# Patient Record
Sex: MALE | Race: WHITE | NOT HISPANIC OR LATINO | Employment: FULL TIME | URBAN - METROPOLITAN AREA
[De-identification: names, ages, dates, MRNs, and addresses within clinical notes are randomized per-mention and may not be internally consistent; named-entity substitution may affect disease eponyms.]

---

## 2023-12-08 ENCOUNTER — ANESTHESIA (OUTPATIENT)
Dept: SURGERY | Facility: MEDICAL CENTER | Age: 48
DRG: 329 | End: 2023-12-08
Payer: COMMERCIAL

## 2023-12-08 ENCOUNTER — APPOINTMENT (OUTPATIENT)
Dept: RADIOLOGY | Facility: MEDICAL CENTER | Age: 48
DRG: 329 | End: 2023-12-08
Attending: EMERGENCY MEDICINE
Payer: COMMERCIAL

## 2023-12-08 ENCOUNTER — HOSPITAL ENCOUNTER (INPATIENT)
Facility: MEDICAL CENTER | Age: 48
LOS: 7 days | DRG: 329 | End: 2023-12-15
Attending: EMERGENCY MEDICINE | Admitting: SURGERY
Payer: COMMERCIAL

## 2023-12-08 ENCOUNTER — ANESTHESIA EVENT (OUTPATIENT)
Dept: SURGERY | Facility: MEDICAL CENTER | Age: 48
DRG: 329 | End: 2023-12-08
Payer: COMMERCIAL

## 2023-12-08 DIAGNOSIS — K63.1 PERFORATION OF SIGMOID COLON (HCC): ICD-10-CM

## 2023-12-08 DIAGNOSIS — K63.1 PERFORATION BOWEL (HCC): ICD-10-CM

## 2023-12-08 DIAGNOSIS — Z71.89 OSTOMY NURSE CONSULTATION: ICD-10-CM

## 2023-12-08 PROBLEM — R19.8 PERFORATED VISCUS: Status: ACTIVE | Noted: 2023-12-08

## 2023-12-08 PROBLEM — E03.9 HYPOTHYROID: Status: ACTIVE | Noted: 2023-12-08

## 2023-12-08 PROBLEM — K66.8 PNEUMOPERITONEUM: Status: RESOLVED | Noted: 2023-12-08 | Resolved: 2023-12-08

## 2023-12-08 PROBLEM — K66.8 PNEUMOPERITONEUM: Status: ACTIVE | Noted: 2023-12-08

## 2023-12-08 LAB
ALBUMIN SERPL BCP-MCNC: 4.5 G/DL (ref 3.2–4.9)
ALBUMIN/GLOB SERPL: 1.5 G/DL
ALP SERPL-CCNC: 107 U/L (ref 30–99)
ALT SERPL-CCNC: 46 U/L (ref 2–50)
ANION GAP SERPL CALC-SCNC: 16 MMOL/L (ref 7–16)
APPEARANCE UR: CLEAR
AST SERPL-CCNC: 48 U/L (ref 12–45)
BACTERIA #/AREA URNS HPF: NEGATIVE /HPF
BASOPHILS # BLD AUTO: 0.2 % (ref 0–1.8)
BASOPHILS # BLD: 0.02 K/UL (ref 0–0.12)
BILIRUB SERPL-MCNC: 1.1 MG/DL (ref 0.1–1.5)
BILIRUB UR QL STRIP.AUTO: ABNORMAL
BUN SERPL-MCNC: 20 MG/DL (ref 8–22)
CALCIUM ALBUM COR SERPL-MCNC: 9.2 MG/DL (ref 8.5–10.5)
CALCIUM SERPL-MCNC: 9.6 MG/DL (ref 8.5–10.5)
CHLORIDE SERPL-SCNC: 101 MMOL/L (ref 96–112)
CO2 SERPL-SCNC: 25 MMOL/L (ref 20–33)
COLOR UR: ABNORMAL
CREAT SERPL-MCNC: 1.36 MG/DL (ref 0.5–1.4)
EOSINOPHIL # BLD AUTO: 0 K/UL (ref 0–0.51)
EOSINOPHIL NFR BLD: 0 % (ref 0–6.9)
EPI CELLS #/AREA URNS HPF: NEGATIVE /HPF
ERYTHROCYTE [DISTWIDTH] IN BLOOD BY AUTOMATED COUNT: 40.4 FL (ref 35.9–50)
GFR SERPLBLD CREATININE-BSD FMLA CKD-EPI: 65 ML/MIN/1.73 M 2
GLOBULIN SER CALC-MCNC: 3 G/DL (ref 1.9–3.5)
GLUCOSE SERPL-MCNC: 143 MG/DL (ref 65–99)
GLUCOSE UR STRIP.AUTO-MCNC: NEGATIVE MG/DL
HCT VFR BLD AUTO: 49.8 % (ref 42–52)
HGB BLD-MCNC: 17.6 G/DL (ref 14–18)
HYALINE CASTS #/AREA URNS LPF: ABNORMAL /LPF
IMM GRANULOCYTES # BLD AUTO: 0.02 K/UL (ref 0–0.11)
IMM GRANULOCYTES NFR BLD AUTO: 0.2 % (ref 0–0.9)
KETONES UR STRIP.AUTO-MCNC: 15 MG/DL
LEUKOCYTE ESTERASE UR QL STRIP.AUTO: ABNORMAL
LIPASE SERPL-CCNC: 19 U/L (ref 11–82)
LYMPHOCYTES # BLD AUTO: 0.49 K/UL (ref 1–4.8)
LYMPHOCYTES NFR BLD: 4 % (ref 22–41)
MCH RBC QN AUTO: 30.3 PG (ref 27–33)
MCHC RBC AUTO-ENTMCNC: 35.3 G/DL (ref 32.3–36.5)
MCV RBC AUTO: 85.9 FL (ref 81.4–97.8)
MICRO URNS: ABNORMAL
MONOCYTES # BLD AUTO: 0.68 K/UL (ref 0–0.85)
MONOCYTES NFR BLD AUTO: 5.5 % (ref 0–13.4)
NEUTROPHILS # BLD AUTO: 11.18 K/UL (ref 1.82–7.42)
NEUTROPHILS NFR BLD: 90.1 % (ref 44–72)
NITRITE UR QL STRIP.AUTO: NEGATIVE
NRBC # BLD AUTO: 0 K/UL
NRBC BLD-RTO: 0 /100 WBC (ref 0–0.2)
PH UR STRIP.AUTO: 5 [PH] (ref 5–8)
PLATELET # BLD AUTO: 317 K/UL (ref 164–446)
PMV BLD AUTO: 9.9 FL (ref 9–12.9)
POTASSIUM SERPL-SCNC: 3.8 MMOL/L (ref 3.6–5.5)
PROT SERPL-MCNC: 7.5 G/DL (ref 6–8.2)
PROT UR QL STRIP: NEGATIVE MG/DL
RBC # BLD AUTO: 5.8 M/UL (ref 4.7–6.1)
RBC # URNS HPF: ABNORMAL /HPF
RBC UR QL AUTO: NEGATIVE
SODIUM SERPL-SCNC: 142 MMOL/L (ref 135–145)
SP GR UR STRIP.AUTO: 1.03
UROBILINOGEN UR STRIP.AUTO-MCNC: 1 MG/DL
WBC # BLD AUTO: 12.4 K/UL (ref 4.8–10.8)
WBC #/AREA URNS HPF: ABNORMAL /HPF

## 2023-12-08 PROCEDURE — 110371 HCHG SHELL REV 272: Performed by: SURGERY

## 2023-12-08 PROCEDURE — 0DJD4ZZ INSPECTION OF LOWER INTESTINAL TRACT, PERCUTANEOUS ENDOSCOPIC APPROACH: ICD-10-PCS | Performed by: SURGERY

## 2023-12-08 PROCEDURE — 87077 CULTURE AEROBIC IDENTIFY: CPT

## 2023-12-08 PROCEDURE — 700111 HCHG RX REV CODE 636 W/ 250 OVERRIDE (IP): Performed by: ANESTHESIOLOGY

## 2023-12-08 PROCEDURE — 87070 CULTURE OTHR SPECIMN AEROBIC: CPT

## 2023-12-08 PROCEDURE — 99291 CRITICAL CARE FIRST HOUR: CPT

## 2023-12-08 PROCEDURE — 160029 HCHG SURGERY MINUTES - 1ST 30 MINS LEVEL 4: Performed by: SURGERY

## 2023-12-08 PROCEDURE — 700117 HCHG RX CONTRAST REV CODE 255: Performed by: EMERGENCY MEDICINE

## 2023-12-08 PROCEDURE — 44141 PARTIAL REMOVAL OF COLON: CPT | Performed by: SURGERY

## 2023-12-08 PROCEDURE — 0D1M0Z4 BYPASS DESCENDING COLON TO CUTANEOUS, OPEN APPROACH: ICD-10-PCS | Performed by: SURGERY

## 2023-12-08 PROCEDURE — 87205 SMEAR GRAM STAIN: CPT

## 2023-12-08 PROCEDURE — 88307 TISSUE EXAM BY PATHOLOGIST: CPT

## 2023-12-08 PROCEDURE — 700111 HCHG RX REV CODE 636 W/ 250 OVERRIDE (IP): Mod: JZ | Performed by: EMERGENCY MEDICINE

## 2023-12-08 PROCEDURE — 80053 COMPREHEN METABOLIC PANEL: CPT

## 2023-12-08 PROCEDURE — 36415 COLL VENOUS BLD VENIPUNCTURE: CPT

## 2023-12-08 PROCEDURE — 0DTN0ZZ RESECTION OF SIGMOID COLON, OPEN APPROACH: ICD-10-PCS | Performed by: SURGERY

## 2023-12-08 PROCEDURE — 85025 COMPLETE CBC W/AUTO DIFF WBC: CPT

## 2023-12-08 PROCEDURE — C1765 ADHESION BARRIER: HCPCS | Performed by: SURGERY

## 2023-12-08 PROCEDURE — 81001 URINALYSIS AUTO W/SCOPE: CPT

## 2023-12-08 PROCEDURE — 160035 HCHG PACU - 1ST 60 MINS PHASE I: Performed by: SURGERY

## 2023-12-08 PROCEDURE — 97606 NEG PRS WND THER DME>50 SQCM: CPT | Performed by: SURGERY

## 2023-12-08 PROCEDURE — 94760 N-INVAS EAR/PLS OXIMETRY 1: CPT

## 2023-12-08 PROCEDURE — 160002 HCHG RECOVERY MINUTES (STAT): Performed by: SURGERY

## 2023-12-08 PROCEDURE — 160009 HCHG ANES TIME/MIN: Performed by: SURGERY

## 2023-12-08 PROCEDURE — 700101 HCHG RX REV CODE 250: Performed by: ANESTHESIOLOGY

## 2023-12-08 PROCEDURE — 99223 1ST HOSP IP/OBS HIGH 75: CPT | Mod: AI,57 | Performed by: SURGERY

## 2023-12-08 PROCEDURE — 74177 CT ABD & PELVIS W/CONTRAST: CPT

## 2023-12-08 PROCEDURE — 700101 HCHG RX REV CODE 250: Performed by: SURGERY

## 2023-12-08 PROCEDURE — 160036 HCHG PACU - EA ADDL 30 MINS PHASE I: Performed by: SURGERY

## 2023-12-08 PROCEDURE — 96375 TX/PRO/DX INJ NEW DRUG ADDON: CPT

## 2023-12-08 PROCEDURE — 87076 CULTURE ANAEROBE IDENT EACH: CPT

## 2023-12-08 PROCEDURE — 87075 CULTR BACTERIA EXCEPT BLOOD: CPT

## 2023-12-08 PROCEDURE — 87186 SC STD MICRODIL/AGAR DIL: CPT

## 2023-12-08 PROCEDURE — 770001 HCHG ROOM/CARE - MED/SURG/GYN PRIV*

## 2023-12-08 PROCEDURE — 96374 THER/PROPH/DIAG INJ IV PUSH: CPT

## 2023-12-08 PROCEDURE — 700105 HCHG RX REV CODE 258: Performed by: ANESTHESIOLOGY

## 2023-12-08 PROCEDURE — 160041 HCHG SURGERY MINUTES - EA ADDL 1 MIN LEVEL 4: Performed by: SURGERY

## 2023-12-08 PROCEDURE — 160048 HCHG OR STATISTICAL LEVEL 1-5: Performed by: SURGERY

## 2023-12-08 PROCEDURE — 83690 ASSAY OF LIPASE: CPT

## 2023-12-08 RX ORDER — ONDANSETRON 4 MG/1
4 TABLET, ORALLY DISINTEGRATING ORAL EVERY 4 HOURS PRN
Status: DISCONTINUED | OUTPATIENT
Start: 2023-12-08 | End: 2023-12-15 | Stop reason: HOSPADM

## 2023-12-08 RX ORDER — HYDROMORPHONE HYDROCHLORIDE 1 MG/ML
0.5 INJECTION, SOLUTION INTRAMUSCULAR; INTRAVENOUS; SUBCUTANEOUS
Status: DISCONTINUED | OUTPATIENT
Start: 2023-12-08 | End: 2023-12-08

## 2023-12-08 RX ORDER — ONDANSETRON 2 MG/ML
4 INJECTION INTRAMUSCULAR; INTRAVENOUS EVERY 4 HOURS PRN
Status: DISCONTINUED | OUTPATIENT
Start: 2023-12-08 | End: 2023-12-15 | Stop reason: HOSPADM

## 2023-12-08 RX ORDER — METRONIDAZOLE 500 MG/100ML
500 INJECTION, SOLUTION INTRAVENOUS ONCE
Status: COMPLETED | OUTPATIENT
Start: 2023-12-08 | End: 2023-12-08

## 2023-12-08 RX ORDER — HYDRALAZINE HYDROCHLORIDE 20 MG/ML
5 INJECTION INTRAMUSCULAR; INTRAVENOUS
Status: DISCONTINUED | OUTPATIENT
Start: 2023-12-08 | End: 2023-12-08 | Stop reason: HOSPADM

## 2023-12-08 RX ORDER — CEFTRIAXONE 2 G/1
2000 INJECTION, POWDER, FOR SOLUTION INTRAMUSCULAR; INTRAVENOUS ONCE
Status: COMPLETED | OUTPATIENT
Start: 2023-12-08 | End: 2023-12-08

## 2023-12-08 RX ORDER — HALOPERIDOL 5 MG/ML
1 INJECTION INTRAMUSCULAR
Status: DISCONTINUED | OUTPATIENT
Start: 2023-12-08 | End: 2023-12-08 | Stop reason: HOSPADM

## 2023-12-08 RX ORDER — LEVOTHYROXINE SODIUM 0.15 MG/1
150 TABLET ORAL
COMMUNITY

## 2023-12-08 RX ORDER — BUPIVACAINE HYDROCHLORIDE AND EPINEPHRINE 5; 5 MG/ML; UG/ML
INJECTION, SOLUTION EPIDURAL; INTRACAUDAL; PERINEURAL
Status: DISCONTINUED | OUTPATIENT
Start: 2023-12-08 | End: 2023-12-08 | Stop reason: HOSPADM

## 2023-12-08 RX ORDER — ACETAMINOPHEN 500 MG
1000 TABLET ORAL EVERY 6 HOURS PRN
Status: DISCONTINUED | OUTPATIENT
Start: 2023-12-14 | End: 2023-12-15 | Stop reason: HOSPADM

## 2023-12-08 RX ORDER — OXYCODONE HCL 5 MG/5 ML
5 SOLUTION, ORAL ORAL
Status: DISCONTINUED | OUTPATIENT
Start: 2023-12-08 | End: 2023-12-08 | Stop reason: HOSPADM

## 2023-12-08 RX ORDER — ONDANSETRON 2 MG/ML
INJECTION INTRAMUSCULAR; INTRAVENOUS PRN
Status: DISCONTINUED | OUTPATIENT
Start: 2023-12-08 | End: 2023-12-08 | Stop reason: SURG

## 2023-12-08 RX ORDER — HYDRALAZINE HYDROCHLORIDE 20 MG/ML
10 INJECTION INTRAMUSCULAR; INTRAVENOUS EVERY 4 HOURS PRN
Status: DISCONTINUED | OUTPATIENT
Start: 2023-12-08 | End: 2023-12-15 | Stop reason: HOSPADM

## 2023-12-08 RX ORDER — OXYCODONE HYDROCHLORIDE 10 MG/1
10 TABLET ORAL
Status: DISCONTINUED | OUTPATIENT
Start: 2023-12-08 | End: 2023-12-15 | Stop reason: HOSPADM

## 2023-12-08 RX ORDER — OXYCODONE HCL 5 MG/5 ML
10 SOLUTION, ORAL ORAL
Status: DISCONTINUED | OUTPATIENT
Start: 2023-12-08 | End: 2023-12-08 | Stop reason: HOSPADM

## 2023-12-08 RX ORDER — HYDROMORPHONE HYDROCHLORIDE 1 MG/ML
0.4 INJECTION, SOLUTION INTRAMUSCULAR; INTRAVENOUS; SUBCUTANEOUS
Status: DISCONTINUED | OUTPATIENT
Start: 2023-12-08 | End: 2023-12-08 | Stop reason: HOSPADM

## 2023-12-08 RX ORDER — ENOXAPARIN SODIUM 100 MG/ML
40 INJECTION SUBCUTANEOUS DAILY
Status: DISCONTINUED | OUTPATIENT
Start: 2023-12-09 | End: 2023-12-15 | Stop reason: HOSPADM

## 2023-12-08 RX ORDER — FAMOTIDINE 20 MG/1
20 TABLET, FILM COATED ORAL 2 TIMES DAILY
Status: DISCONTINUED | OUTPATIENT
Start: 2023-12-08 | End: 2023-12-12

## 2023-12-08 RX ORDER — EPHEDRINE SULFATE 50 MG/ML
5 INJECTION, SOLUTION INTRAVENOUS
Status: DISCONTINUED | OUTPATIENT
Start: 2023-12-08 | End: 2023-12-08 | Stop reason: HOSPADM

## 2023-12-08 RX ORDER — HYDROMORPHONE HYDROCHLORIDE 1 MG/ML
0.2 INJECTION, SOLUTION INTRAMUSCULAR; INTRAVENOUS; SUBCUTANEOUS
Status: DISCONTINUED | OUTPATIENT
Start: 2023-12-08 | End: 2023-12-08 | Stop reason: HOSPADM

## 2023-12-08 RX ORDER — HYDROMORPHONE HYDROCHLORIDE 1 MG/ML
0.1 INJECTION, SOLUTION INTRAMUSCULAR; INTRAVENOUS; SUBCUTANEOUS
Status: DISCONTINUED | OUTPATIENT
Start: 2023-12-08 | End: 2023-12-08 | Stop reason: HOSPADM

## 2023-12-08 RX ORDER — SODIUM CHLORIDE, SODIUM LACTATE, POTASSIUM CHLORIDE, CALCIUM CHLORIDE 600; 310; 30; 20 MG/100ML; MG/100ML; MG/100ML; MG/100ML
INJECTION, SOLUTION INTRAVENOUS CONTINUOUS
Status: DISCONTINUED | OUTPATIENT
Start: 2023-12-08 | End: 2023-12-11

## 2023-12-08 RX ORDER — ACETAMINOPHEN 500 MG
1000 TABLET ORAL EVERY 6 HOURS
Status: DISPENSED | OUTPATIENT
Start: 2023-12-09 | End: 2023-12-13

## 2023-12-08 RX ORDER — SODIUM CHLORIDE, SODIUM LACTATE, POTASSIUM CHLORIDE, CALCIUM CHLORIDE 600; 310; 30; 20 MG/100ML; MG/100ML; MG/100ML; MG/100ML
INJECTION, SOLUTION INTRAVENOUS CONTINUOUS
Status: DISCONTINUED | OUTPATIENT
Start: 2023-12-08 | End: 2023-12-08 | Stop reason: HOSPADM

## 2023-12-08 RX ORDER — HYDROMORPHONE HYDROCHLORIDE 1 MG/ML
0.5 INJECTION, SOLUTION INTRAMUSCULAR; INTRAVENOUS; SUBCUTANEOUS
Status: DISCONTINUED | OUTPATIENT
Start: 2023-12-08 | End: 2023-12-15 | Stop reason: HOSPADM

## 2023-12-08 RX ORDER — DEXAMETHASONE SODIUM PHOSPHATE 4 MG/ML
INJECTION, SOLUTION INTRA-ARTICULAR; INTRALESIONAL; INTRAMUSCULAR; INTRAVENOUS; SOFT TISSUE PRN
Status: DISCONTINUED | OUTPATIENT
Start: 2023-12-08 | End: 2023-12-08 | Stop reason: SURG

## 2023-12-08 RX ORDER — SODIUM CHLORIDE, SODIUM LACTATE, POTASSIUM CHLORIDE, CALCIUM CHLORIDE 600; 310; 30; 20 MG/100ML; MG/100ML; MG/100ML; MG/100ML
INJECTION, SOLUTION INTRAVENOUS
Status: DISCONTINUED | OUTPATIENT
Start: 2023-12-08 | End: 2023-12-08 | Stop reason: SURG

## 2023-12-08 RX ORDER — LEVOTHYROXINE SODIUM 0.15 MG/1
150 TABLET ORAL
Status: DISCONTINUED | OUTPATIENT
Start: 2023-12-08 | End: 2023-12-15 | Stop reason: HOSPADM

## 2023-12-08 RX ORDER — ONDANSETRON 2 MG/ML
4 INJECTION INTRAMUSCULAR; INTRAVENOUS ONCE
Status: COMPLETED | OUTPATIENT
Start: 2023-12-08 | End: 2023-12-08

## 2023-12-08 RX ORDER — DOCUSATE SODIUM 100 MG/1
100 CAPSULE, LIQUID FILLED ORAL 2 TIMES DAILY
Status: DISCONTINUED | OUTPATIENT
Start: 2023-12-08 | End: 2023-12-15 | Stop reason: HOSPADM

## 2023-12-08 RX ORDER — CELECOXIB 200 MG/1
200 CAPSULE ORAL 2 TIMES DAILY
Status: COMPLETED | OUTPATIENT
Start: 2023-12-09 | End: 2023-12-13

## 2023-12-08 RX ORDER — CELECOXIB 200 MG/1
200 CAPSULE ORAL 2 TIMES DAILY PRN
Status: DISCONTINUED | OUTPATIENT
Start: 2023-12-14 | End: 2023-12-15 | Stop reason: HOSPADM

## 2023-12-08 RX ORDER — DIPHENHYDRAMINE HYDROCHLORIDE 50 MG/ML
12.5 INJECTION INTRAMUSCULAR; INTRAVENOUS
Status: DISCONTINUED | OUTPATIENT
Start: 2023-12-08 | End: 2023-12-08 | Stop reason: HOSPADM

## 2023-12-08 RX ORDER — MIDAZOLAM HYDROCHLORIDE 1 MG/ML
INJECTION INTRAMUSCULAR; INTRAVENOUS PRN
Status: DISCONTINUED | OUTPATIENT
Start: 2023-12-08 | End: 2023-12-08 | Stop reason: SURG

## 2023-12-08 RX ORDER — OXYCODONE HYDROCHLORIDE 5 MG/1
5 TABLET ORAL
Status: DISCONTINUED | OUTPATIENT
Start: 2023-12-08 | End: 2023-12-15 | Stop reason: HOSPADM

## 2023-12-08 RX ORDER — METOPROLOL TARTRATE 1 MG/ML
1 INJECTION, SOLUTION INTRAVENOUS
Status: DISCONTINUED | OUTPATIENT
Start: 2023-12-08 | End: 2023-12-08 | Stop reason: HOSPADM

## 2023-12-08 RX ORDER — ONDANSETRON 2 MG/ML
4 INJECTION INTRAMUSCULAR; INTRAVENOUS
Status: DISCONTINUED | OUTPATIENT
Start: 2023-12-08 | End: 2023-12-08 | Stop reason: HOSPADM

## 2023-12-08 RX ORDER — MEPERIDINE HYDROCHLORIDE 25 MG/ML
6.25 INJECTION INTRAMUSCULAR; INTRAVENOUS; SUBCUTANEOUS
Status: DISCONTINUED | OUTPATIENT
Start: 2023-12-08 | End: 2023-12-08 | Stop reason: HOSPADM

## 2023-12-08 RX ADMIN — FENTANYL CITRATE 50 MCG: 50 INJECTION, SOLUTION INTRAMUSCULAR; INTRAVENOUS at 19:14

## 2023-12-08 RX ADMIN — Medication 25 MG: at 20:01

## 2023-12-08 RX ADMIN — FENTANYL CITRATE 50 MCG: 50 INJECTION, SOLUTION INTRAMUSCULAR; INTRAVENOUS at 19:38

## 2023-12-08 RX ADMIN — HYDROMORPHONE HYDROCHLORIDE 0.4 MG: 1 INJECTION, SOLUTION INTRAMUSCULAR; INTRAVENOUS; SUBCUTANEOUS at 21:59

## 2023-12-08 RX ADMIN — PROPOFOL 150 MG: 10 INJECTION, EMULSION INTRAVENOUS at 18:38

## 2023-12-08 RX ADMIN — Medication 25 MG: at 20:27

## 2023-12-08 RX ADMIN — MIDAZOLAM HYDROCHLORIDE 2 MG: 1 INJECTION, SOLUTION INTRAMUSCULAR; INTRAVENOUS at 18:36

## 2023-12-08 RX ADMIN — FENTANYL CITRATE 100 MCG: 50 INJECTION, SOLUTION INTRAMUSCULAR; INTRAVENOUS at 18:38

## 2023-12-08 RX ADMIN — IOHEXOL 100 ML: 350 INJECTION, SOLUTION INTRAVENOUS at 17:00

## 2023-12-08 RX ADMIN — DEXAMETHASONE SODIUM PHOSPHATE 8 MG: 4 INJECTION INTRA-ARTICULAR; INTRALESIONAL; INTRAMUSCULAR; INTRAVENOUS; SOFT TISSUE at 18:39

## 2023-12-08 RX ADMIN — SODIUM CHLORIDE, POTASSIUM CHLORIDE, SODIUM LACTATE AND CALCIUM CHLORIDE: 600; 310; 30; 20 INJECTION, SOLUTION INTRAVENOUS at 18:31

## 2023-12-08 RX ADMIN — SODIUM CHLORIDE, POTASSIUM CHLORIDE, SODIUM LACTATE AND CALCIUM CHLORIDE: 600; 310; 30; 20 INJECTION, SOLUTION INTRAVENOUS at 20:59

## 2023-12-08 RX ADMIN — ROCURONIUM BROMIDE 10 MG: 10 INJECTION, SOLUTION INTRAVENOUS at 19:16

## 2023-12-08 RX ADMIN — SUGAMMADEX 180 MG: 100 INJECTION, SOLUTION INTRAVENOUS at 21:04

## 2023-12-08 RX ADMIN — PROPOFOL 10 MG: 10 INJECTION, EMULSION INTRAVENOUS at 20:46

## 2023-12-08 RX ADMIN — METRONIDAZOLE 500 MG: 500 INJECTION, SOLUTION INTRAVENOUS at 17:53

## 2023-12-08 RX ADMIN — FENTANYL CITRATE 25 MCG: 50 INJECTION, SOLUTION INTRAMUSCULAR; INTRAVENOUS at 21:40

## 2023-12-08 RX ADMIN — FENTANYL CITRATE 50 MCG: 50 INJECTION, SOLUTION INTRAMUSCULAR; INTRAVENOUS at 20:27

## 2023-12-08 RX ADMIN — CEFTRIAXONE SODIUM 2000 MG: 2 INJECTION, POWDER, FOR SOLUTION INTRAMUSCULAR; INTRAVENOUS at 17:47

## 2023-12-08 RX ADMIN — FENTANYL CITRATE 50 MCG: 50 INJECTION, SOLUTION INTRAMUSCULAR; INTRAVENOUS at 19:23

## 2023-12-08 RX ADMIN — ONDANSETRON 4 MG: 2 INJECTION INTRAMUSCULAR; INTRAVENOUS at 15:30

## 2023-12-08 RX ADMIN — SODIUM CHLORIDE, POTASSIUM CHLORIDE, SODIUM LACTATE AND CALCIUM CHLORIDE: 600; 310; 30; 20 INJECTION, SOLUTION INTRAVENOUS at 19:33

## 2023-12-08 RX ADMIN — FENTANYL CITRATE 50 MCG: 50 INJECTION, SOLUTION INTRAMUSCULAR; INTRAVENOUS at 20:52

## 2023-12-08 RX ADMIN — HYDROMORPHONE HYDROCHLORIDE 0.5 MG: 1 INJECTION, SOLUTION INTRAMUSCULAR; INTRAVENOUS; SUBCUTANEOUS at 15:31

## 2023-12-08 RX ADMIN — FENTANYL CITRATE 25 MCG: 50 INJECTION, SOLUTION INTRAMUSCULAR; INTRAVENOUS at 21:46

## 2023-12-08 RX ADMIN — FENTANYL CITRATE 50 MCG: 50 INJECTION, SOLUTION INTRAMUSCULAR; INTRAVENOUS at 21:28

## 2023-12-08 RX ADMIN — ROCURONIUM BROMIDE 80 MG: 10 INJECTION, SOLUTION INTRAVENOUS at 18:38

## 2023-12-08 RX ADMIN — ROCURONIUM BROMIDE 10 MG: 10 INJECTION, SOLUTION INTRAVENOUS at 19:47

## 2023-12-08 RX ADMIN — ONDANSETRON 4 MG: 2 INJECTION INTRAMUSCULAR; INTRAVENOUS at 18:39

## 2023-12-08 ASSESSMENT — PAIN DESCRIPTION - PAIN TYPE
TYPE: ACUTE PAIN

## 2023-12-08 NOTE — ED PROVIDER NOTES
"ER Provider Note    Scribed for Mario Galvez M.D. by Jah Hawthorne. 12/8/2023   3:04 PM    Primary Care Provider: Karan Blair D.O.    CHIEF COMPLAINT  Chief Complaint   Patient presents with    Abdominal Pain     9/10 generalized abdominal pain onset this am at home. No relief with 100 mcg Fentanyl with EMS PTA. Denies abdominal history. No BM x 1.5 days, urination decreased from normal amount     EXTERNAL RECORDS REVIEWED  Outpatient Notes History of CKD    HPI/ROS    LIMITATION TO HISTORY   Select: : None    Stuart Nagy is a 48 y.o. male who presents to the ED for evaluation of abdominal pain onset this morning. He states that an hour after he woke up he started to have abdominal pain, localized to the lower abdomen. He then had an episode of vomiting which increased his pain from a \"5 to a 10\". The pain is now generalized. He denies any fevers, dysuria or hematuria. He denies any changes in bowel movements, denies any blood or melena. He denies any history of similar symptoms. He denies any abdominal surgeries. He denies major alcohol use, or recurrent NSAID use.     PAST MEDICAL HISTORY  Past Medical History:   Diagnosis Date    Cancer (HCC)        SURGICAL HISTORY  Past Surgical History:   Procedure Laterality Date    TONSILLECTOMY  2/6/2015    Performed by Fernando Fair M.D. at SURGERY SAME DAY [facility]S    LARYNGOSCOPY  2/6/2015    Performed by Fernando Fair M.D. at SURGERY SAME DAY [facility]    LYMPH NODE EXCISION  1/30/2015    Performed by Fernando Fair M.D. at SURGERY SAME DAY [facility]       FAMILY HISTORY  History reviewed. No pertinent family history.    SOCIAL HISTORY   reports that he quit smoking about 15 years ago. His smoking use included cigarettes. He started smoking about 30 years ago. He has a 15.0 pack-year smoking history. He has never used smokeless tobacco. He reports that he does not drink alcohol and does not use drugs.    CURRENT MEDICATIONS  Current " Discharge Medication List        CONTINUE these medications which have NOT CHANGED    Details   levothyroxine (SYNTHROID) 150 MCG Tab Take 150 mcg by mouth at bedtime.             ALLERGIES  No Known Allergies     PHYSICAL EXAM  /89   Pulse 92   Temp 36.4 °C (97.6 °F) (Temporal)   Resp 18   Ht 1.829 m (6')   Wt 99.8 kg (220 lb)   SpO2 94%   BMI 29.84 kg/m²    Constitutional: Well developed, Well nourished, Moderate to severe distress,    HENT: Normocephalic, Atraumatic, dry mucous membranes  Eyes: PERRLA, EOMI, Conjunctiva normal, No discharge.   Neck: No tenderness, Supple, No stridor.   Cardiovascular: Normal heart rate, Normal rhythm.   Thorax & Lungs: Clear to auscultation bilaterally, No respiratory distress, No wheezing, No crackles.   Abdomen: Soft, Diffuse tenderness to palpation throughout, No masses, No pulsatile masses.   Skin: Warm, Dry, No erythema, No rash.    Musculoskeletal: No major deformities noted.  Intact distal pulses  Neurologic: Awake, alert. Moves all extremities spontaneously.  Psychiatric: Affect normal, Judgment normal, Mood normal.      DIAGNOSTIC STUDIES    Labs:   Results for orders placed or performed during the hospital encounter of 12/08/23   CBC WITH DIFFERENTIAL   Result Value Ref Range    WBC 12.4 (H) 4.8 - 10.8 K/uL    RBC 5.80 4.70 - 6.10 M/uL    Hemoglobin 17.6 14.0 - 18.0 g/dL    Hematocrit 49.8 42.0 - 52.0 %    MCV 85.9 81.4 - 97.8 fL    MCH 30.3 27.0 - 33.0 pg    MCHC 35.3 32.3 - 36.5 g/dL    RDW 40.4 35.9 - 50.0 fL    Platelet Count 317 164 - 446 K/uL    MPV 9.9 9.0 - 12.9 fL    Neutrophils-Polys 90.10 (H) 44.00 - 72.00 %    Lymphocytes 4.00 (L) 22.00 - 41.00 %    Monocytes 5.50 0.00 - 13.40 %    Eosinophils 0.00 0.00 - 6.90 %    Basophils 0.20 0.00 - 1.80 %    Immature Granulocytes 0.20 0.00 - 0.90 %    Nucleated RBC 0.00 0.00 - 0.20 /100 WBC    Neutrophils (Absolute) 11.18 (H) 1.82 - 7.42 K/uL    Lymphs (Absolute) 0.49 (L) 1.00 - 4.80 K/uL    Monos  (Absolute) 0.68 0.00 - 0.85 K/uL    Eos (Absolute) 0.00 0.00 - 0.51 K/uL    Baso (Absolute) 0.02 0.00 - 0.12 K/uL    Immature Granulocytes (abs) 0.02 0.00 - 0.11 K/uL    NRBC (Absolute) 0.00 K/uL   COMP METABOLIC PANEL   Result Value Ref Range    Sodium 142 135 - 145 mmol/L    Potassium 3.8 3.6 - 5.5 mmol/L    Chloride 101 96 - 112 mmol/L    Co2 25 20 - 33 mmol/L    Anion Gap 16.0 7.0 - 16.0    Glucose 143 (H) 65 - 99 mg/dL    Bun 20 8 - 22 mg/dL    Creatinine 1.36 0.50 - 1.40 mg/dL    Calcium 9.6 8.5 - 10.5 mg/dL    Correct Calcium 9.2 8.5 - 10.5 mg/dL    AST(SGOT) 48 (H) 12 - 45 U/L    ALT(SGPT) 46 2 - 50 U/L    Alkaline Phosphatase 107 (H) 30 - 99 U/L    Total Bilirubin 1.1 0.1 - 1.5 mg/dL    Albumin 4.5 3.2 - 4.9 g/dL    Total Protein 7.5 6.0 - 8.2 g/dL    Globulin 3.0 1.9 - 3.5 g/dL    A-G Ratio 1.5 g/dL   LIPASE   Result Value Ref Range    Lipase 19 11 - 82 U/L   URINALYSIS    Specimen: Urine   Result Value Ref Range    Color DK Yellow     Character Clear     Specific Gravity 1.029 <1.035    Ph 5.0 5.0 - 8.0    Glucose Negative Negative mg/dL    Ketones 15 (A) Negative mg/dL    Protein Negative Negative mg/dL    Bilirubin Small (A) Negative    Urobilinogen, Urine 1.0 Negative    Nitrite Negative Negative    Leukocyte Esterase Trace (A) Negative    Occult Blood Negative Negative    Micro Urine Req Microscopic    ESTIMATED GFR   Result Value Ref Range    GFR (CKD-EPI) 65 >60 mL/min/1.73 m 2   URINE MICROSCOPIC (W/UA)   Result Value Ref Range    WBC 0-2 (A) /hpf    RBC 0-2 (A) /hpf    Bacteria Negative None /hpf    Epithelial Cells Negative /hpf    Hyaline Cast 3-5 (A) /lpf     All labs reviewed by me.     Radiology:   This attending emergency physician has independently interpreted the diagnostic imaging associated with this visit and is awaiting the final reading from the radiologist.   Preliminary interpretation is a follows: Free intraperitoneal air  Radiologist interpretation:   CT-ABDOMEN-PELVIS WITH    Final Result         1. Moderate free intraperitoneal air, concerning for perforated viscus. There is free fluid surrounding the liver and in bilateral lower quadrant as well. The exact location of the perforation is unknown.      2. Significantly dilated rectum with large amount of stool and decompress sigmoid colon. This could relate to stercoral colitis and could be a potential area for perforation.      3. A 3.7 cm right adrenal nodule with peripheral calcified wall, nonspecific but could be a pseudocyst or chronic hematoma.         CRITICAL RESULT READ BACK: Preliminary findings discussed with and critical read back performed by Dr. CELINA PENG in the Emergency Department via telephone on 12/8/2023 5:12 PM         COURSE & MEDICAL DECISION MAKING     ED Observation Status?  No    INITIAL ASSESSMENT, COURSE AND PLAN  Differential diagnoses include but not limited to: perforation, pancreatitis, infection     Care Narrative: Patient with peritonitis, the patient is acutely tender, diffusely tender throughout.  Got a CT scan and laboratory test, the patient was given IV antibiotics, CT scan shows acute likely sigmoid perforation.  Discussed case with surgery who came and evaluate the patient will take the patient to the operating room.    CRITICAL CARE  The very real possibilty of a deterioration of this patient's condition required the highest level of my preparedness for sudden, emergent intervention.  I provided critical care services, which included medication orders, frequent reevaluations of the patient's condition and response to treatment, ordering and reviewing test results, and discussing the case with various consultants.  The critical care time associated with the care of the patient was 35 minutes. Review chart for interventions. This time is exclusive of any other billable procedures.       3:04 PM - Patient was evaluated at bedside. He presents for abdominal pain, was originally lower and  manageable, then had an episode of vomiting, pain is now uncontrollable. Exam shows tenderness throughout. Ordered for CT-abdomen/pelvis w/, CBC w/ diff, CMP, lipase, and UA to evaluate. The patient will be medicated with Dilaudid 0.5 mg for his symptoms. Patient verbalizes understanding and support with my plan of care.     HYDRATION: Based on the patient's presentation of Dehydration the patient was given IV fluids. IV Hydration was used because oral hydration was not adequate alone. Upon recheck following hydration, the patient was improved.    5:26 PM I discussed the patient's case and the above findings with Dr. Caal (General Surgery) who will hospitalize the patient for surgery later today. Updated the patient about these findings, he is amenable to plan for surgery.    DISPOSITION AND DISCUSSIONS    I have discussed management of the patient with the following physicians and KIMBER's:  Dr. Caal (General Surgery)    Discussion of management with other Q or appropriate source(s): Radiologist called to inform that the patient has free air noted on CT      DISPOSITION:  Patient will be hospitalized by Dr. Caal in guarded condition.    FINAL DIAGNOSIS  1. Perforation bowel (HCC)      Jah WONG (Scribkusum), am scribing for, and in the presence of, Mario Galvez M.D..    Electronically signed by: Jah Hawthorne (Mariann), 12/8/2023    Mario WONG M.D. personally performed the services described in this documentation, as scribed by Jah Hawthorne in my presence, and it is both accurate and complete.      The note accurately reflects work and decisions made by me.  Mario Galvez M.D.  12/8/2023  9:26 PM

## 2023-12-08 NOTE — ED TRIAGE NOTES
Stuart OLEG Yakov  48 y.o. male  Chief Complaint   Patient presents with    Abdominal Pain     9/10 generalized abdominal pain onset this am at home. No relief with 100 mcg Fentanyl with EMS PTA. Denies abdominal history. No BM x 1.5 days, urination decreased from normal amount     Pt BIB EMS for above complaint.    Pt is GCS 15, speaking in full sentences, follows commands and responds appropriately to questions. Resp are even and unlabored.     Abdominal pain protocol ordered.     Vitals:    12/08/23 1458   BP: 133/89   Pulse: 92   Resp: 18   Temp: 36.4 °C (97.6 °F)   SpO2: 94%

## 2023-12-09 PROBLEM — R79.89 ELEVATED SERUM CREATININE: Status: ACTIVE | Noted: 2023-12-09

## 2023-12-09 LAB
ANION GAP SERPL CALC-SCNC: 13 MMOL/L (ref 7–16)
ANISOCYTOSIS BLD QL SMEAR: ABNORMAL
BASOPHILS # BLD AUTO: 0 % (ref 0–1.8)
BASOPHILS # BLD: 0 K/UL (ref 0–0.12)
BUN SERPL-MCNC: 21 MG/DL (ref 8–22)
BURR CELLS BLD QL SMEAR: NORMAL
CALCIUM SERPL-MCNC: 8.4 MG/DL (ref 8.5–10.5)
CHLORIDE SERPL-SCNC: 103 MMOL/L (ref 96–112)
CO2 SERPL-SCNC: 25 MMOL/L (ref 20–33)
CREAT SERPL-MCNC: 1.42 MG/DL (ref 0.5–1.4)
EOSINOPHIL # BLD AUTO: 0 K/UL (ref 0–0.51)
EOSINOPHIL NFR BLD: 0 % (ref 0–6.9)
ERYTHROCYTE [DISTWIDTH] IN BLOOD BY AUTOMATED COUNT: 41.2 FL (ref 35.9–50)
GFR SERPLBLD CREATININE-BSD FMLA CKD-EPI: 61 ML/MIN/1.73 M 2
GLUCOSE BLD STRIP.AUTO-MCNC: 154 MG/DL (ref 65–99)
GLUCOSE SERPL-MCNC: 137 MG/DL (ref 65–99)
GRAM STN SPEC: NORMAL
HCT VFR BLD AUTO: 45.9 % (ref 42–52)
HGB BLD-MCNC: 16 G/DL (ref 14–18)
LYMPHOCYTES # BLD AUTO: 0.54 K/UL (ref 1–4.8)
LYMPHOCYTES NFR BLD: 4.2 % (ref 22–41)
MANUAL DIFF BLD: ABNORMAL
MCH RBC QN AUTO: 30.1 PG (ref 27–33)
MCHC RBC AUTO-ENTMCNC: 34.9 G/DL (ref 32.3–36.5)
MCV RBC AUTO: 86.4 FL (ref 81.4–97.8)
METAMYELOCYTES NFR BLD MANUAL: 4.2 %
MICROCYTES BLD QL SMEAR: ABNORMAL
MONOCYTES # BLD AUTO: 0.1 K/UL (ref 0–0.85)
MONOCYTES NFR BLD AUTO: 0.8 % (ref 0–13.4)
MORPHOLOGY BLD-IMP: NORMAL
NEUTROPHILS # BLD AUTO: 11.71 K/UL (ref 1.82–7.42)
NEUTROPHILS NFR BLD: 76.5 % (ref 44–72)
NEUTS BAND NFR BLD MANUAL: 14.3 % (ref 0–10)
NRBC # BLD AUTO: 0 K/UL
NRBC BLD-RTO: 0 /100 WBC (ref 0–0.2)
OVALOCYTES BLD QL SMEAR: NORMAL
PLATELET # BLD AUTO: 274 K/UL (ref 164–446)
PLATELET BLD QL SMEAR: NORMAL
PMV BLD AUTO: 10.1 FL (ref 9–12.9)
POIKILOCYTOSIS BLD QL SMEAR: NORMAL
POTASSIUM SERPL-SCNC: 4.9 MMOL/L (ref 3.6–5.5)
RBC # BLD AUTO: 5.31 M/UL (ref 4.7–6.1)
RBC BLD AUTO: PRESENT
SIGNIFICANT IND 70042: NORMAL
SITE SITE: NORMAL
SODIUM SERPL-SCNC: 141 MMOL/L (ref 135–145)
SOURCE SOURCE: NORMAL
WBC # BLD AUTO: 12.9 K/UL (ref 4.8–10.8)

## 2023-12-09 PROCEDURE — A9270 NON-COVERED ITEM OR SERVICE: HCPCS

## 2023-12-09 PROCEDURE — 700105 HCHG RX REV CODE 258: Performed by: SURGERY

## 2023-12-09 PROCEDURE — 36415 COLL VENOUS BLD VENIPUNCTURE: CPT

## 2023-12-09 PROCEDURE — 302111 WAFER OST 2.25IN N IMG RD 2 PC (BARRIER): Performed by: SURGERY

## 2023-12-09 PROCEDURE — 80048 BASIC METABOLIC PNL TOTAL CA: CPT

## 2023-12-09 PROCEDURE — 302102 BAG OST N IMG 2.25IN 2PC (FECAL): Performed by: SURGERY

## 2023-12-09 PROCEDURE — 85007 BL SMEAR W/DIFF WBC COUNT: CPT

## 2023-12-09 PROCEDURE — 700105 HCHG RX REV CODE 258: Performed by: REGISTERED NURSE

## 2023-12-09 PROCEDURE — 700111 HCHG RX REV CODE 636 W/ 250 OVERRIDE (IP): Mod: JZ | Performed by: SURGERY

## 2023-12-09 PROCEDURE — 99024 POSTOP FOLLOW-UP VISIT: CPT | Performed by: REGISTERED NURSE

## 2023-12-09 PROCEDURE — 700111 HCHG RX REV CODE 636 W/ 250 OVERRIDE (IP): Mod: JZ

## 2023-12-09 PROCEDURE — 85027 COMPLETE CBC AUTOMATED: CPT

## 2023-12-09 PROCEDURE — 770001 HCHG ROOM/CARE - MED/SURG/GYN PRIV*

## 2023-12-09 PROCEDURE — 94669 MECHANICAL CHEST WALL OSCILL: CPT

## 2023-12-09 PROCEDURE — 82962 GLUCOSE BLOOD TEST: CPT

## 2023-12-09 PROCEDURE — 700102 HCHG RX REV CODE 250 W/ 637 OVERRIDE(OP)

## 2023-12-09 PROCEDURE — 302098 PASTE RING (FLAT): Performed by: SURGERY

## 2023-12-09 PROCEDURE — 97602 WOUND(S) CARE NON-SELECTIVE: CPT

## 2023-12-09 PROCEDURE — 700105 HCHG RX REV CODE 258

## 2023-12-09 RX ADMIN — CELECOXIB 200 MG: 200 CAPSULE ORAL at 16:54

## 2023-12-09 RX ADMIN — PIPERACILLIN AND TAZOBACTAM 4.5 G: 4; .5 INJECTION, POWDER, FOR SOLUTION INTRAVENOUS at 09:32

## 2023-12-09 RX ADMIN — FAMOTIDINE 20 MG: 10 INJECTION, SOLUTION INTRAVENOUS at 06:02

## 2023-12-09 RX ADMIN — SODIUM CHLORIDE, POTASSIUM CHLORIDE, SODIUM LACTATE AND CALCIUM CHLORIDE: 600; 310; 30; 20 INJECTION, SOLUTION INTRAVENOUS at 00:14

## 2023-12-09 RX ADMIN — PIPERACILLIN AND TAZOBACTAM 4.5 G: 4; .5 INJECTION, POWDER, FOR SOLUTION INTRAVENOUS at 06:01

## 2023-12-09 RX ADMIN — FAMOTIDINE 20 MG: 10 INJECTION, SOLUTION INTRAVENOUS at 00:11

## 2023-12-09 RX ADMIN — OXYCODONE HYDROCHLORIDE 10 MG: 10 TABLET ORAL at 06:02

## 2023-12-09 RX ADMIN — LEVOTHYROXINE SODIUM 150 MCG: 0.15 TABLET ORAL at 19:55

## 2023-12-09 RX ADMIN — OXYCODONE HYDROCHLORIDE 10 MG: 10 TABLET ORAL at 14:27

## 2023-12-09 RX ADMIN — HYDROMORPHONE HYDROCHLORIDE 0.5 MG: 1 INJECTION, SOLUTION INTRAMUSCULAR; INTRAVENOUS; SUBCUTANEOUS at 10:49

## 2023-12-09 RX ADMIN — OXYCODONE HYDROCHLORIDE 10 MG: 10 TABLET ORAL at 19:55

## 2023-12-09 RX ADMIN — ENOXAPARIN SODIUM 40 MG: 100 INJECTION SUBCUTANEOUS at 17:06

## 2023-12-09 RX ADMIN — DOCUSATE SODIUM 100 MG: 100 CAPSULE, LIQUID FILLED ORAL at 06:02

## 2023-12-09 RX ADMIN — ACETAMINOPHEN 1000 MG: 500 TABLET, FILM COATED ORAL at 16:53

## 2023-12-09 RX ADMIN — ACETAMINOPHEN 1000 MG: 500 TABLET, FILM COATED ORAL at 06:06

## 2023-12-09 RX ADMIN — PIPERACILLIN AND TAZOBACTAM 4.5 G: 4; .5 INJECTION, POWDER, FOR SOLUTION INTRAVENOUS at 22:00

## 2023-12-09 RX ADMIN — CELECOXIB 200 MG: 200 CAPSULE ORAL at 06:05

## 2023-12-09 RX ADMIN — DOCUSATE SODIUM 100 MG: 100 CAPSULE, LIQUID FILLED ORAL at 16:54

## 2023-12-09 RX ADMIN — OXYCODONE HYDROCHLORIDE 10 MG: 10 TABLET ORAL at 09:35

## 2023-12-09 RX ADMIN — HYDROMORPHONE HYDROCHLORIDE 0.5 MG: 1 INJECTION, SOLUTION INTRAMUSCULAR; INTRAVENOUS; SUBCUTANEOUS at 00:11

## 2023-12-09 RX ADMIN — SODIUM CHLORIDE, POTASSIUM CHLORIDE, SODIUM LACTATE AND CALCIUM CHLORIDE: 600; 310; 30; 20 INJECTION, SOLUTION INTRAVENOUS at 22:30

## 2023-12-09 RX ADMIN — FAMOTIDINE 20 MG: 20 TABLET ORAL at 16:54

## 2023-12-09 ASSESSMENT — COGNITIVE AND FUNCTIONAL STATUS - GENERAL
TURNING FROM BACK TO SIDE WHILE IN FLAT BAD: A LITTLE
HELP NEEDED FOR BATHING: A LITTLE
SUGGESTED CMS G CODE MODIFIER MOBILITY: CK
MOVING FROM LYING ON BACK TO SITTING ON SIDE OF FLAT BED: A LITTLE
TOILETING: A LITTLE
WALKING IN HOSPITAL ROOM: A LOT
STANDING UP FROM CHAIR USING ARMS: A LOT
CLIMB 3 TO 5 STEPS WITH RAILING: A LOT
MOBILITY SCORE: 15
MOVING TO AND FROM BED TO CHAIR: A LITTLE
DRESSING REGULAR UPPER BODY CLOTHING: A LITTLE
SUGGESTED CMS G CODE MODIFIER DAILY ACTIVITY: CJ
DAILY ACTIVITIY SCORE: 20
DRESSING REGULAR LOWER BODY CLOTHING: A LITTLE

## 2023-12-09 ASSESSMENT — ENCOUNTER SYMPTOMS
NAUSEA: 0
VOMITING: 0
ABDOMINAL PAIN: 1

## 2023-12-09 ASSESSMENT — COPD QUESTIONNAIRES
HAVE YOU SMOKED AT LEAST 100 CIGARETTES IN YOUR ENTIRE LIFE: YES
DURING THE PAST 4 WEEKS HOW MUCH DID YOU FEEL SHORT OF BREATH: NONE/LITTLE OF THE TIME
DO YOU EVER COUGH UP ANY MUCUS OR PHLEGM?: NO/ONLY WITH OCCASIONAL COLDS OR INFECTIONS
COPD SCREENING SCORE: 2

## 2023-12-09 ASSESSMENT — PATIENT HEALTH QUESTIONNAIRE - PHQ9
2. FEELING DOWN, DEPRESSED, IRRITABLE, OR HOPELESS: NOT AT ALL
SUM OF ALL RESPONSES TO PHQ9 QUESTIONS 1 AND 2: 0
1. LITTLE INTEREST OR PLEASURE IN DOING THINGS: NOT AT ALL

## 2023-12-09 ASSESSMENT — PAIN DESCRIPTION - PAIN TYPE
TYPE: SURGICAL PAIN
TYPE: ACUTE PAIN;SURGICAL PAIN
TYPE: ACUTE PAIN
TYPE: SURGICAL PAIN
TYPE: ACUTE PAIN;SURGICAL PAIN

## 2023-12-09 NOTE — ANESTHESIA POSTPROCEDURE EVALUATION
Patient: Stuart Nagy    Procedure Summary       Date: 12/08/23 Room / Location: [place] [id number] / SURGERY [facility]    Anesthesia Start: 1831 Anesthesia Stop: 2113    Procedure: SIGMOID COLECTOMY WITH COLOSTOMY CREATION (Abdomen) Diagnosis: (Perforated Sigmoid Colon)    Surgeons: Ariel Caal M.D. Responsible Provider: Randy Olivares M.D.    Anesthesia Type: general ASA Status: 3            Final Anesthesia Type: general  Last vitals  BP   Blood Pressure: 111/73    Temp   36.6 °C (97.9 °F)    Pulse   92   Resp   17    SpO2   96 %      Anesthesia Post Evaluation    Patient location during evaluation: PACU  Patient participation: complete - patient participated  Level of consciousness: awake and alert    Airway patency: patent  Anesthetic complications: no  Cardiovascular status: hemodynamically stable  Respiratory status: acceptable  Hydration status: euvolemic    PONV: none          No notable events documented.     Nurse Pain Score: 8 (NPRS)

## 2023-12-09 NOTE — CONSULTS
DATE OF CONSULTATION:  12/8/2023     REFERRING PHYSICIAN:   Mario Galvez M.D.     CONSULTING PHYSICIAN:  Ariel Caal M.D.     REASON FOR CONSULTATION:  I have been asked by Dr. Galvez to see the patient in surgical consultation for evaluation of abdominal pain and free air on CT imaging.    HISTORY OF PRESENT ILLNESS: The patient is a 48 year-old man who presents to the Emergency Department with acute onset moderate to severe abdominal pain earlier this morning.  The pain was initially most pronounced in the lower abdomen but is now somewhat generalized.  He denies any previous or prodromal similar symptoms.  He has no prior history of abdominal surgery he denies any significant alcohol or NSAID use.  He quit smoking 15 years ago.    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    PAST SURGICAL HISTORY: Right modified neck dissection and tonsillectomy with direct laryngoscopy and base of tongue biopsy in 2015.    ALLERGIES: No Known Allergies    CURRENT MEDICATIONS:    Home Medications       Reviewed by Guicho Lewis R.N. (Registered Nurse) on 12/08/23 at 1500  Med List Status: Partial     Medication Last Dose Status   amoxicillin (AMOXIL) 250 MG/5ML SUSR  Active   hydrocodone-acetaminophen (NORCO) 5-325 MG TABS per tablet  Active   hydrocodone-acetaminophen 2.5-108 mg/5mL (HYCET) 7.5-325 MG/15ML solution  Active   Multiple Vitamin (ONE-A-DAY MENS PO)  Active   ondansetron (ZOFRAN ODT) 8 MG TBDP  Active                FAMILY HISTORY: family history is not on file.    SOCIAL HISTORY:  reports that he quit smoking about 15 years ago. His smoking use included cigarettes. He started smoking about 30 years ago. He has a 15.0 pack-year smoking history. He has never used smokeless tobacco. He reports that he does not drink alcohol and does not use drugs.    REVIEW OF SYSTEMS: Comprehensive review of systems is negative with the exception of the aforementioned HPI, PMH, and PSH  bullets in accordance with CMS guidelines.    PHYSICAL EXAMINATION:    Physical Exam  Vitals and nursing note reviewed.   Constitutional:       Appearance: Normal appearance.   HENT:      Head: Normocephalic.   Eyes:      Extraocular Movements: Extraocular movements intact.      Conjunctiva/sclera: Conjunctivae normal.      Pupils: Pupils are equal, round, and reactive to light.   Neck:      Comments: Right MRND scar  Cardiovascular:      Rate and Rhythm: Regular rhythm. Tachycardia present.      Pulses: Normal pulses.   Pulmonary:      Effort: Pulmonary effort is normal. No respiratory distress.      Breath sounds: Normal breath sounds.   Abdominal:      Tenderness: There is guarding.   Musculoskeletal:         General: No swelling, deformity or signs of injury. Normal range of motion.      Cervical back: Normal range of motion. No tenderness.   Skin:     General: Skin is warm and dry.   Neurological:      General: No focal deficit present.      Mental Status: He is alert and oriented to person, place, and time.   Psychiatric:         Mood and Affect: Mood normal.         Behavior: Behavior normal.     LABORATORY VALUES:   Recent Labs     12/08/23  1500   WBC 12.4*   RBC 5.80   HEMOGLOBIN 17.6   HEMATOCRIT 49.8   MCV 85.9   MCH 30.3   MCHC 35.3   RDW 40.4   PLATELETCT 317   MPV 9.9     Recent Labs     12/08/23  1500   SODIUM 142   POTASSIUM 3.8   CHLORIDE 101   CO2 25   GLUCOSE 143*   BUN 20   CREATININE 1.36   CALCIUM 9.6     Recent Labs     12/08/23  1500   ASTSGOT 48*   ALTSGPT 46   TBILIRUBIN 1.1   ALKPHOSPHAT 107*   GLOBULIN 3.0     IMAGING:   CT-ABDOMEN-PELVIS WITH   Final Result         1. Moderate free intraperitoneal air, concerning for perforated viscus. There is free fluid surrounding the liver and in bilateral lower quadrant as well. The exact location of the perforation is unknown.      2. Significantly dilated rectum with large amount of stool and decompress sigmoid colon. This could relate to  stercoral colitis and could be a potential area for perforation.      3. A 3.7 cm right adrenal nodule with peripheral calcified wall, nonspecific but could be a pseudocyst or chronic hematoma.         CRITICAL RESULT READ BACK: Preliminary findings discussed with and critical read back performed by Dr. CELINA PENG in the Emergency Department via telephone on 12/8/2023 5:12 PM          ASSESSMENT AND PLAN:   Perforated viscus.  Suspect foregut source, however sigmoid colonic pathology a possibility.    PREOPERATIVE NOTE: I have seen and examined the patient today.  Critical physical examination findings, laboratory indices, and radiographic studies reviewed.  I recommend diagnostic laparoscopy as the initial surgical discriminating procedure.  Plan laparoscopic repair of perforated ulcer if present.  If a sigmoid colon perforation is present, this will likely require an open laparotomy and the potential for a temporary diverting colostomy exists.    The operative strategy was explained and reviewed. Alternatives discussed. Potential complications including, but not limited to, infection, bleeding, damage to adjacent structures, and anesthetic complications were discussed. Questions were elicited and answered to the patient's satisfaction.  Operative consent signed.       DISPOSITION: Admit [facility].     ____________________________________     Ariel Caal M.D.    DD: 12/8/2023  5:25 PM

## 2023-12-09 NOTE — OP REPORT
DATE OF OPERATION:   12/8/2023    PREOPERATIVE DIAGNOSIS:   Peritonitis. Free Air on CT imaging.     POSTOPERATIVE DIAGNOSIS:   Perforated sigmoid colon. Feculent peritonitis.     PROCEDURE PERFORMED:  Sigmoid colon resection with end colostomy. Negative pressure dressing application (greater than 50 cm²).    SURGEON:     Ariel Caal M.D.    ASSISTANT:     Linsey M. Wegener, APRN.     ANESTHESIOLOGIST:   Demarcus Puckett M.D. and Randy Olivares M.D.    ANESTHESIA:    General endotracheal anesthesia.    ASA CLASSIFICATION:   III.    INDICATIONS: The patient is a 48 year-old man with  acute peritonitis and free air on preoperative CT imaging.  He is taken to the operating room for diagnostic laparoscopy and any indicated subsequent procedures.    The nature of the surgical procedure warranted additional skilled operative assistance from an Advanced Registered Nurse Practitioner (ARNP). The assistant was present during the entire operation. The surgical assistant performed the following: provided assistance with optimal surgical exposure of the operative field, provided high complexity, subspecialty decision making input, and assisted with the surgical resection.    FINDINGS: Perforated sigmoid colon.  Massively dilated rectum.  No evidence of distal anorectal mass on digital rectal examination.  Feculent peritonitis involving all four quadrants of the abdomen.    WOUND CLASSIFICATION:  Class IV, Dirty or Infected. Infection present at the time of surgery (PATOS).    SPECIMEN:      Peritoneal fluid for Gram stain culture and sensitivity. Sigmoid colon.    ESTIMATED BLOOD LOSS:   150 mL.    DESCRIPTION OF PROCEDURE:  Following informed consent, the patient was properly identified, taken to the operating room and placed in supine position where general endotracheal anesthesia was administered. Intravenous antibiotics were administered in the Emergency Department within the correct time interval. The patient  voided prior to surgery. A urinary catheter was not placed. Sequential compression devices were applied.  Active warming measures were employed.  The operative field was widely prepped and draped into a sterile field.    Marcaine 0.5% was used to infiltrate the port sites. A 5 mm infraumbilical midline incision was made and the subcutaneous tissues spread bluntly. The fascia was elevated with a hook and a Veress needle was atraumatically inserted. Carbon dioxide pneumoperitoneum was instilled. A 5 mm separator port was passed. A 5 mm 30 degree lens and camera was passed into the peritoneal cavity. An 11 mm port was placed in the left subcostal region under direct vision. A 5 mm right subcostal port was placed under direct vision.     The upper abdomen was inspected with findings detailed above.  Inflammatory omental adhesions to the gallbladder were bluntly taken down to delineate the entire anterior surface of the stomach and duodenum.  No evidence of perforation was identified.  Further inspection in the pelvis demonstrated the most abundant contamination in the left lower quadrant.  At this point further laparoscopic conduct was aborted and the case was converted to an open laparotomy.    A full midline incision was made.  The musculofascial layers were divided with electrocautery. The peritoneum was entered sharply and opened to the full extent of the incision. A Bookwalter Retractor System was employed to facilitate optimal exposure. Adhesiolysis was completed. A complete abdominal survey was conducted with the findings as detailed above.      A sigmoid colon resection was carried out. The sigmoid colon was resected proximal and distal to the site of the sigmoid colon segment perforation with multiple firings of a PROXIMATE 60 mm Reloadable Linear Stapler (TX) with Blue Regular staple loads.  The distal staple line of the massively enlarged sigmoid colon was reinforced with interrupted imbricating 3-0 VICRYL®  Plus Antibacterial sutures. An end colostomy was fashioned. A site was chosen in the left lower quadrant for the ostomy site. The skin was grasped with Kocher clamp. An oval incision was made using the scalpel. The anterior rectus sheath was exposed and incised in a cruciate fashion. The rectus muscle was  in the direction of its fibers. The colon was carefully delivered 2 cm above the abdominal wall ensuring no tension or twisting.     A 19 Taiwanese round channel drain was placed into the dependent pelvis and secured to the skin at the right lower quadrant stab wound site with a 3-0 PERMAHAND® Silk suture. The abdominal contents were returned to the normal anatomic position with interposition of Seprafilm. The fascia was approximated with a pair of running #1 VICRYL® Plus Antibacterial sutures. A VAC dressing was trimmed to the optimal dimensions and placed within the subcutaneous tissue. A V.A.C.® dressing was secured to the edges of the wound with interrupted staples. The self-adhesive drape was applied and connected to closed suction drainage.     Following definitive closure of the abdomen, the staple line of the end colostomy was excised and the colostomy matured to the skin with interrupted 3-0 VICRYL® Plus Antibacterial sutures.     The patient tolerated the procedure well, and there were no apparent complications. All sponge, needle, and instrument counts were correct on 2 separate occasions. The patient was awakened, extubated, and transferred to  to the post anesthesia care unit (PACU) in satisfactory condition.       ____________________________________     Ariel Caal M.D.    DD: 12/8/2023  9:13 PM

## 2023-12-09 NOTE — CARE PLAN
Problem: Pain - Standard  Goal: Alleviation of pain or a reduction in pain to the patient’s comfort goal  Outcome: Progressing     Problem: Fall Risk  Goal: Patient will remain free from falls  Outcome: Progressing     Problem: Skin Care - Ostomy  Goal: Skin remains free from irritation  Outcome: Progressing     Problem: Skin Integrity  Goal: Skin integrity is maintained or improved  Outcome: Progressing   The patient is Stable - Low risk of patient condition declining or worsening    Shift Goals  Clinical Goals: pain mgmt' safety; wound mgmt  Patient Goals: pain control; rest    Progress made toward(s) clinical / shift goals:  yes

## 2023-12-09 NOTE — ED NOTES
Assist RN medicated patient per MAR. Patient denies further needs. Call light within reach. Patient aware of need for urine sample. Urinal at bedside.

## 2023-12-09 NOTE — PROGRESS NOTES
A&O x 4. On 2L of oxygen.  Patient verbalizes 8/10 pain. Pain managed with prescribed and PRN medications.  Denies N&V. Tolerating NPO diet.  Skin per flow sheets. Lap sites and MLI with wound vac. RLQ RAIMUNDO and LLQ ostomy.  NG tube to R nare to LIS. Patent and to suction.  + void (into bhandari catheter), + BM PTA.  Pt can ambulate with x1 assist using a FWW.  SCDs on to BLE; VTE prophylaxis contraindicated.

## 2023-12-09 NOTE — WOUND TEAM
Contacted [facility] team regarding Vac changes.  Okay to begin Vac changes per protocol.    Wound team will begin Vac changes either Sun or Mon.

## 2023-12-09 NOTE — ANESTHESIA PROCEDURE NOTES
Airway    Date/Time: 12/8/2023 6:38 PM    Performed by: Demarcus Puckett M.D.  Authorized by: Demarcus Puckett M.D.    Location:  OR  Urgency:  Elective  Indications for Airway Management:  Anesthesia      Spontaneous Ventilation: absent    Sedation Level:  Deep  Preoxygenated: Yes    Patient Position:  Sniffing  Final Airway Type:  Endotracheal airway  Final Endotracheal Airway:  ETT  Cuffed: Yes    Technique Used for Successful ETT Placement:  Direct laryngoscopy    Insertion Site:  Oral  Blade Type:  Ed  Laryngoscope Blade/Videolaryngoscope Blade Size:  3  ETT Size (mm):  7.0  Measured from:  Teeth  ETT to Teeth (cm):  21  Placement Verified by: auscultation and capnometry    Cormack-Lehane Classification:  Grade III - view of epiglottis only  Number of Attempts at Approach:  1

## 2023-12-09 NOTE — OR NURSING
Awake, Alert. NPO except for sips and meds. NG hooked up to low intermittent suction, small amount of dark sanguionous output. Dressings clean, dry and intact.  Vitals stable.  On monitors with alarms audible.      Called Matteo (brother of pt) with update.

## 2023-12-09 NOTE — OR NURSING
EVERARDO Hale aware pt is going up to [facility] now with transport. On 2 L nc, tolerating at 98% prior to leaving. Pain tolerable, no nausea. Adhikari drained.

## 2023-12-09 NOTE — ASSESSMENT & PLAN NOTE
Acute perforated sigmoid colon with feculent peritonitis.  12/8 Exploratory laparotomy, sigmoid colon resection and end colostomy creation.    12/11 DC NG tube, advance to full liquid.   12/12 Zosyn day 4 of 4 following definitive source control.  Intraoperative peritoneal cultures + E.coli, pan sensitive.   -Pathology discussed with patient  -Tolerating fulls, advance to GI soft.   12/13 Emesis overnight. Xray with air filled distended loops of bowel.   - Full liquid diet.  12/14 No emesis reported. Tolerating diet. Advanced to GI soft.  ACS Blue.

## 2023-12-09 NOTE — PROGRESS NOTES
Report received from RN, assumed care at 0715  Pt is A0X4, and responds appropriately   Pt declines any SOB, chest pain, new onset of numbness/ tingling  Pt rates pain at 7/10, on a scale of 1-10, pt medicated per MAR  Pt is voiding adequatly and without hesitancy  NG tube to R nare to LS  + void via bhandari  Pt ambulates with a x 1 assist with FWW per report  Pt is NPO with sips, pt denies any nausea/vomiting  Plan of care discussed, all questions answered. Explained importance of calling before getting OOB and pt verbalizes understanding. Explained importance of oral care. Call light is within reach, treaded slipper socks on, bed in lowest/ locked position, hourly rounding in place, all needs met at this time

## 2023-12-09 NOTE — OR NURSING
Pre-op assessment complete, vital signs stable, and pt updated on the plan of care. Call light within reach. No further needs at this time.

## 2023-12-09 NOTE — PROGRESS NOTES
4 Eyes Skin Assessment Completed by EVERARDO Hale and EVERARDO Evans.    Head WDL  Ears WDL  Nose R nare NG with tape  Mouth WDL  Neck WDL  Breast/Chest WDL  Shoulder Blades WDL  Spine WDL  (R) Arm/Elbow/Hand WDL  (L) Arm/Elbow/Hand WDL  Abdomen Incision; lap sites with MLI and wound vac. Ostomy to LQ and RAIMUNDO to RLQ - all with transparent drg.  Groin WDL; bhandari  Scrotum/Coccyx/Buttocks WDL  (R) Leg WDL  (L) Leg WDL  (R) Heel/Foot/Toe WDL  (L) Heel/Foot/Toe WDL      Devices In Places Pulse Ox, Bhandari, SCD's, OG/NG, and Nasal Cannula      Interventions In Place NC W/Ear Foams, TAP System, Pillows, Low Air Loss Mattress, Barrier Cream, and Dri-Chi Pads    Possible Skin Injury No    Pictures Uploaded Into Epic N/A  Wound Consult Placed yes  RN Wound Prevention Protocol Ordered yes

## 2023-12-09 NOTE — WOUND TEAM
" [facility]  Inpatient Services  New Ostomy Initial Evaluation    HPI:  Reviewed  PMH: Reviewed   SH: Reviewed         Past Surgical History:   Procedure Laterality Date    TONSILLECTOMY  2/6/2015    Performed by Fernando Fair M.D. at SURGERY SAME DAY [facility]    LARYNGOSCOPY  2/6/2015    Performed by Fernando Fair M.D. at SURGERY SAME DAY [facility]    LYMPH NODE EXCISION  1/30/2015    Performed by Fernando Fair M.D. at SURGERY SAME DAY [facility]       Surgery Date: 12/8/23    Surgeon(s):  Ariel Caal M.D.    Procedure(s):  LAPAROSCOPY     Permanence: Unknown    Pertinent History:  48 y.o. male admitted for perforated sigmoid colon and feculent peritonitis resulting in end colostomy creation by Dr. Caal on 12/8.  PMH metastatic squamous cell carcinoma of R neck.                       Colostomy 12/08/23 Sigmoid LLQ (Active)   Wound Image   12/09/23 1000   Stomal Appliance Assessment Changed 12/09/23 1000   Stoma Assessment Beefy red;Edema 12/09/23 1000   Stoma Shape Budded Greater Than One Inch;Round 12/09/23 1000   Peristomal Assessment Clean;Dry;Intact 12/09/23 1000   Mucocutaneous Junction Intact 12/09/23 1000   Treatment Appliance Changed 12/09/23 1000   Peristomal Protectant Paste Ring 12/09/23 1000   Output (mL) 5 mL 12/09/23 1000   WOUND RN ONLY - Stomal Appliance  2 Piece;Paste Ring, 2\";2 3/4\" (70mm) CTF 12/09/23 1000   Appliance (Pouch) # [id number] 12/09/23 1000   Appliance Brand Mode 12/09/23 1000   Secure Start completed Yes 12/09/23 1000   WOUND NURSE ONLY - Time Spent with Patient (mins) 45 12/09/23 1000              Colostomy Interventions:  Removed appliance (using push pull method) - By Ostomy RN  Cleansed dewey-stomal skin with moist warm washcloth - By Ostomy RN  Created/Checked template fit - By Ostomy RN  Traced Shape to back of barrier - By Ostomy RN  Cut barrier to stoma size - By Ostomy RN  Confirmed fit - By Ostomy RN  Removed plastic backing and " "\"Dog Eared\" paper edges - By Ostomy RN  Stretched paste ring to fit barrier opening - By Ostomy RN  Applied paste ring to back of barrier - By Ostomy RN  Applied barrier to skin and adhered with friction - By Ostomy RN  Attached pouch - By Ostomy RN  Closed Pouch end - By Ostomy RN    Ostomy Nurse Plan of Care - Frequency of Follow-up:   Ostomy nurses to continue to follow for ostomy needs and teaching until patient independent with care or discharge.  Ostomy Nurse follow-up frequency:  With wound vac changes    Patient Education:   All questions answered, procedure explained, and education provided.       Date:  12/09/23  Folder/paperwork delivered  Educated regarding the following things: stoma size/shape. Stoma will likely change sizes in the first 4 to 6 weeks. Currently using the most basic supplies while in the hospital, there are many brands and options in regards to supplies.  Educated on when to empty and how to empty, burping appliance, Wear time, Diet (chewing food well) and hydration, Medications, activity including showering and swimming. Pt aware that they should keep an extra set of appliances with them at all times (do not leave in warm cars).  Booklet inside of folder went over, but not other pamphlets in folder      Needs for next visit: Go over pamphlets in folder (excluding booklet).    Evaluation:      Date:  12/09/23    Patient observing appliance change. Briefer education session provided today as patient is just recently out of surgery.  Additional 2 3/4\" two pieces appliances ordered for next change.        Flatus: Not Present  Stool Output: serosanguinous in pouch and minimum  Urine Output: NA, Fecal Ostomy  Mobility:  Unknown     DIET ORDERS (From admission to next 24h)       Start     Ordered    12/08/23 2104  Diet NPO Restrict to: Sips with Medications  ALL MEALS        Question:  Diet NPO Restrict to:  Answer:  Sips with Medications    12/08/23 2107                     Secure Start " Signed:  Completed by Wound team Tech  Outpatient Referral Placed:  Yes     5 Sets of appliances in Ostomy bag for discharge:  Ordered    INSURANCE OPTIONS:   If patient has [facility] plus patient will need an [employer] book. If patientt has Medicaid be sure patient has care chest paperwork.    Currently Active Insurance       Payor Plan    [facility]            Anticipated Discharge Plans:  TBD    Ostomy Supplies for DC:  To be determined in 4 to 6 weeks once stomal edema has fully resolved

## 2023-12-09 NOTE — ED NOTES
Medicated patient per MAR. Phone report given to Preop RN. Patient transport at bedside to take patient to preop with chart and all belongings.

## 2023-12-09 NOTE — ANESTHESIA PREPROCEDURE EVALUATION
Case: [id number] Date/Time: 12/08/23 1820    Procedure: LAPAROSCOPY (Abdomen)    Anesthesia type: General    Location: [place] [address] / SURGERY [facility]    Surgeons: Ariel Caal M.D.            Relevant Problems   ENDO   (positive) Hypothyroid       Physical Exam    Airway   Mallampati: II  TM distance: >3 FB  Neck ROM: full       Cardiovascular - normal exam  Rhythm: regular  Rate: normal  (-) murmur     Dental - normal exam           Pulmonary - normal exam  Breath sounds clear to auscultation     Abdominal    Neurological - normal exam                   Anesthesia Plan    ASA 3       Plan - general       Airway plan will be ETT          Induction: intravenous    Postoperative Plan: Postoperative administration of opioids is intended.    Pertinent diagnostic labs and testing reviewed    Informed Consent:    Anesthetic plan and risks discussed with patient.    Use of blood products discussed with: patient whom consented to blood products.

## 2023-12-09 NOTE — OR NURSING
Report given to EVERARDO Hale on [facility]. Nurse updated that brother Matteo wanting an update from the surgeon tomorrow and that he would like a call if any emergent changes in the night. Number provided to EVERARDO Hale

## 2023-12-09 NOTE — PROGRESS NOTES
"    DATE: 12/9/2023    Post Operative Day  1 exploratory laparotomy and bowel resection.    INTERVAL EVENTS:  Doing well.  Pain as expected.  No flatus or stool noted.   Surgical drain with s/s output.  Denies nausea or vomiting.     REVIEW OF SYSTEMS:  Review of Systems   Constitutional:  Positive for malaise/fatigue.   Gastrointestinal:  Positive for abdominal pain. Negative for nausea and vomiting.       PHYSICAL EXAMINATION:  Vital Signs: /72   Pulse 94   Temp 36.6 °C (97.9 °F) (Temporal)   Resp 16   Ht 1.829 m (6' 0.01\")   Wt 99.8 kg (220 lb)   SpO2 94%   Physical Exam  Vitals and nursing note reviewed.   Constitutional:       General: He is not in acute distress.     Appearance: Normal appearance.   Cardiovascular:      Rate and Rhythm: Normal rate and regular rhythm.   Pulmonary:      Effort: Pulmonary effort is normal. No respiratory distress.   Abdominal:      General: Abdomen is flat. Bowel sounds are normal.      Palpations: Abdomen is soft.      Comments: Abdomen soft, flat, tender. ML wound vac functioning. Ostomy is viable, no flatus or stool noted. S/s drainage in ostomy pouch.    Musculoskeletal:         General: Normal range of motion.   Skin:     General: Skin is warm and dry.      Capillary Refill: Capillary refill takes less than 2 seconds.   Neurological:      General: No focal deficit present.      Mental Status: He is alert and oriented to person, place, and time. Mental status is at baseline.   Psychiatric:         Mood and Affect: Mood normal.         LABORATORY VALUES:   Recent Labs     12/08/23  1500 12/09/23  0348   WBC 12.4* 12.9*   RBC 5.80 5.31   HEMOGLOBIN 17.6 16.0   HEMATOCRIT 49.8 45.9   MCV 85.9 86.4   MCH 30.3 30.1   MCHC 35.3 34.9   RDW 40.4 41.2   PLATELETCT 317 274   MPV 9.9 10.1     Recent Labs     12/08/23  1500 12/09/23  0348   SODIUM 142 141   POTASSIUM 3.8 4.9   CHLORIDE 101 103   CO2 25 25   GLUCOSE 143* 137*   BUN 20 21   CREATININE 1.36 1.42*   CALCIUM 9.6 " 8.4*     Recent Labs     12/08/23  1500   ASTSGOT 48*   ALTSGPT 46   TBILIRUBIN 1.1   ALKPHOSPHAT 107*   GLOBULIN 3.0            IMAGING:   CT-ABDOMEN-PELVIS WITH   Final Result         1. Moderate free intraperitoneal air, concerning for perforated viscus. There is free fluid surrounding the liver and in bilateral lower quadrant as well. The exact location of the perforation is unknown.      2. Significantly dilated rectum with large amount of stool and decompress sigmoid colon. This could relate to stercoral colitis and could be a potential area for perforation.      3. A 3.7 cm right adrenal nodule with peripheral calcified wall, nonspecific but could be a pseudocyst or chronic hematoma.         CRITICAL RESULT READ BACK: Preliminary findings discussed with and critical read back performed by Dr. CELINA PENG in the Emergency Department via telephone on 12/8/2023 5:12 PM          Medications reviewed, Radiology images reviewed and Labs reviewed  Adhikari catheter: No Adhikari      DVT Prophylaxis: Enoxaparin (Lovenox)    Ulcer prophylaxis: Yes  Antibiotics: Treating active infection/contamination beyond 24 hours perioperative coverage  Assessed for rehab: Patient returned to prior level of function, rehabilitation not indicated at this time      ASSESSMENT AND PLAN:   Perforation of sigmoid colon (HCC)- (present on admission)  Assessment & Plan  Acute perforated sigmoid colon with feculent peritonitis.  12/8 Exploratory laparotomy, sigmoid colon resection and end colostomy creation.    12/9 Zosyn day 1 of 4 following definitive source control.  Intraoperative peritoneal cultures pending.  Ariel Caal MD. General Surgery. ACS Blue.    Elevated serum creatinine  Assessment & Plan  Gentle fluid rehydration.  Avoid nephrotoxins.  Trend.    Hypothyroid- (present on admission)  Assessment & Plan  Chronic condition treated with levothyroxine.  Resumed maintenance medication on admission.        Discussed patient  condition with RN, Patient, and general surgery .

## 2023-12-10 LAB
ANION GAP SERPL CALC-SCNC: 8 MMOL/L (ref 7–16)
ANISOCYTOSIS BLD QL SMEAR: ABNORMAL
BACTERIA WND AEROBE CULT: ABNORMAL
BACTERIA WND AEROBE CULT: ABNORMAL
BASOPHILS # BLD AUTO: 0 % (ref 0–1.8)
BASOPHILS # BLD: 0 K/UL (ref 0–0.12)
BUN SERPL-MCNC: 22 MG/DL (ref 8–22)
BURR CELLS BLD QL SMEAR: NORMAL
CALCIUM SERPL-MCNC: 8.7 MG/DL (ref 8.5–10.5)
CHLORIDE SERPL-SCNC: 104 MMOL/L (ref 96–112)
CO2 SERPL-SCNC: 27 MMOL/L (ref 20–33)
CREAT SERPL-MCNC: 1.34 MG/DL (ref 0.5–1.4)
EOSINOPHIL # BLD AUTO: 0 K/UL (ref 0–0.51)
EOSINOPHIL NFR BLD: 0 % (ref 0–6.9)
ERYTHROCYTE [DISTWIDTH] IN BLOOD BY AUTOMATED COUNT: 41.7 FL (ref 35.9–50)
GFR SERPLBLD CREATININE-BSD FMLA CKD-EPI: 66 ML/MIN/1.73 M 2
GLUCOSE SERPL-MCNC: 102 MG/DL (ref 65–99)
GRAM STN SPEC: ABNORMAL
HCT VFR BLD AUTO: 38.3 % (ref 42–52)
HGB BLD-MCNC: 13.4 G/DL (ref 14–18)
LYMPHOCYTES # BLD AUTO: 1.73 K/UL (ref 1–4.8)
LYMPHOCYTES NFR BLD: 12.2 % (ref 22–41)
MANUAL DIFF BLD: NORMAL
MCH RBC QN AUTO: 30.3 PG (ref 27–33)
MCHC RBC AUTO-ENTMCNC: 35 G/DL (ref 32.3–36.5)
MCV RBC AUTO: 86.7 FL (ref 81.4–97.8)
METAMYELOCYTES NFR BLD MANUAL: 0.9 %
MICROCYTES BLD QL SMEAR: ABNORMAL
MONOCYTES # BLD AUTO: 0.61 K/UL (ref 0–0.85)
MONOCYTES NFR BLD AUTO: 4.3 % (ref 0–13.4)
MORPHOLOGY BLD-IMP: NORMAL
NEUTROPHILS # BLD AUTO: 11.73 K/UL (ref 1.82–7.42)
NEUTROPHILS NFR BLD: 78.3 % (ref 44–72)
NEUTS BAND NFR BLD MANUAL: 4.3 % (ref 0–10)
NRBC # BLD AUTO: 0 K/UL
NRBC BLD-RTO: 0 /100 WBC (ref 0–0.2)
OVALOCYTES BLD QL SMEAR: NORMAL
PLATELET # BLD AUTO: 209 K/UL (ref 164–446)
PLATELET BLD QL SMEAR: NORMAL
PMV BLD AUTO: 10.2 FL (ref 9–12.9)
POIKILOCYTOSIS BLD QL SMEAR: NORMAL
POTASSIUM SERPL-SCNC: 4 MMOL/L (ref 3.6–5.5)
RBC # BLD AUTO: 4.42 M/UL (ref 4.7–6.1)
RBC BLD AUTO: PRESENT
SIGNIFICANT IND 70042: ABNORMAL
SITE SITE: ABNORMAL
SODIUM SERPL-SCNC: 139 MMOL/L (ref 135–145)
SOURCE SOURCE: ABNORMAL
WBC # BLD AUTO: 14.2 K/UL (ref 4.8–10.8)

## 2023-12-10 PROCEDURE — 94669 MECHANICAL CHEST WALL OSCILL: CPT

## 2023-12-10 PROCEDURE — 700111 HCHG RX REV CODE 636 W/ 250 OVERRIDE (IP): Mod: JZ | Performed by: SURGERY

## 2023-12-10 PROCEDURE — 85027 COMPLETE CBC AUTOMATED: CPT

## 2023-12-10 PROCEDURE — 80048 BASIC METABOLIC PNL TOTAL CA: CPT

## 2023-12-10 PROCEDURE — A9270 NON-COVERED ITEM OR SERVICE: HCPCS | Performed by: REGISTERED NURSE

## 2023-12-10 PROCEDURE — A9270 NON-COVERED ITEM OR SERVICE: HCPCS

## 2023-12-10 PROCEDURE — 700102 HCHG RX REV CODE 250 W/ 637 OVERRIDE(OP)

## 2023-12-10 PROCEDURE — 700105 HCHG RX REV CODE 258: Performed by: REGISTERED NURSE

## 2023-12-10 PROCEDURE — 85007 BL SMEAR W/DIFF WBC COUNT: CPT

## 2023-12-10 PROCEDURE — 700111 HCHG RX REV CODE 636 W/ 250 OVERRIDE (IP)

## 2023-12-10 PROCEDURE — 700105 HCHG RX REV CODE 258: Performed by: SURGERY

## 2023-12-10 PROCEDURE — 99024 POSTOP FOLLOW-UP VISIT: CPT | Performed by: REGISTERED NURSE

## 2023-12-10 PROCEDURE — 770001 HCHG ROOM/CARE - MED/SURG/GYN PRIV*

## 2023-12-10 PROCEDURE — 36415 COLL VENOUS BLD VENIPUNCTURE: CPT

## 2023-12-10 PROCEDURE — 700102 HCHG RX REV CODE 250 W/ 637 OVERRIDE(OP): Performed by: REGISTERED NURSE

## 2023-12-10 RX ADMIN — CELECOXIB 200 MG: 200 CAPSULE ORAL at 05:47

## 2023-12-10 RX ADMIN — CELECOXIB 200 MG: 200 CAPSULE ORAL at 17:52

## 2023-12-10 RX ADMIN — SODIUM CHLORIDE, POTASSIUM CHLORIDE, SODIUM LACTATE AND CALCIUM CHLORIDE: 600; 310; 30; 20 INJECTION, SOLUTION INTRAVENOUS at 13:46

## 2023-12-10 RX ADMIN — GLYCERIN 2.3 ML: 2.8 LIQUID RECTAL at 21:48

## 2023-12-10 RX ADMIN — FAMOTIDINE 20 MG: 20 TABLET ORAL at 17:52

## 2023-12-10 RX ADMIN — ACETAMINOPHEN 1000 MG: 500 TABLET, FILM COATED ORAL at 00:03

## 2023-12-10 RX ADMIN — PIPERACILLIN AND TAZOBACTAM 4.5 G: 4; .5 INJECTION, POWDER, FOR SOLUTION INTRAVENOUS at 05:54

## 2023-12-10 RX ADMIN — PIPERACILLIN AND TAZOBACTAM 4.5 G: 4; .5 INJECTION, POWDER, FOR SOLUTION INTRAVENOUS at 13:47

## 2023-12-10 RX ADMIN — DOCUSATE SODIUM 100 MG: 100 CAPSULE, LIQUID FILLED ORAL at 05:47

## 2023-12-10 RX ADMIN — ACETAMINOPHEN 1000 MG: 500 TABLET, FILM COATED ORAL at 11:28

## 2023-12-10 RX ADMIN — DOCUSATE SODIUM 100 MG: 100 CAPSULE, LIQUID FILLED ORAL at 17:52

## 2023-12-10 RX ADMIN — OXYCODONE 5 MG: 5 TABLET ORAL at 22:07

## 2023-12-10 RX ADMIN — LEVOTHYROXINE SODIUM 150 MCG: 0.15 TABLET ORAL at 21:48

## 2023-12-10 RX ADMIN — ACETAMINOPHEN 1000 MG: 500 TABLET, FILM COATED ORAL at 17:52

## 2023-12-10 RX ADMIN — ENOXAPARIN SODIUM 40 MG: 100 INJECTION SUBCUTANEOUS at 17:52

## 2023-12-10 RX ADMIN — ACETAMINOPHEN 1000 MG: 500 TABLET, FILM COATED ORAL at 05:47

## 2023-12-10 RX ADMIN — PIPERACILLIN AND TAZOBACTAM 4.5 G: 4; .5 INJECTION, POWDER, FOR SOLUTION INTRAVENOUS at 21:48

## 2023-12-10 RX ADMIN — FAMOTIDINE 20 MG: 10 INJECTION, SOLUTION INTRAVENOUS at 05:51

## 2023-12-10 RX ADMIN — SODIUM CHLORIDE, POTASSIUM CHLORIDE, SODIUM LACTATE AND CALCIUM CHLORIDE: 600; 310; 30; 20 INJECTION, SOLUTION INTRAVENOUS at 21:24

## 2023-12-10 RX ADMIN — ONDANSETRON 4 MG: 4 TABLET, ORALLY DISINTEGRATING ORAL at 13:45

## 2023-12-10 ASSESSMENT — LIFESTYLE VARIABLES
TOTAL SCORE: 0
HAVE PEOPLE ANNOYED YOU BY CRITICIZING YOUR DRINKING: NO
ON A TYPICAL DAY WHEN YOU DRINK ALCOHOL HOW MANY DRINKS DO YOU HAVE: 0
CONSUMPTION TOTAL: NEGATIVE
TOTAL SCORE: 0
DOES PATIENT WANT TO STOP DRINKING: NO
AVERAGE NUMBER OF DAYS PER WEEK YOU HAVE A DRINK CONTAINING ALCOHOL: 0
TOTAL SCORE: 0
EVER HAD A DRINK FIRST THING IN THE MORNING TO STEADY YOUR NERVES TO GET RID OF A HANGOVER: NO
HOW MANY TIMES IN THE PAST YEAR HAVE YOU HAD 5 OR MORE DRINKS IN A DAY: 0
ALCOHOL_USE: NO
HAVE YOU EVER FELT YOU SHOULD CUT DOWN ON YOUR DRINKING: NO
EVER FELT BAD OR GUILTY ABOUT YOUR DRINKING: NO

## 2023-12-10 ASSESSMENT — ENCOUNTER SYMPTOMS
VOMITING: 0
ABDOMINAL PAIN: 1
NAUSEA: 0

## 2023-12-10 ASSESSMENT — PAIN DESCRIPTION - PAIN TYPE
TYPE: ACUTE PAIN

## 2023-12-10 NOTE — CARE PLAN
The patient is Stable - Low risk of patient condition declining or worsening    Shift Goals  Clinical Goals: Pain control, monitor drains, and IS  Patient Goals: Pain control and rest    Progress made toward(s) clinical / shift goals:  Pain controlled per MAR. Intermittently monitoring NG, RAIMUNDO, [place], and ostomy output. Encouraging pt to use IS and demonstrated to RN. Pt slept intermittently throughout shift.     Problem: Pain - Standard  Goal: Alleviation of pain or a reduction in pain to the patient’s comfort goal  Outcome: Progressing     Problem: Knowledge Deficit - Ostomy  Goal: Patient will demonstrate ability to manage and maintain ostomy  Outcome: Progressing

## 2023-12-10 NOTE — CARE PLAN
The patient is Stable - Low risk of patient condition declining or worsening    Shift Goals  Clinical Goals: monitor pain, RAIMUNDO/wound vac output, clamped NG per MD, monitor UO/removed bhandari 0900  Patient Goals: pain control  Family Goals: GILLIAN    Progress made toward(s) clinical / shift goals:     Problem: Skin Care - Ostomy  Goal: Skin remains free from irritation  Outcome: Progressing  Note: Cont'd monitoring for output, scant serosanguineous drainage noted.     Problem: Knowledge Deficit - Standard  Goal: Patient and family/care givers will demonstrate understanding of plan of care, disease process/condition, diagnostic tests and medications  Outcome: Progressing  Note: Removed bhandari 0900, pt voiding spontaneously with no issues. NG clamped per MD order, minimal nausea noted relieved with sublingual zofran.

## 2023-12-10 NOTE — PROGRESS NOTES
"    DATE: 12/10/2023    Post Operative Day  2 exploratory laparotomy and bowel resection.    INTERVAL EVENTS:  Doing well.  Pain well managed.   Ambulating slowly.   No flatus or stool noted.   Surgical drain with s/s output.  Denies nausea or vomiting.     REVIEW OF SYSTEMS:  Review of Systems   Constitutional:  Positive for malaise/fatigue.   Gastrointestinal:  Positive for abdominal pain. Negative for nausea and vomiting.       PHYSICAL EXAMINATION:  Vital Signs: /66   Pulse 92   Temp 37.4 °C (99.3 °F) (Temporal)   Resp 17   Ht 1.829 m (6' 0.01\")   Wt 99.8 kg (220 lb)   SpO2 94%   Physical Exam  Vitals and nursing note reviewed.   Constitutional:       General: He is not in acute distress.     Appearance: Normal appearance.   Cardiovascular:      Rate and Rhythm: Normal rate and regular rhythm.   Pulmonary:      Effort: Pulmonary effort is normal. No respiratory distress.   Abdominal:      General: Abdomen is flat. Bowel sounds are normal.      Palpations: Abdomen is soft.      Comments: Abdomen soft, flat, tender. ML wound vac functioning. Ostomy is viable, no flatus or stool noted. S/s drainage in ostomy pouch.    Musculoskeletal:         General: Normal range of motion.   Skin:     General: Skin is warm and dry.      Capillary Refill: Capillary refill takes less than 2 seconds.   Neurological:      General: No focal deficit present.      Mental Status: He is alert and oriented to person, place, and time. Mental status is at baseline.   Psychiatric:         Mood and Affect: Mood normal.         LABORATORY VALUES:   Recent Labs     12/08/23  1500 12/09/23  0348 12/10/23  0418   WBC 12.4* 12.9* 14.2*   RBC 5.80 5.31 4.42*   HEMOGLOBIN 17.6 16.0 13.4*   HEMATOCRIT 49.8 45.9 38.3*   MCV 85.9 86.4 86.7   MCH 30.3 30.1 30.3   MCHC 35.3 34.9 35.0   RDW 40.4 41.2 41.7   PLATELETCT 317 274 209   MPV 9.9 10.1 10.2     Recent Labs     12/08/23  1500 12/09/23  0348 12/10/23  0418   SODIUM 142 141 139 "   POTASSIUM 3.8 4.9 4.0   CHLORIDE 101 103 104   CO2 25 25 27   GLUCOSE 143* 137* 102*   BUN 20 21 22   CREATININE 1.36 1.42* 1.34   CALCIUM 9.6 8.4* 8.7     Recent Labs     12/08/23  1500   ASTSGOT 48*   ALTSGPT 46   TBILIRUBIN 1.1   ALKPHOSPHAT 107*   GLOBULIN 3.0            IMAGING:   CT-ABDOMEN-PELVIS WITH   Final Result         1. Moderate free intraperitoneal air, concerning for perforated viscus. There is free fluid surrounding the liver and in bilateral lower quadrant as well. The exact location of the perforation is unknown.      2. Significantly dilated rectum with large amount of stool and decompress sigmoid colon. This could relate to stercoral colitis and could be a potential area for perforation.      3. A 3.7 cm right adrenal nodule with peripheral calcified wall, nonspecific but could be a pseudocyst or chronic hematoma.         CRITICAL RESULT READ BACK: Preliminary findings discussed with and critical read back performed by Dr. CELINA PENG in the Emergency Department via telephone on 12/8/2023 5:12 PM          Medications reviewed, Radiology images reviewed and Labs reviewed  Adhikari catheter: No Adhikari      DVT Prophylaxis: Enoxaparin (Lovenox)    Ulcer prophylaxis: Yes  Antibiotics: Treating active infection/contamination beyond 24 hours perioperative coverage  Assessed for rehab: Patient returned to prior level of function, rehabilitation not indicated at this time      ASSESSMENT AND PLAN:   Perforation of sigmoid colon (HCC)- (present on admission)  Assessment & Plan  Acute perforated sigmoid colon with feculent peritonitis.  12/8 Exploratory laparotomy, sigmoid colon resection and end colostomy creation.    12/10 Zosyn day 2 of 4 following definitive source control.  Intraoperative peritoneal cultures + E.coli.  -Pathology pending.  -Clamp NG, Ok for sips of clears/ice chips.  Ariel Caal MD. General Surgery. ACS Blue.    Elevated serum creatinine  Assessment & Plan  12/10  Improving  Gentle fluid rehydration.  Avoid nephrotoxins.  Trend.    Hypothyroid- (present on admission)  Assessment & Plan  Chronic condition treated with levothyroxine.  Resumed maintenance medication on admission.        Discussed patient condition with RN, Patient, and general surgery .

## 2023-12-10 NOTE — PROGRESS NOTES
Bedside report received, assessment completed    A&O x  4, pt calls appropriately  Mobility: Up with x2 assist, and FWW  Fall Risk Assessment: Moderate, bed alarm on, door notifications in use  Pain Assessment / Reassessment completed, medication provided per MAR  Diet: NPO, sips with meds  LDA:   IV Access: 20 R FA, CDI/ flushed/ Infusing LR at 125 mL/hr  RAIMUNDO: RLQ  Ostomy: LLQ  WV: MLI  Adhikari: CDI    GI/: + void, - flatus, -ostomy output  DVT Prophylaxis: Lovenox, SCD on    Reviewed plan of care with patient, bed in lowest position and locked, pt resting comfortably now, call light within reach, all needs met at this time. Interventions will be executed per plan of care

## 2023-12-10 NOTE — CARE PLAN
The patient is Stable - Low risk of patient condition declining or worsening    Shift Goals  Clinical Goals: pain control, safety, wound management  Patient Goals: pain control, rest    Progress made toward(s) clinical / shift goals:    Problem: Pain - Standard  Goal: Alleviation of pain or a reduction in pain to the patient’s comfort goal  Outcome: Progressing     Problem: Fall Risk  Goal: Patient will remain free from falls  Outcome: Progressing

## 2023-12-10 NOTE — DISCHARGE PLANNING
Case Management Discharge Planning    Admission Date: 12/8/2023  GMLOS: 9.8  ALOS: 2    6-Clicks ADL Score: 20  6-Clicks Mobility Score: 15  PT and/or OT Eval ordered: No  Post-acute Referrals Ordered: No  Post-acute Choice Obtained: No  Has referral(s) been sent to post-acute provider:  NA      Anticipated Discharge Dispo: Discharge Disposition: Discharged to home/self care (01)    Action(s) Taken: DC Assessment Complete (See below);     Escalations Completed: None    Medically Clear: No    Next Steps: CM to follow up with IDT regarding discharge barriers/needs, patient may need wound vac and outpatient wound clinic appointment upon discharge for new ostomy creation    Barriers to Discharge: Medical clearance    Is the patient up for discharge tomorrow: No      Care Transition Team Assessment    Case management role discussed with patient; patient verbalized understanding of case management role  Demographics verified  Patient is a 48 year old male with a PMH of 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) admitted to [facility] for c/o severe abdominal pain  Patient states he lives alone in an apartment on the first floor  Patient's preferred pharmacy is the [facility] on [facility]  Patient's reported monthly income is $5000  Prior to admission, patient was independent with IADLS/ADLS; patient denies baseline DME use; patient denies home O2 use  Patient may need wound vac/outpatient wound clinic appointment prior to discharge  Discharge planning discussed with patient; patient verbalized understanding that discharge plans are determined through patient's hospital course and through recommendations from IDT    Information Source  Orientation Level: Oriented X4  Information Given By: Patient  Informant's Name: Stuart  Who is responsible for making decisions for patient? : Patient    Readmission Evaluation  Is this a readmission?: No    Elopement Risk  Legal Hold: No  Ambulatory or Self Mobile in  Wheelchair: Yes  Disoriented: No  Psychiatric Symptoms: None  History of Wandering: No  Elopement this Admit: No  Vocalizing Wanting to Leave: No  Displays Behaviors, Body Language Wanting to Leave: No-Not at Risk for Elopement  Elopement Risk: Not at Risk for Elopement    Interdisciplinary Discharge Planning  Does Admitting Nurse Feel This Could be a Complex Discharge?: No  Primary Care Physician: Karan Sullivan-DO  Lives with - Patient's Self Care Capacity: Alone and Able to Care For Self  Patient or legal guardian wants to designate a caregiver: No  Support Systems: Friends / Neighbors  Housing / Facility: 1 Story Apartment / Condo  Do You Take your Prescribed Medications Regularly: Yes  Able to Return to Previous ADL's: Yes  Mobility Issues: No  Prior Services: None  Patient Prefers to be Discharged to:: Home  Assistance Needed: Unknown at this Time  Durable Medical Equipment: Unknown    Discharge Preparedness  What is your plan after discharge?: Home with help  What are your discharge supports?: Other (comment) (Friends/neighbors/coworkers)  Prior Functional Level: Ambulatory, Drives Self, Independent with Activities of Daily Living, Independent with Medication Management  Difficulity with ADLs: None  Difficulity with IADLs: None    Functional Assesment  Prior Functional Level: Ambulatory, Drives Self, Independent with Activities of Daily Living, Independent with Medication Management    Finances  Financial Barriers to Discharge: No  Prescription Coverage: Yes    Vision / Hearing Impairment  Vision Impairment : Yes  Right Eye Vision: Impaired, Wears Glasses  Left Eye Vision: Impaired, Wears Glasses  Hearing Impairment : No    Values / Beliefs / Concerns  Values / Beliefs Concerns : No    Advance Directive  Advance Directive?: None  Advance Directive offered?: AD Booklet refused    Domestic Abuse  Have you ever been the victim of abuse or violence?: No  Physical Abuse or Sexual Abuse: No  Verbal Abuse or  Emotional Abuse: No  Possible Abuse/Neglect Reported to:: Not Applicable    Psychological Assessment  History of Substance Abuse: None  History of Psychiatric Problems: No  Non-compliant with Treatment: No  Newly Diagnosed Illness: Yes    Discharge Risks or Barriers  Discharge risks or barriers?: Complex medical needs  Patient risk factors: Cognitive / sensory / physical deficit, Complex medical needs    Anticipated Discharge Information  Discharge Disposition: Discharged to home/self care (01)

## 2023-12-11 LAB
ANION GAP SERPL CALC-SCNC: 6 MMOL/L (ref 7–16)
BASOPHILS # BLD AUTO: 0.2 % (ref 0–1.8)
BASOPHILS # BLD: 0.02 K/UL (ref 0–0.12)
BUN SERPL-MCNC: 18 MG/DL (ref 8–22)
CALCIUM SERPL-MCNC: 8.3 MG/DL (ref 8.5–10.5)
CHLORIDE SERPL-SCNC: 106 MMOL/L (ref 96–112)
CO2 SERPL-SCNC: 28 MMOL/L (ref 20–33)
CREAT SERPL-MCNC: 1.36 MG/DL (ref 0.5–1.4)
EOSINOPHIL # BLD AUTO: 0.09 K/UL (ref 0–0.51)
EOSINOPHIL NFR BLD: 0.8 % (ref 0–6.9)
ERYTHROCYTE [DISTWIDTH] IN BLOOD BY AUTOMATED COUNT: 42.7 FL (ref 35.9–50)
GFR SERPLBLD CREATININE-BSD FMLA CKD-EPI: 65 ML/MIN/1.73 M 2
GLUCOSE SERPL-MCNC: 90 MG/DL (ref 65–99)
HCT VFR BLD AUTO: 32.3 % (ref 42–52)
HGB BLD-MCNC: 11.4 G/DL (ref 14–18)
IMM GRANULOCYTES # BLD AUTO: 0.06 K/UL (ref 0–0.11)
IMM GRANULOCYTES NFR BLD AUTO: 0.5 % (ref 0–0.9)
LYMPHOCYTES # BLD AUTO: 0.76 K/UL (ref 1–4.8)
LYMPHOCYTES NFR BLD: 6.4 % (ref 22–41)
MAGNESIUM SERPL-MCNC: 2 MG/DL (ref 1.5–2.5)
MCH RBC QN AUTO: 31 PG (ref 27–33)
MCHC RBC AUTO-ENTMCNC: 35.3 G/DL (ref 32.3–36.5)
MCV RBC AUTO: 87.8 FL (ref 81.4–97.8)
MONOCYTES # BLD AUTO: 0.38 K/UL (ref 0–0.85)
MONOCYTES NFR BLD AUTO: 3.2 % (ref 0–13.4)
NEUTROPHILS # BLD AUTO: 10.55 K/UL (ref 1.82–7.42)
NEUTROPHILS NFR BLD: 88.9 % (ref 44–72)
NRBC # BLD AUTO: 0 K/UL
NRBC BLD-RTO: 0 /100 WBC (ref 0–0.2)
PATHOLOGY CONSULT NOTE: NORMAL
PHOSPHATE SERPL-MCNC: 1.8 MG/DL (ref 2.5–4.5)
PLATELET # BLD AUTO: 177 K/UL (ref 164–446)
PMV BLD AUTO: 10.4 FL (ref 9–12.9)
POTASSIUM SERPL-SCNC: 4.2 MMOL/L (ref 3.6–5.5)
RBC # BLD AUTO: 3.68 M/UL (ref 4.7–6.1)
SODIUM SERPL-SCNC: 140 MMOL/L (ref 135–145)
WBC # BLD AUTO: 11.9 K/UL (ref 4.8–10.8)

## 2023-12-11 PROCEDURE — 99024 POSTOP FOLLOW-UP VISIT: CPT | Performed by: REGISTERED NURSE

## 2023-12-11 PROCEDURE — 700102 HCHG RX REV CODE 250 W/ 637 OVERRIDE(OP): Performed by: REGISTERED NURSE

## 2023-12-11 PROCEDURE — 302098 PASTE RING (FLAT): Performed by: SURGERY

## 2023-12-11 PROCEDURE — 94669 MECHANICAL CHEST WALL OSCILL: CPT

## 2023-12-11 PROCEDURE — 84100 ASSAY OF PHOSPHORUS: CPT

## 2023-12-11 PROCEDURE — 700105 HCHG RX REV CODE 258: Performed by: REGISTERED NURSE

## 2023-12-11 PROCEDURE — 700111 HCHG RX REV CODE 636 W/ 250 OVERRIDE (IP): Mod: JZ | Performed by: REGISTERED NURSE

## 2023-12-11 PROCEDURE — 700105 HCHG RX REV CODE 258: Performed by: SURGERY

## 2023-12-11 PROCEDURE — 80048 BASIC METABOLIC PNL TOTAL CA: CPT

## 2023-12-11 PROCEDURE — 36415 COLL VENOUS BLD VENIPUNCTURE: CPT

## 2023-12-11 PROCEDURE — 700101 HCHG RX REV CODE 250: Performed by: REGISTERED NURSE

## 2023-12-11 PROCEDURE — 700111 HCHG RX REV CODE 636 W/ 250 OVERRIDE (IP): Mod: JZ | Performed by: SURGERY

## 2023-12-11 PROCEDURE — A9270 NON-COVERED ITEM OR SERVICE: HCPCS | Performed by: REGISTERED NURSE

## 2023-12-11 PROCEDURE — 770001 HCHG ROOM/CARE - MED/SURG/GYN PRIV*

## 2023-12-11 PROCEDURE — 83735 ASSAY OF MAGNESIUM: CPT

## 2023-12-11 PROCEDURE — 85025 COMPLETE CBC W/AUTO DIFF WBC: CPT

## 2023-12-11 PROCEDURE — 97606 NEG PRS WND THER DME>50 SQCM: CPT

## 2023-12-11 PROCEDURE — 700102 HCHG RX REV CODE 250 W/ 637 OVERRIDE(OP)

## 2023-12-11 PROCEDURE — A9270 NON-COVERED ITEM OR SERVICE: HCPCS

## 2023-12-11 PROCEDURE — 700111 HCHG RX REV CODE 636 W/ 250 OVERRIDE (IP): Mod: JZ

## 2023-12-11 PROCEDURE — 700101 HCHG RX REV CODE 250: Performed by: SURGERY

## 2023-12-11 PROCEDURE — 97602 WOUND(S) CARE NON-SELECTIVE: CPT

## 2023-12-11 RX ORDER — LIDOCAINE HYDROCHLORIDE 40 MG/ML
SOLUTION TOPICAL
Status: DISCONTINUED | OUTPATIENT
Start: 2023-12-11 | End: 2023-12-15 | Stop reason: HOSPADM

## 2023-12-11 RX ORDER — LIDOCAINE HYDROCHLORIDE 20 MG/ML
1 JELLY TOPICAL
Status: DISCONTINUED | OUTPATIENT
Start: 2023-12-11 | End: 2023-12-15 | Stop reason: HOSPADM

## 2023-12-11 RX ORDER — LIDOCAINE HYDROCHLORIDE 20 MG/ML
20 INJECTION, SOLUTION INFILTRATION; PERINEURAL
Status: DISCONTINUED | OUTPATIENT
Start: 2023-12-11 | End: 2023-12-15 | Stop reason: HOSPADM

## 2023-12-11 RX ADMIN — ONDANSETRON 4 MG: 2 INJECTION INTRAMUSCULAR; INTRAVENOUS at 11:27

## 2023-12-11 RX ADMIN — ACETAMINOPHEN 1000 MG: 500 TABLET, FILM COATED ORAL at 17:46

## 2023-12-11 RX ADMIN — CELECOXIB 200 MG: 200 CAPSULE ORAL at 17:46

## 2023-12-11 RX ADMIN — ACETAMINOPHEN 1000 MG: 500 TABLET, FILM COATED ORAL at 11:27

## 2023-12-11 RX ADMIN — OXYCODONE HYDROCHLORIDE 10 MG: 10 TABLET ORAL at 10:03

## 2023-12-11 RX ADMIN — FAMOTIDINE 20 MG: 20 TABLET ORAL at 17:46

## 2023-12-11 RX ADMIN — PIPERACILLIN AND TAZOBACTAM 4.5 G: 4; .5 INJECTION, POWDER, FOR SOLUTION INTRAVENOUS at 05:35

## 2023-12-11 RX ADMIN — LIDOCAINE HYDROCHLORIDE 1 APPLICATION: 40 SOLUTION ORAL at 09:45

## 2023-12-11 RX ADMIN — SODIUM CHLORIDE, POTASSIUM CHLORIDE, SODIUM LACTATE AND CALCIUM CHLORIDE: 600; 310; 30; 20 INJECTION, SOLUTION INTRAVENOUS at 05:32

## 2023-12-11 RX ADMIN — GLYCERIN 2.3 ML: 2.8 LIQUID RECTAL at 05:36

## 2023-12-11 RX ADMIN — PIPERACILLIN AND TAZOBACTAM 3.38 G: 3; .375 INJECTION, POWDER, FOR SOLUTION INTRAVENOUS at 14:54

## 2023-12-11 RX ADMIN — PIPERACILLIN AND TAZOBACTAM 3.38 G: 3; .375 INJECTION, POWDER, FOR SOLUTION INTRAVENOUS at 21:46

## 2023-12-11 RX ADMIN — ACETAMINOPHEN 1000 MG: 500 TABLET, FILM COATED ORAL at 00:08

## 2023-12-11 RX ADMIN — CELECOXIB 200 MG: 200 CAPSULE ORAL at 05:36

## 2023-12-11 RX ADMIN — LEVOTHYROXINE SODIUM 150 MCG: 0.15 TABLET ORAL at 19:31

## 2023-12-11 RX ADMIN — ACETAMINOPHEN 1000 MG: 500 TABLET, FILM COATED ORAL at 05:35

## 2023-12-11 RX ADMIN — FAMOTIDINE 20 MG: 20 TABLET ORAL at 05:36

## 2023-12-11 RX ADMIN — DOCUSATE SODIUM 100 MG: 100 CAPSULE, LIQUID FILLED ORAL at 17:46

## 2023-12-11 RX ADMIN — SODIUM PHOSPHATE, MONOBASIC, MONOHYDRATE AND SODIUM PHOSPHATE, DIBASIC, ANHYDROUS 30 MMOL: 276; 142 INJECTION, SOLUTION INTRAVENOUS at 08:34

## 2023-12-11 RX ADMIN — DOCUSATE SODIUM 100 MG: 100 CAPSULE, LIQUID FILLED ORAL at 05:36

## 2023-12-11 RX ADMIN — ENOXAPARIN SODIUM 40 MG: 100 INJECTION SUBCUTANEOUS at 17:46

## 2023-12-11 ASSESSMENT — PAIN DESCRIPTION - PAIN TYPE
TYPE: ACUTE PAIN

## 2023-12-11 ASSESSMENT — ENCOUNTER SYMPTOMS
ABDOMINAL PAIN: 1
VOMITING: 0
NAUSEA: 0

## 2023-12-11 NOTE — PROGRESS NOTES
"    DATE: 12/11/2023    Post Operative Day  3 exploratory laparotomy and bowel resection.    INTERVAL EVENTS:  Doing well, pain well managed.  Scant stool noted in ostomy pouch.  Had BM per rectum yesterday.   Tolerating NG clamped.  DC NG, Clear liquid diet.     REVIEW OF SYSTEMS:  Review of Systems   Gastrointestinal:  Positive for abdominal pain. Negative for nausea and vomiting.       PHYSICAL EXAMINATION:  Vital Signs: /68   Pulse 82   Temp 37.2 °C (99 °F) (Temporal)   Resp 18   Ht 1.829 m (6' 0.01\")   Wt 99.8 kg (220 lb)   SpO2 94%   Physical Exam  Vitals and nursing note reviewed.   Constitutional:       General: He is not in acute distress.     Appearance: Normal appearance.   Cardiovascular:      Rate and Rhythm: Normal rate and regular rhythm.   Pulmonary:      Effort: Pulmonary effort is normal. No respiratory distress.   Abdominal:      General: Abdomen is flat. Bowel sounds are normal.      Palpations: Abdomen is soft.      Comments: Abdomen soft, flat, tender. ML wound vac functioning. Ostomy is viable, no flatus or stool noted. S/s drainage in ostomy pouch with scant stool.    Musculoskeletal:         General: Normal range of motion.   Skin:     General: Skin is warm and dry.      Capillary Refill: Capillary refill takes less than 2 seconds.   Neurological:      General: No focal deficit present.      Mental Status: He is alert and oriented to person, place, and time. Mental status is at baseline.   Psychiatric:         Mood and Affect: Mood normal.         LABORATORY VALUES:   Recent Labs     12/09/23  0348 12/10/23  0418 12/11/23  0317   WBC 12.9* 14.2* 11.9*   RBC 5.31 4.42* 3.68*   HEMOGLOBIN 16.0 13.4* 11.4*   HEMATOCRIT 45.9 38.3* 32.3*   MCV 86.4 86.7 87.8   MCH 30.1 30.3 31.0   MCHC 34.9 35.0 35.3   RDW 41.2 41.7 42.7   PLATELETCT 274 209 177   MPV 10.1 10.2 10.4     Recent Labs     12/09/23 0348 12/10/23  0418 12/11/23 0317   SODIUM 141 139 140   POTASSIUM 4.9 4.0 4.2   CHLORIDE " 103 104 106   CO2 25 27 28   GLUCOSE 137* 102* 90   BUN 21 22 18   CREATININE 1.42* 1.34 1.36   CALCIUM 8.4* 8.7 8.3*     Recent Labs     12/08/23  1500   ASTSGOT 48*   ALTSGPT 46   TBILIRUBIN 1.1   ALKPHOSPHAT 107*   GLOBULIN 3.0            IMAGING:   CT-ABDOMEN-PELVIS WITH   Final Result         1. Moderate free intraperitoneal air, concerning for perforated viscus. There is free fluid surrounding the liver and in bilateral lower quadrant as well. The exact location of the perforation is unknown.      2. Significantly dilated rectum with large amount of stool and decompress sigmoid colon. This could relate to stercoral colitis and could be a potential area for perforation.      3. A 3.7 cm right adrenal nodule with peripheral calcified wall, nonspecific but could be a pseudocyst or chronic hematoma.         CRITICAL RESULT READ BACK: Preliminary findings discussed with and critical read back performed by Dr. CELINA PENG in the Emergency Department via telephone on 12/8/2023 5:12 PM          Medications reviewed, Radiology images reviewed and Labs reviewed  Adhikari catheter: No Adhikari      DVT Prophylaxis: Enoxaparin (Lovenox)    Ulcer prophylaxis: Yes  Antibiotics: Treating active infection/contamination beyond 24 hours perioperative coverage  Assessed for rehab: Patient returned to prior level of function, rehabilitation not indicated at this time      ASSESSMENT AND PLAN:   Perforation of sigmoid colon (HCC)- (present on admission)  Assessment & Plan  Acute perforated sigmoid colon with feculent peritonitis.  12/8 Exploratory laparotomy, sigmoid colon resection and end colostomy creation.    12/10 Zosyn day 2 of 4 following definitive source control.  Intraoperative peritoneal cultures + E.coli.  -Pathology pending.  12/11 Tolerating NG clamped with clear liquids.   -DC NG tube, advance to full liquid. Scant stool noted in ostomy pouch.   ACS Blue.    Elevated serum creatinine  Assessment & Plan  12/10  Improving  Gentle fluid rehydration.  12/11 Resolved.     Hypothyroid- (present on admission)  Assessment & Plan  Chronic condition treated with levothyroxine.  Resumed maintenance medication on admission.        Discussed patient condition with RN, Patient, and general surgery .

## 2023-12-11 NOTE — WOUND TEAM
[facility]  Inpatient Services  Initial Wound and Skin Care Evaluation    Admission Date: 12/8/2023     Last order of IP CONSULT TO WOUND CARE was found on 12/9/2023 from Hospital Encounter on 12/8/2023     HPI, PMH, SH: Reviewed    Past Surgical History:   Procedure Laterality Date    DE LAP,DIAGNOSTIC ABDOMEN  12/8/2023    Procedure: SIGMOID COLECTOMY WITH COLOSTOMY CREATION;  Surgeon: Ariel Caal M.D.;  Location: SURGERY [facility];  Service: General    TONSILLECTOMY  2/6/2015    Performed by Fernando Fair M.D. at SURGERY SAME DAY [facility] ORS    LARYNGOSCOPY  2/6/2015    Performed by Fernando Fair M.D. at SURGERY SAME DAY [facility] ORS    LYMPH NODE EXCISION  1/30/2015    [facility]     Social History     Tobacco Use    Smoking status: Former     Current packs/day: 0.00     Average packs/day: 1 pack/day for 15.0 years (15.0 ttl pk-yrs)     Types: Cigarettes     Start date: 1/1/1993     Quit date: 1/1/2008     Years since quitting: 15.9    Smokeless tobacco: Never   Substance Use Topics    Alcohol use: No     Chief Complaint   Patient presents with    Abdominal Pain     9/10 generalized abdominal pain onset this am at home. No relief with 100 mcg Fentanyl with EMS PTA. Denies abdominal history. No BM x 1.5 days, urination decreased from normal amount     Diagnosis: Perforated viscus [R19.8]    Unit where seen by Wound Team: T434/02     WOUND CONSULT RELATED TO:  Mid Abdomen    WOUND TEAM PLAN OF CARE - Frequency of Follow-up:   Nursing to follow dressing orders written for wound care. Contact wound team if area fails to progress, deteriorates or with any questions/concerns if something comes up before next scheduled follow up (See below as to whether wound is following and frequency of wound follow up)   NPWT change 3 times weekly - Mid Abdomen    WOUND HISTORY:   Pt is a 48yr old male admitted from ER with acute abdominal  pain primarily in the lower abdomen. Pt has history of metastatic squamous cell carcinoma of the right neck. CT scan of abdomen revealed peritonitis and free air. Pt was taken to OR by Dr. Caal for sigmoid colon resection with end colostomy and application of NPWT to the midline abdomen. Wound team was subsequently consulted to manage NPWT.       WOUND ASSESSMENT/LDA  Wound 12/08/23 Full Thickness Wound Open Incision Abdomen (Active)   Date First Assessed/Time First Assessed: 12/08/23 2019   Primary Wound Type: Full Thickness Wound  Surgical Wound Type: Open Incision  Location: Abdomen      Assessments 12/11/2023 12:00 PM   Wound Image      Site Assessment Red;Early/partial granulation   Periwound Assessment Clean;Dry;Intact   Margins Attached edges;Defined edges   Closure Secondary intention   Drainage Amount Small   Drainage Description Serosanguineous   Treatments Cleansed;Nonselective debridement;Site care;Topical Lidocaine   Wound Cleansing Approved Wound Cleanser   Periwound Protectant No-sting Skin Prep;Drape   Dressing Status Clean;Dry;Intact   Dressing Changed Changed   Dressing Cleansing/Solutions Not Applicable   Dressing Options Wound Vac   Dressing Change/Treatment Frequency Monday, Wednesday, Friday, and As Needed   NEXT Dressing Change/Treatment Date 12/13/23   NEXT Weekly Photo (Inpatient Only) 12/18/23   Wound Team Following 3x Weekly   Non-staged Wound Description Full thickness   Wound Length (cm) 23.5 cm   Wound Width (cm) 3.4 cm   Wound Depth (cm) 3.5 cm   Wound Surface Area (cm^2) 79.9 cm^2   Wound Volume (cm^3) 279.65 cm^3   Shape Linear   Wound Odor None   WOUND NURSE ONLY - Time Spent with Patient (mins) 60       Negative Pressure Wound Therapy 12/08/23 Abdomen (Active)   Placement Date/Time: 12/08/23 1041   Location: Abdomen      Assessments 12/11/2023 12:00 PM   Wound Image     Vacuum Serial Number [id number]   NPWT Pump Mode / Pressure Setting Ulta;Continuous;125 mmHg   Dressing Type  Medium;Black Foam (Regular)   Number of Foam Pieces Used 4   Canister Changed No   NEXT Dressing Change/Treatment Date 12/13/23        Vascular:    DONG:   No results found.    Lab Values:    Lab Results   Component Value Date/Time    WBC 11.9 (H) 12/11/2023 03:17 AM    RBC 3.68 (L) 12/11/2023 03:17 AM    HEMOGLOBIN 11.4 (L) 12/11/2023 03:17 AM    HEMATOCRIT 32.3 (L) 12/11/2023 03:17 AM         Culture Results show:  Recent Results (from the past 720 hour(s))   CULTURE WOUND W/ GRAM STAIN    Collection Time: 12/08/23  7:19 PM    Specimen: Wound   Result Value Ref Range    Significant Indicator POS (POS)     Source WND     Site Peritoneal Fluid     Culture Result - (A)     Gram Stain Result       Many WBCs.  Few Gram negative rods.  Few Gram positive rods.      Culture Result Escherichia coli  Rare growth   (A)        Susceptibility    Escherichia coli - PETEY     Ampicillin >16 Resistant mcg/mL     Amoxicillin/CA <=8/4 Sensitive mcg/mL     Ceftriaxone <=1 Sensitive mcg/mL     Cefazolin <=2 Sensitive mcg/mL     Ciprofloxacin 0.5 Intermediate mcg/mL     Cefepime <=2 Sensitive mcg/mL     Cefuroxime <=4 Sensitive mcg/mL     Ampicillin/sulbactam 16/8 Intermediate mcg/mL     Ertapenem <=0.5 Sensitive mcg/mL     Tobramycin <=2 Sensitive mcg/mL     Gentamicin <=2 Sensitive mcg/mL     Levofloxacin <=0.5 Sensitive mcg/mL     Minocycline <=4 Sensitive mcg/mL     Moxifloxacin <=2 Sensitive mcg/mL     Pip/Tazobactam <=8 Sensitive mcg/mL     Trimeth/Sulfa <=0.5/9.5 Sensitive mcg/mL     Tigecycline <=2 Sensitive mcg/mL       Pain Level/Medicated:  4% Lidocaine allowed to dwell on wound bed 2hrs prior and PO pain medications administered by bedside RN 90min prior       INTERVENTIONS BY WOUND TEAM:  Chart and images reviewed. Discussed with bedside RN. All areas of concern (based on picture review, LDA review and discussion with bedside RN) have been thoroughly assessed. Documentation of areas based on significant findings. This RN in  to assess patient. Performed standard wound care which includes appropriate positioning, dressing removal and non-selective debridement. Pictures and measurements obtained weekly if/when required.    Wound:  Mid Abdomen  Preparation for Dressing removal: Dressing soaked with Lidocaine  Cleansed/Non-selectively Debrided with:  Wound cleanser and Gauze  Danielle wound: Cleansed with Wound cleanser and Gauze, Prepped with No Sting and Drape  Primary Dressing:  One piece of spiraled full thickness black regular foam was applied into the wound 2 more spiraled full thickness black foam pieces were applied to fill wound depth. Foam was secured with drape.  Secondary (Outer) Dressing: a hole was cut in drape and a 4th and final piece of half thickness circular black foam was applied as a button for trac pad. Trac pad applied and suction resumed. No leaks noted.     Advanced Wound Care Discharge Planning  Number of Clinicians necessary to complete wound care: 1  Is patient requiring IV pain medications for dressing changes:  No   Length of time for dressing change 30 min. (This does not include chart review, pre-medication time, set up, clean up or time spent charting.)    Interdisciplinary consultation: Patient, Bedside RN (Terell), Cristina MEJIA (Wound RN).  Pressure injury and staging reviewed with N/A.    EVALUATION / RATIONALE FOR TREATMENT:     Date:  12/11/23  Wound Status:  Initial evaluation    Wound is clean and will do well with continued NPWT.         Goals: Steady decrease in wound area and depth weekly.    NURSING PLAN OF CARE ORDERS:  No new orders this visit    NUTRITION RECOMMENDATIONS   Wound Team Recommendations:  N/A    DIET ORDERS (From admission to next 24h)       Start     Ordered    12/11/23 1050  Diet Order Diet: Clear Liquid  ALL MEALS        Question:  Diet:  Answer:  Clear Liquid    12/11/23 1050                    PREVENTATIVE INTERVENTIONS:    Q shift Joaquin - performed per nursing policy  Q shift  pressure point assessments - performed per nursing policy    Surface/Positioning  Standard/trauma mattress - Currently in Place    Offloading/Redistribution  Sacral offloading dressing (Silicone dressing) - Ordered  Heel offloading dressing (Silicone dressing) - Ordered      Containment/Moisture Prevention    Fecal ostomy - Currently in Place    Mobilization      Up to chair     Anticipated discharge plans:  Home Health Care and Outpatient Wound Center        Vac Discharge Needs:  Vac Discharge plan is purely a recommendation from wound team and not a requirement for discharge unless otherwise stated by physician.  Regular Vac in use and continued at discharge

## 2023-12-11 NOTE — CARE PLAN
The patient is Stable - Low risk of patient condition declining or worsening    Shift Goals  Clinical Goals: pain control, NG clamped  Patient Goals: rest  Family Goals: n/a    Progress made toward(s) clinical / shift goals:  Pt medicated per MAR, resting. NG tube clamped. Suppository resulted in bowel movement via rectum. ABX in place. Updated pt on POC.     Patient is not progressing towards the following goals:

## 2023-12-11 NOTE — PROGRESS NOTES
Assumed care of pt at 0755. Report received and bedside rounding completed with night RN. Pt is calm no SOB, or in any acute distress noted.     Fall precautions in place,  bed alarm. - Treaded non slip socks. Bed locked. Communication board updated with POC. Call light and pt belongings within reach - hourly rounding in place. Surgery-Trauma team rounding.     1100: D/C the NG tube and the LR fluids per order. Patient tolerated the ng tube d/c well. Continuing to run the Sodium Phosphate 30 mmol in D5 500 ml IVPB. Patient pain is controlled.

## 2023-12-11 NOTE — PROGRESS NOTES
Bedside report received from day shift nurse. Assumed care at 1845.   Pt A&Ox4  Tolerating Npo sips diet, denies n/v. + bowel sounds, - flatus, only serosanguinous output through ostomy.  Saturating >90% on RA. Denies SOB. R nare NGT, clamped  Pt ambulates x1 assist. SCDs on.  Ostomy to Left, MLI with wound vac, R RAIMUNDO drain. X2 small incisions with staples.    Pain is controlled through medication orders. Updated on plan of care. Safety education provided. Bed locked in low. Call light within reach. Rounding in place.

## 2023-12-11 NOTE — WOUND TEAM
" [facility]  Inpatient Services  New Ostomy Follow-up Management & Teaching      Ostomy Nurse Plan of Care - Frequency of Follow-up:   Ostomy nurses to continue to follow for ostomy needs and teaching until patient independent with care or discharge.  Ostomy Nurse follow-up frequency:  With wound vac changes       Past Surgical History:   Procedure Laterality Date    TONSILLECTOMY  2015    Performed by Fernando Fair M.D. at SURGERY SAME DAY [facility] ORS    LARYNGOSCOPY  2015    Performed by Fernando Fair M.D. at SURGERY SAME DAY [facility] ORS    LYMPH NODE EXCISION  2015    Performed by Fernando Fair M.D. at SURGERY SAME DAY [facility]       Surgery Date: 23    Surgeon(s):  Ariel Caal M.D.    Procedure(s):  LAPAROSCOPY     Permanence: temporary     Pertinent History:  48 y.o. male admitted for perforated sigmoid colon and feculent peritonitis resulting in end colostomy creation by Dr. Caal on .  [facility] metastatic squamous cell carcinoma of R neck.                       Colostomy 23 Sigmoid LLQ (Active)   Wound Image    23 1200   Stomal Appliance Assessment Clean;Dry;Intact    Stoma Assessment Beefy red    Stoma Shape Budded Less Than One Inch;Round    Stoma Size (in) 1.5    Peristomal Assessment Clean;Dry;Intact    Mucocutaneous Junction Intact    Treatment Appliance Changed;Cleansed with water/washcloth;Site care    Peristomal Protectant Paste Ring    Stomal Appliance Paste Ring, 2\";2 3/4\" (70mm) CTF    Output (mL) 0 mL    Output Color Bloody    WOUND RN ONLY - Stomal Appliance  2 Piece;2 3/4\" (70mm) CTF;Paste Ring, 2\"    Appliance (Pouch) # Pouch: [id number], Barrier: [id number], Paste Rin    Appliance Brand Mode    Secure Start completed Yes    WOUND NURSE ONLY - Time Spent with Patient (mins) 45      Colostomy Interventions:  Removed appliance (using push pull method) - By Ostomy RN  Cleansed dewey-stomal skin with moist warm washcloth - By " "Ostomy RN  Created/Checked template fit - By Ostomy RN  Traced Shape to back of barrier - By Ostomy RN  Cut barrier to stoma size - By Ostomy RN  Confirmed fit - By Ostomy RN  Removed plastic backing and \"Dog Eared\" paper edges - By Ostomy RN  Stretched paste ring to fit barrier opening - By Ostomy RN  Applied paste ring to back of barrier - By Ostomy RN  Applied barrier to skin and adhered with friction - By Ostomy RN  Attached pouch - By Ostomy RN  Closed Pouch end - By Ostomy RN    Patient Education:   All questions answered, procedure explained, previous education reinforced    Ostomy RN to follow-up daily for education     Date:  12/11/23    Reinforced education provided during last visit  Educated regarding the following things: stoma size/shape. Stoma will likely change sizes in the first 4 to 6 weeks. Currently using the most basic supplies while in the hospital, there are many brands and options in regards to supplies.  Patient practiced emptying pouch with bedside RN/CNA  Date:  12/09/23  Folder/paperwork delivered  Educated regarding the following things: stoma size/shape. Stoma will likely change sizes in the first 4 to 6 weeks. Currently using the most basic supplies while in the hospital, there are many brands and options in regards to supplies.  Educated on when to empty and how to empty, burping appliance, Wear time, Diet (chewing food well) and hydration, Medications, activity including showering and swimming. Pt aware that they should keep an extra set of appliances with them at all times (do not leave in warm cars).  Booklet inside of folder went over, but not other pamphlets in folder      Needs for next visit: HANDS ON, pt has 8 sets of 2 3/4\" in room    Evaluation:      Date:  12/11/23    Patient observed all steps and verbalized understanding.  Patient started having output today and agreed to start practicing emptying pouch with RN/CNA.    Date:  12/09/23    Patient observing appliance " "change. Briefer education session provided today as patient is just recently out of surgery.  Additional 2 3/4\" two pieces appliances ordered for next change.        Flatus: Present  Stool Output: small, brown, and liquid  Urine Output: NA, Fecal Ostomy  Mobility: Ambulating at Baseline    DIET ORDERS (From admission to next 24h)       Start     Ordered    12/11/23 1050  Diet Order Diet: Clear Liquid  ALL MEALS        Question:  Diet:  Answer:  Clear Liquid    12/11/23 1050                     Secure Start Signed:  Completed by Wound team Tech  Outpatient Referral Placed:  Yes       5 Sets of appliances in Ostomy bag for discharge:  Yes    INSURANCE OPTIONS:  If patient has [facility]Senior care plus patient will need an [employer] book. If patientt has Medicaid be sure patient has care chest paperwork.    Currently Active Insurance       Payor Plan    [employer] [facility]            Anticipated Discharge Plans:  Self/Family Care    Ostomy Supplies for DC:  To be determined in 4 to 6 weeks once stomal edema has fully resolved  "

## 2023-12-11 NOTE — CARE PLAN
The patient is Stable - Low risk of patient condition declining or worsening    Shift Goals  Clinical Goals: pain control, NG clamped  Patient Goals: rest  Family Goals: n/a    Progress made toward(s) clinical / shift goals:  Pain controlled, Surgery assessing ostomy site daily, Last bowel movement 12/11    Patient is not progressing towards the following goals:      Problem: Skin Care - Ostomy  Goal: Skin remains free from irritation  Outcome: Progressing  Note: Monitoring output from ostomy, surgery team following      Problem: Pain - Standard  Goal: Alleviation of pain or a reduction in pain to the patient’s comfort goal  Outcome: Progressing  Note: Educated about the pain scale and non pharmacological pain methods, check the MAR

## 2023-12-11 NOTE — CARE PLAN
Problem: Hyperinflation  Goal: Prevent or improve atelectasis  Description: Target End Date:  3 to 4 days    1. Instruct incentive spirometry usage  2.  Perform hyperinflation therapy as indicated  Outcome: Met   PEP QID 2000ml IS

## 2023-12-12 PROBLEM — R79.89 ELEVATED SERUM CREATININE: Status: RESOLVED | Noted: 2023-12-09 | Resolved: 2023-12-12

## 2023-12-12 LAB
ANION GAP SERPL CALC-SCNC: 11 MMOL/L (ref 7–16)
BACTERIA SPEC ANAEROBE CULT: ABNORMAL
BASOPHILS # BLD AUTO: 0.2 % (ref 0–1.8)
BASOPHILS # BLD: 0.02 K/UL (ref 0–0.12)
BUN SERPL-MCNC: 16 MG/DL (ref 8–22)
CALCIUM SERPL-MCNC: 8.1 MG/DL (ref 8.5–10.5)
CHLORIDE SERPL-SCNC: 107 MMOL/L (ref 96–112)
CO2 SERPL-SCNC: 26 MMOL/L (ref 20–33)
CREAT SERPL-MCNC: 1.22 MG/DL (ref 0.5–1.4)
EOSINOPHIL # BLD AUTO: 0.17 K/UL (ref 0–0.51)
EOSINOPHIL NFR BLD: 1.8 % (ref 0–6.9)
ERYTHROCYTE [DISTWIDTH] IN BLOOD BY AUTOMATED COUNT: 42.2 FL (ref 35.9–50)
GFR SERPLBLD CREATININE-BSD FMLA CKD-EPI: 73 ML/MIN/1.73 M 2
GLUCOSE SERPL-MCNC: 90 MG/DL (ref 65–99)
HCT VFR BLD AUTO: 31.9 % (ref 42–52)
HGB BLD-MCNC: 11 G/DL (ref 14–18)
IMM GRANULOCYTES # BLD AUTO: 0.03 K/UL (ref 0–0.11)
IMM GRANULOCYTES NFR BLD AUTO: 0.3 % (ref 0–0.9)
LYMPHOCYTES # BLD AUTO: 0.61 K/UL (ref 1–4.8)
LYMPHOCYTES NFR BLD: 6.4 % (ref 22–41)
MCH RBC QN AUTO: 30.2 PG (ref 27–33)
MCHC RBC AUTO-ENTMCNC: 34.5 G/DL (ref 32.3–36.5)
MCV RBC AUTO: 87.6 FL (ref 81.4–97.8)
MONOCYTES # BLD AUTO: 0.59 K/UL (ref 0–0.85)
MONOCYTES NFR BLD AUTO: 6.2 % (ref 0–13.4)
NEUTROPHILS # BLD AUTO: 8.15 K/UL (ref 1.82–7.42)
NEUTROPHILS NFR BLD: 85.1 % (ref 44–72)
NRBC # BLD AUTO: 0 K/UL
NRBC BLD-RTO: 0 /100 WBC (ref 0–0.2)
PLATELET # BLD AUTO: 181 K/UL (ref 164–446)
PMV BLD AUTO: 10.3 FL (ref 9–12.9)
POTASSIUM SERPL-SCNC: 3.9 MMOL/L (ref 3.6–5.5)
RBC # BLD AUTO: 3.64 M/UL (ref 4.7–6.1)
SIGNIFICANT IND 70042: ABNORMAL
SITE SITE: ABNORMAL
SODIUM SERPL-SCNC: 144 MMOL/L (ref 135–145)
SOURCE SOURCE: ABNORMAL
WBC # BLD AUTO: 9.6 K/UL (ref 4.8–10.8)

## 2023-12-12 PROCEDURE — 94669 MECHANICAL CHEST WALL OSCILL: CPT

## 2023-12-12 PROCEDURE — 700105 HCHG RX REV CODE 258: Performed by: REGISTERED NURSE

## 2023-12-12 PROCEDURE — 99024 POSTOP FOLLOW-UP VISIT: CPT | Performed by: REGISTERED NURSE

## 2023-12-12 PROCEDURE — 770001 HCHG ROOM/CARE - MED/SURG/GYN PRIV*

## 2023-12-12 PROCEDURE — 700102 HCHG RX REV CODE 250 W/ 637 OVERRIDE(OP)

## 2023-12-12 PROCEDURE — A9270 NON-COVERED ITEM OR SERVICE: HCPCS

## 2023-12-12 PROCEDURE — 36415 COLL VENOUS BLD VENIPUNCTURE: CPT

## 2023-12-12 PROCEDURE — 85025 COMPLETE CBC W/AUTO DIFF WBC: CPT

## 2023-12-12 PROCEDURE — 80048 BASIC METABOLIC PNL TOTAL CA: CPT

## 2023-12-12 PROCEDURE — 700111 HCHG RX REV CODE 636 W/ 250 OVERRIDE (IP)

## 2023-12-12 PROCEDURE — 700111 HCHG RX REV CODE 636 W/ 250 OVERRIDE (IP): Performed by: REGISTERED NURSE

## 2023-12-12 RX ORDER — METRONIDAZOLE 500 MG/100ML
500 INJECTION, SOLUTION INTRAVENOUS EVERY 12 HOURS
Status: COMPLETED | OUTPATIENT
Start: 2023-12-12 | End: 2023-12-13

## 2023-12-12 RX ADMIN — ENOXAPARIN SODIUM 40 MG: 100 INJECTION SUBCUTANEOUS at 17:11

## 2023-12-12 RX ADMIN — CELECOXIB 200 MG: 200 CAPSULE ORAL at 05:19

## 2023-12-12 RX ADMIN — CEFAZOLIN 2 G: 2 INJECTION, POWDER, FOR SOLUTION INTRAMUSCULAR; INTRAVENOUS at 20:00

## 2023-12-12 RX ADMIN — CEFAZOLIN 2 G: 2 INJECTION, POWDER, FOR SOLUTION INTRAMUSCULAR; INTRAVENOUS at 13:51

## 2023-12-12 RX ADMIN — DOCUSATE SODIUM 100 MG: 100 CAPSULE, LIQUID FILLED ORAL at 05:18

## 2023-12-12 RX ADMIN — FAMOTIDINE 20 MG: 20 TABLET ORAL at 05:19

## 2023-12-12 RX ADMIN — ACETAMINOPHEN 1000 MG: 500 TABLET, FILM COATED ORAL at 05:18

## 2023-12-12 RX ADMIN — ACETAMINOPHEN 1000 MG: 500 TABLET, FILM COATED ORAL at 00:16

## 2023-12-12 RX ADMIN — CELECOXIB 200 MG: 200 CAPSULE ORAL at 17:11

## 2023-12-12 RX ADMIN — ACETAMINOPHEN 1000 MG: 500 TABLET, FILM COATED ORAL at 17:11

## 2023-12-12 RX ADMIN — LEVOTHYROXINE SODIUM 150 MCG: 0.15 TABLET ORAL at 20:36

## 2023-12-12 RX ADMIN — PIPERACILLIN AND TAZOBACTAM 3.38 G: 3; .375 INJECTION, POWDER, FOR SOLUTION INTRAVENOUS at 05:18

## 2023-12-12 RX ADMIN — ONDANSETRON 4 MG: 4 TABLET, ORALLY DISINTEGRATING ORAL at 16:19

## 2023-12-12 RX ADMIN — METRONIDAZOLE 500 MG: 5 INJECTION, SOLUTION INTRAVENOUS at 11:17

## 2023-12-12 RX ADMIN — DOCUSATE SODIUM 100 MG: 100 CAPSULE, LIQUID FILLED ORAL at 17:11

## 2023-12-12 RX ADMIN — METRONIDAZOLE 500 MG: 5 INJECTION, SOLUTION INTRAVENOUS at 20:37

## 2023-12-12 RX ADMIN — ACETAMINOPHEN 1000 MG: 500 TABLET, FILM COATED ORAL at 11:15

## 2023-12-12 ASSESSMENT — PAIN DESCRIPTION - PAIN TYPE
TYPE: ACUTE PAIN

## 2023-12-12 ASSESSMENT — ENCOUNTER SYMPTOMS
VOMITING: 0
ABDOMINAL PAIN: 1
NAUSEA: 0

## 2023-12-12 NOTE — PROGRESS NOTES
Bedside report received.  Assessment complete.  A&O x 4. Patient calls appropriately.  Patient ambulates with one person assist.  Patient has 2/10 pain. Pain managed with prescribed medications.  Denies N&V. Tolerating diet.  Surgical ML wound vac is in place, CDI, no leaks noted at this time.  RAIMUNDO to RLQ, sutured in place and HARMONY.  + void, + flatus, + BM via Ostomy.  Patient denies SOB.  SCD's on.  Patient pleasant with staff and sitting up in bed.  Review plan with of care with patient. Call light and personal belongings within reach. Hourly rounding in place. All needs met at this time.

## 2023-12-12 NOTE — DISCHARGE PLANNING
Case Management Discharge Planning    Admission Date: 12/8/2023  GMLOS: 9.8  ALOS: 4    6-Clicks ADL Score: 20  6-Clicks Mobility Score: 15  PT and/or OT Eval ordered: No  Post-acute Referrals Ordered: NA  Post-acute Choice Obtained: NA  Has referral(s) been sent to post-acute provider:  LESLYE      Anticipated Discharge Dispo: Discharge Disposition: Discharged to home/self care (01)    DME Needed: Yes    DME Ordered: Yes; Wound vac order form faxed to Barbara at [facility] by [email or web address]    Action(s) Taken: Chart review completed. Patient discussed during IDT rounds.     0830: LINDA Vicente in to CM office to sign wound vac order form. RNCM faxed wound vac order form to Barbara at [facility].    0859: RNCM scheduled patient at [facility] for Monday, 12/18 at 1300; per [facility], this is the earliest the patient can be seen. LINDA Vicente notified.     Escalations Completed: None    Medically Clear: No    Next Steps: CM to continue to follow up with IDT regarding discharge planning needs/barriers. CM to follow up with wound vac delivery from [facility].     Barriers to Discharge: Medical clearance and DME    Is the patient up for discharge tomorrow: No

## 2023-12-12 NOTE — CARE PLAN
Problem: Hyperinflation  Goal: Prevent or improve atelectasis  Description: Target End Date:  3 to 4 days    1. Instruct incentive spirometry usage  2.  Perform hyperinflation therapy as indicated  Outcome: Progressing  Flowsheets (Taken 12/9/2023 0721 by Megan Josue, RRT)  Hyperinflation Protocol Goals/Outcome: Increase and/or stable inspiratory capacity for 24 hours  Hyperinflation Protocol Indications: Abdominal/Thoracic Surgeries (Open Heart)       Respiratory Update    Treatment modality: PEP  Frequency: BID  IS 2200    Pt tolerating current treatments well with no adverse reactions.

## 2023-12-12 NOTE — PROGRESS NOTES
"    DATE: 12/12/2023    Post Operative Day  4 exploratory laparotomy and bowel resection.    INTERVAL EVENTS:  Doing well, copious flatus and stool in ostomy pouch.  Pain well managed.  Pathology remains pending.     REVIEW OF SYSTEMS:  Review of Systems   Gastrointestinal:  Positive for abdominal pain. Negative for nausea and vomiting.       PHYSICAL EXAMINATION:  Vital Signs: /71   Pulse 74   Temp 37 °C (98.6 °F) (Temporal)   Resp 16   Ht 1.829 m (6' 0.01\")   Wt 99.8 kg (220 lb)   SpO2 93%   Physical Exam  Vitals and nursing note reviewed.   Constitutional:       General: He is not in acute distress.     Appearance: Normal appearance.   Cardiovascular:      Rate and Rhythm: Normal rate and regular rhythm.   Pulmonary:      Effort: Pulmonary effort is normal. No respiratory distress.   Abdominal:      General: Abdomen is flat. Bowel sounds are normal.      Palpations: Abdomen is soft.      Comments: Abdomen soft, flat, tender. ML wound vac functioning. Ostomy is viable, copious flatus and stool noted in bag.   Musculoskeletal:         General: Normal range of motion.   Skin:     General: Skin is warm and dry.      Capillary Refill: Capillary refill takes less than 2 seconds.   Neurological:      General: No focal deficit present.      Mental Status: He is alert and oriented to person, place, and time. Mental status is at baseline.   Psychiatric:         Mood and Affect: Mood normal.         LABORATORY VALUES:   Recent Labs     12/10/23  0418 12/11/23  0317 12/12/23  0339   WBC 14.2* 11.9* 9.6   RBC 4.42* 3.68* 3.64*   HEMOGLOBIN 13.4* 11.4* 11.0*   HEMATOCRIT 38.3* 32.3* 31.9*   MCV 86.7 87.8 87.6   MCH 30.3 31.0 30.2   MCHC 35.0 35.3 34.5   RDW 41.7 42.7 42.2   PLATELETCT 209 177 181   MPV 10.2 10.4 10.3     Recent Labs     12/10/23  0418 12/11/23  0317 12/12/23  0339   SODIUM 139 140 144   POTASSIUM 4.0 4.2 3.9   CHLORIDE 104 106 107   CO2 27 28 26   GLUCOSE 102* 90 90   BUN 22 18 16   CREATININE 1.34 " 1.36 1.22   CALCIUM 8.7 8.3* 8.1*                  IMAGING:   CT-ABDOMEN-PELVIS WITH   Final Result         1. Moderate free intraperitoneal air, concerning for perforated viscus. There is free fluid surrounding the liver and in bilateral lower quadrant as well. The exact location of the perforation is unknown.      2. Significantly dilated rectum with large amount of stool and decompress sigmoid colon. This could relate to stercoral colitis and could be a potential area for perforation.      3. A 3.7 cm right adrenal nodule with peripheral calcified wall, nonspecific but could be a pseudocyst or chronic hematoma.         CRITICAL RESULT READ BACK: Preliminary findings discussed with and critical read back performed by Dr. CELINA PENG in the Emergency Department via telephone on 12/8/2023 5:12 PM          Medications reviewed, Radiology images reviewed and Labs reviewed  Adhikari catheter: No Adhikari      DVT Prophylaxis: Enoxaparin (Lovenox)    Ulcer prophylaxis: Yes  Antibiotics: Treating active infection/contamination beyond 24 hours perioperative coverage  Assessed for rehab: Patient returned to prior level of function, rehabilitation not indicated at this time      ASSESSMENT AND PLAN:   Perforation of sigmoid colon (HCC)- (present on admission)  Assessment & Plan  Acute perforated sigmoid colon with feculent peritonitis.  12/8 Exploratory laparotomy, sigmoid colon resection and end colostomy creation.    12/11 DC NG tube, advance to full liquid.   12/12 Zosyn day 4 of 4 following definitive source control.  Intraoperative peritoneal cultures + E.coli, pan sensitive.   -Pathology pending.  -Tolerating fulls, advance to GI soft.   ACS Blue.    Hypothyroid- (present on admission)  Assessment & Plan  Chronic condition treated with levothyroxine.  Resumed maintenance medication on admission.        Discussed patient condition with RN, Patient, and general surgery .

## 2023-12-12 NOTE — DISCHARGE PLANNING
1430  Agency/Facility Name: [facility]  Spoke To: Barbara  Outcome: Per Barbara wound vac was approved should be delivered today.  LSW notified.

## 2023-12-12 NOTE — PROGRESS NOTES
Bedside report received from day shift nurse. Assumed care at 1845.   Pt A&Ox4  Tolerating clear diet, denies n/v. + bowel sounds, small output through ostomy.  Saturating >90% on RA. Denies SOB.  Pt ambulates x1 FWW. SCDs on.  MLI with wound vac, Right RAIMUNDO drain, Left ostomy. Staples to x2 incisions.    Pain is controlled through medication orders. Updated on plan of care. Safety education provided. Bed locked in low. Call light within reach. Rounding in place.

## 2023-12-12 NOTE — CARE PLAN
The patient is Stable - Low risk of patient condition declining or worsening    Shift Goals  Clinical Goals: ABX administration, ostomy care  Patient Goals: rest  Family Goals: n/a    Progress made toward(s) clinical / shift goals:  Pt medicated per MAR, resting. Ostomy with scant stool output. ABX in place. Updated pt on POC.     Patient is not progressing towards the following goals:

## 2023-12-13 ENCOUNTER — APPOINTMENT (OUTPATIENT)
Dept: RADIOLOGY | Facility: MEDICAL CENTER | Age: 48
DRG: 329 | End: 2023-12-13
Payer: COMMERCIAL

## 2023-12-13 LAB
ANION GAP SERPL CALC-SCNC: 9 MMOL/L (ref 7–16)
BASOPHILS # BLD AUTO: 0.3 % (ref 0–1.8)
BASOPHILS # BLD: 0.03 K/UL (ref 0–0.12)
BUN SERPL-MCNC: 18 MG/DL (ref 8–22)
CALCIUM SERPL-MCNC: 9 MG/DL (ref 8.5–10.5)
CHLORIDE SERPL-SCNC: 104 MMOL/L (ref 96–112)
CO2 SERPL-SCNC: 27 MMOL/L (ref 20–33)
CREAT SERPL-MCNC: 1.13 MG/DL (ref 0.5–1.4)
EOSINOPHIL # BLD AUTO: 0.21 K/UL (ref 0–0.51)
EOSINOPHIL NFR BLD: 2.3 % (ref 0–6.9)
ERYTHROCYTE [DISTWIDTH] IN BLOOD BY AUTOMATED COUNT: 41.8 FL (ref 35.9–50)
GFR SERPLBLD CREATININE-BSD FMLA CKD-EPI: 81 ML/MIN/1.73 M 2
GLUCOSE SERPL-MCNC: 110 MG/DL (ref 65–99)
HCT VFR BLD AUTO: 35.1 % (ref 42–52)
HGB BLD-MCNC: 12.8 G/DL (ref 14–18)
IMM GRANULOCYTES # BLD AUTO: 0.04 K/UL (ref 0–0.11)
IMM GRANULOCYTES NFR BLD AUTO: 0.4 % (ref 0–0.9)
LYMPHOCYTES # BLD AUTO: 0.75 K/UL (ref 1–4.8)
LYMPHOCYTES NFR BLD: 8.1 % (ref 22–41)
MCH RBC QN AUTO: 31.8 PG (ref 27–33)
MCHC RBC AUTO-ENTMCNC: 36.5 G/DL (ref 32.3–36.5)
MCV RBC AUTO: 87.1 FL (ref 81.4–97.8)
MONOCYTES # BLD AUTO: 0.76 K/UL (ref 0–0.85)
MONOCYTES NFR BLD AUTO: 8.2 % (ref 0–13.4)
NEUTROPHILS # BLD AUTO: 7.5 K/UL (ref 1.82–7.42)
NEUTROPHILS NFR BLD: 80.7 % (ref 44–72)
NRBC # BLD AUTO: 0 K/UL
NRBC BLD-RTO: 0 /100 WBC (ref 0–0.2)
PLATELET # BLD AUTO: 241 K/UL (ref 164–446)
PMV BLD AUTO: 10.2 FL (ref 9–12.9)
POTASSIUM SERPL-SCNC: 3.9 MMOL/L (ref 3.6–5.5)
RBC # BLD AUTO: 4.03 M/UL (ref 4.7–6.1)
SODIUM SERPL-SCNC: 140 MMOL/L (ref 135–145)
WBC # BLD AUTO: 9.3 K/UL (ref 4.8–10.8)

## 2023-12-13 PROCEDURE — 700102 HCHG RX REV CODE 250 W/ 637 OVERRIDE(OP)

## 2023-12-13 PROCEDURE — 770001 HCHG ROOM/CARE - MED/SURG/GYN PRIV*

## 2023-12-13 PROCEDURE — 74018 RADEX ABDOMEN 1 VIEW: CPT

## 2023-12-13 PROCEDURE — 85025 COMPLETE CBC W/AUTO DIFF WBC: CPT

## 2023-12-13 PROCEDURE — 99024 POSTOP FOLLOW-UP VISIT: CPT

## 2023-12-13 PROCEDURE — 700101 HCHG RX REV CODE 250: Performed by: SURGERY

## 2023-12-13 PROCEDURE — 97602 WOUND(S) CARE NON-SELECTIVE: CPT

## 2023-12-13 PROCEDURE — 80048 BASIC METABOLIC PNL TOTAL CA: CPT

## 2023-12-13 PROCEDURE — 700105 HCHG RX REV CODE 258: Performed by: REGISTERED NURSE

## 2023-12-13 PROCEDURE — A9270 NON-COVERED ITEM OR SERVICE: HCPCS

## 2023-12-13 PROCEDURE — 97606 NEG PRS WND THER DME>50 SQCM: CPT

## 2023-12-13 PROCEDURE — 700111 HCHG RX REV CODE 636 W/ 250 OVERRIDE (IP): Performed by: REGISTERED NURSE

## 2023-12-13 PROCEDURE — 700111 HCHG RX REV CODE 636 W/ 250 OVERRIDE (IP)

## 2023-12-13 PROCEDURE — 36415 COLL VENOUS BLD VENIPUNCTURE: CPT

## 2023-12-13 RX ORDER — SIMETHICONE 125 MG
125 TABLET,CHEWABLE ORAL 3 TIMES DAILY PRN
Status: DISCONTINUED | OUTPATIENT
Start: 2023-12-13 | End: 2023-12-15 | Stop reason: HOSPADM

## 2023-12-13 RX ADMIN — CEFAZOLIN 2 G: 2 INJECTION, POWDER, FOR SOLUTION INTRAMUSCULAR; INTRAVENOUS at 22:29

## 2023-12-13 RX ADMIN — CELECOXIB 200 MG: 200 CAPSULE ORAL at 04:18

## 2023-12-13 RX ADMIN — CEFAZOLIN 2 G: 2 INJECTION, POWDER, FOR SOLUTION INTRAMUSCULAR; INTRAVENOUS at 14:49

## 2023-12-13 RX ADMIN — ONDANSETRON 4 MG: 4 TABLET, ORALLY DISINTEGRATING ORAL at 17:48

## 2023-12-13 RX ADMIN — ONDANSETRON 4 MG: 4 TABLET, ORALLY DISINTEGRATING ORAL at 22:30

## 2023-12-13 RX ADMIN — ONDANSETRON 4 MG: 2 INJECTION INTRAMUSCULAR; INTRAVENOUS at 00:15

## 2023-12-13 RX ADMIN — CEFAZOLIN 2 G: 2 INJECTION, POWDER, FOR SOLUTION INTRAMUSCULAR; INTRAVENOUS at 04:19

## 2023-12-13 RX ADMIN — ACETAMINOPHEN 1000 MG: 500 TABLET, FILM COATED ORAL at 17:47

## 2023-12-13 RX ADMIN — CELECOXIB 200 MG: 200 CAPSULE ORAL at 17:48

## 2023-12-13 RX ADMIN — LIDOCAINE HYDROCHLORIDE 1 APPLICATION: 40 SOLUTION ORAL at 09:45

## 2023-12-13 RX ADMIN — ENOXAPARIN SODIUM 40 MG: 100 INJECTION SUBCUTANEOUS at 17:48

## 2023-12-13 RX ADMIN — ONDANSETRON 4 MG: 2 INJECTION INTRAMUSCULAR; INTRAVENOUS at 05:06

## 2023-12-13 RX ADMIN — METRONIDAZOLE 500 MG: 5 INJECTION, SOLUTION INTRAVENOUS at 10:29

## 2023-12-13 RX ADMIN — METRONIDAZOLE 500 MG: 5 INJECTION, SOLUTION INTRAVENOUS at 21:00

## 2023-12-13 RX ADMIN — DOCUSATE SODIUM 100 MG: 100 CAPSULE, LIQUID FILLED ORAL at 17:48

## 2023-12-13 RX ADMIN — ONDANSETRON 4 MG: 4 TABLET, ORALLY DISINTEGRATING ORAL at 11:27

## 2023-12-13 RX ADMIN — SIMETHICONE 125 MG: 125 TABLET, CHEWABLE ORAL at 01:37

## 2023-12-13 RX ADMIN — HYDROMORPHONE HYDROCHLORIDE 0.5 MG: 1 INJECTION, SOLUTION INTRAMUSCULAR; INTRAVENOUS; SUBCUTANEOUS at 05:06

## 2023-12-13 RX ADMIN — LEVOTHYROXINE SODIUM 150 MCG: 0.15 TABLET ORAL at 20:59

## 2023-12-13 ASSESSMENT — ENCOUNTER SYMPTOMS
ABDOMINAL PAIN: 1
VOMITING: 0
NAUSEA: 0

## 2023-12-13 ASSESSMENT — PAIN DESCRIPTION - PAIN TYPE
TYPE: ACUTE PAIN

## 2023-12-13 NOTE — PROGRESS NOTES
Bedside report received.  Assessment complete.  A&O x 4. Patient calls appropriately.  Patient ambulates with one person assist.  Patient has 2/10 pain. Pain managed with prescribed medications.  Currently denies N&V. Diet changed to Full liquids by night shift per order, currently tolerating.  Surgical ML wound vac is in place, CDI, no leaks noted at this time.  RAIMUNDO to RLQ, sutured in place and HARMONY.  + void, + flatus, + BM via Ostomy.  Patient denies SOB.  SCD's on.  Patient pleasant with staff and sitting up in bed.  Review plan with of care with patient. Call light and personal belongings within reach. Hourly rounding in place. All needs met at this time.

## 2023-12-13 NOTE — PROGRESS NOTES
"    DATE: 12/13/2023    Post Operative Day 5 exploratory laparotomy and bowel resection.     INTERVAL EVENTS:  Adequate pain control.  Vomited last night. Possible ileus per xray. Stool in ostomy pouch.  Discussed pathology results with patient.    - Encourage ambulation. Out of bed for meals.  - Plan to advance diet tomorrow if tolerating today.  - Home vac at bedside. Wound clinic unable to see patient until Monday 12/18    REVIEW OF SYSTEMS:  Review of Systems   Gastrointestinal:  Positive for abdominal pain. Negative for nausea and vomiting.   All other systems reviewed and are negative.      PHYSICAL EXAMINATION:  Vital Signs: /74   Pulse 66   Temp 37 °C (98.6 °F) (Temporal)   Resp 16   Ht 1.829 m (6' 0.01\")   Wt 99.8 kg (220 lb)   SpO2 95%     Awake and alert.  Abdomen soft, slight distention noted. Midline wound vac off for vac change today.  Ostomy pink, viable, stool and flatus present noted in bag.    LABORATORY VALUES:   Recent Labs     12/11/23 0317 12/12/23 0339 12/13/23 0037   WBC 11.9* 9.6 9.3   RBC 3.68* 3.64* 4.03*   HEMOGLOBIN 11.4* 11.0* 12.8*   HEMATOCRIT 32.3* 31.9* 35.1*   MCV 87.8 87.6 87.1   MCH 31.0 30.2 31.8   MCHC 35.3 34.5 36.5   RDW 42.7 42.2 41.8   PLATELETCT 177 181 241   MPV 10.4 10.3 10.2     Recent Labs     12/11/23 0317 12/12/23 0339 12/13/23 0037   SODIUM 140 144 140   POTASSIUM 4.2 3.9 3.9   CHLORIDE 106 107 104   CO2 28 26 27   GLUCOSE 90 90 110*   BUN 18 16 18   CREATININE 1.36 1.22 1.13   CALCIUM 8.3* 8.1* 9.0                   DVT Prophylaxis: Enoxaparin (Lovenox)              ASSESSMENT AND PLAN:   Perforation of sigmoid colon (HCC)- (present on admission)  Assessment & Plan  Acute perforated sigmoid colon with feculent peritonitis.  12/8 Exploratory laparotomy, sigmoid colon resection and end colostomy creation.    12/11 DC NG tube, advance to full liquid.   12/12 Zosyn day 4 of 4 following definitive source control.  Intraoperative peritoneal cultures + " E.coli, pan sensitive.   -Pathology pending.  -Tolerating fulls, advance to GI soft.   ACS Blue.    Hypothyroid- (present on admission)  Assessment & Plan  Chronic condition treated with levothyroxine.  Resumed maintenance medication on admission.          Discussed patient condition with RN, Patient, and general surgery, Dr. Choudhury.

## 2023-12-13 NOTE — PROGRESS NOTES
0010 Pt reports nausea. Medicated per MAR.    0055 Pt reports that the nausea hasn't subsided, and he now has gas pain. APRN Linsey Wegener contacted and new orders received.    Will continue to monitor for diet intolerance and abdominal distention.     0500 Pt reports further nausea, vomiting, and pain. Abdomen somewhat distended and tender. Anti nausea and pain medications given per MAR. APRN Linsey Wegener updated and new orders received.

## 2023-12-13 NOTE — CARE PLAN
The patient is Stable - Low risk of patient condition declining or worsening    Shift Goals  Clinical Goals: Antibiotics, Drain/Vac/Pain management  Patient Goals: Rest  Family Goals: n/a    Progress made toward(s) clinical / shift goals:  Medicated per MAR, Vac therapy in place

## 2023-12-13 NOTE — WOUND TEAM
" [facility]  Inpatient Services  New Ostomy Follow-up Management & Teaching      Ostomy Nurse Plan of Care - Frequency of Follow-up:   Ostomy nurses to continue to follow for ostomy needs and teaching until patient independent with care or discharge.  Ostomy Nurse follow-up frequency:  With wound vac changes       Past Surgical History:   Procedure Laterality Date    TONSILLECTOMY  2015    Performed by Fernando Fair M.D. at SURGERY SAME DAY [facility] ORS    LARYNGOSCOPY  2015    Performed by Fernando Fair M.D. at SURGERY SAME DAY [facility] ORS    LYMPH NODE EXCISION  2015    Performed by Fernando Fair M.D. at SURGERY SAME DAY [facility]     Surgery Date: 23    Surgeon(s):  Ariel Caal M.D.    Procedure(s):  LAPAROSCOPY     Permanence: temporary     Pertinent History:  48 y.o. male admitted for perforated sigmoid colon and feculent peritonitis resulting in end colostomy creation by Dr. Caal on .  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                     Colostomy 23 Sigmoid LLQ (Active)   Wound Image    23 1200   Stomal Appliance Assessment Clean;Dry;Intact    Stoma Assessment Beefy red    Stoma Shape Budded Less Than One Inch;Round    Stoma Size (in) 1.5    Peristomal Assessment Clean;Dry;Intact    Mucocutaneous Junction Intact    Treatment Appliance Checked    Peristomal Protectant Paste Ring    Stomal Appliance Paste Ring, 2\";2 3/4\" (70mm) CTF    Output (mL) 100 mL    Output Color Brown    WOUND RN ONLY - Stomal Appliance  2 Piece;2 3/4\" (70mm) CTF;Paste Ring, 2\"    Appliance (Pouch) # Pouch: [id number], Barrier: [id number], Paste Rin    Appliance Brand [place]    Secure Start completed Yes    WOUND NURSE ONLY - Time Spent with Patient (mins) 60      Colostomy Interventions:  Removed appliance (using push pull method) - By Ostomy RN with patient assistance    Cleansed dewey-stomal skin with moist warm washcloth - By Ostomy " "RN  Created/Checked template fit - By Ostomy RN with patient assistance    Traced Shape to back of barrier - By Ostomy RN with patient assistance    Cut barrier to stoma size - By patient   Confirmed fit - By Ostomy RN  Removed plastic backing and \"Dog Eared\" paper edges - By Ostomy RN with patient assistance    Stretched paste ring to fit barrier opening - By patient   Applied paste ring to back of barrier - By patient   Applied barrier to skin and adhered with friction - By Ostomy RN with patient assistance    Attached pouch - By patient   Closed Pouch end - By patient     Patient Education:   All questions answered, procedure explained, previous education reinforced    Ostomy RN to follow-up daily for education     Date:  12/13/23    Pt completed all paper education and just needs continued hands on  Date:  12/11/23    Reinforced education provided during last visit  Educated regarding the following things: stoma size/shape. Stoma will likely change sizes in the first 4 to 6 weeks. Currently using the most basic supplies while in the hospital, there are many brands and options in regards to supplies.  Patient practiced emptying pouch with bedside RN/CNA  Date:  12/09/23  Folder/paperwork delivered  Educated regarding the following things: stoma size/shape. Stoma will likely change sizes in the first 4 to 6 weeks. Currently using the most basic supplies while in the hospital, there are many brands and options in regards to supplies.  Educated on when to empty and how to empty, burping appliance, Wear time, Diet (chewing food well) and hydration, Medications, activity including showering and swimming. Pt aware that they should keep an extra set of appliances with them at all times (do not leave in warm cars).  Booklet inside of folder went over, but not other pamphlets in folder      Needs for next visit: None, has 7 sets of supplies    Evaluation:      Date:  12/13/23    Stoma pink and viable.  Patient doing well " "with hands on.  Possible DC tomorrow.  Having output but nauseous.    Date:  12/11/23    Patient observed all steps and verbalized understanding.  Patient started having output today and agreed to start practicing emptying pouch with RN/CNA.    Date:  12/09/23    Patient observing appliance change. Briefer education session provided today as patient is just recently out of surgery.  Additional 2 3/4\" two pieces appliances ordered for next change.        Flatus: Present  Stool Output: brown  Urine Output: NA, Fecal Ostomy  Mobility: Up to chair and Ambulate     DIET ORDERS (From admission to next 24h)       Start     Ordered    12/13/23 0515  Diet Order Diet: Full Liquid  ALL MEALS        Question:  Diet:  Answer:  Full Liquid    12/13/23 0514                     Secure Start Signed:  Completed by Wound team Tech  Outpatient Referral Placed:  Yes       5 Sets of appliances in Ostomy bag for discharge:  Yes    INSURANCE OPTIONS:  If patient has [facility] plus patient will need an [employer] book. If patientt has Medicaid be sure patient has care chest paperwork.    Currently Active Insurance       [facility]            Anticipated Discharge Plans:  Home Health Care and Outpatient Wound Center    Ostomy Supplies for DC:  To be determined in 4 to 6 weeks once stomal edema has fully resolved  "

## 2023-12-13 NOTE — PROGRESS NOTES
Report received from day shift RN, assumed Care.   Patient is AOx4, responds appropriately.      Pain controlled at this time.  Patient is tolerating clear liquid diet, denies nausea/vomiting. Ostomy in place to LLQ with output. MLI with wound vac in place, CDI. RLQ rk drain in place, HARMONY.   Up x1 with FWW with steady gait.    Plan of care discussed, all questions answered.    Educated on use of call light and importance of calling before getting out of bed. Pt verbalizes understanding.    Call light and belongings within reach, treaded slipper socks on, bed in lowest locked position.  All needs met at this time.

## 2023-12-13 NOTE — WOUND TEAM
[facility]  Inpatient Services  Wound and Skin Care Follow-up    Admission Date: 12/8/2023     Last order of IP CONSULT TO WOUND CARE was found on 12/9/2023 from Hospital Encounter on 12/8/2023     HPI, PMH, SH: Reviewed    Past Surgical History:   Procedure Laterality Date    AR LAP,DIAGNOSTIC ABDOMEN  12/8/2023    Procedure: SIGMOID COLECTOMY WITH COLOSTOMY CREATION;  Surgeon: Ariel Caal M.D.;  Location: SURGERY [facility];  Service: General    TONSILLECTOMY  2/6/2015    Performed by Fernando Fair M.D. at SURGERY [facility] DAY [facility]    LARYNGOSCOPY  2/6/2015    Performed by Fernando Fair M.D. at SURGERY [facility] DAY [facility]    LYMPH NODE EXCISION  1/30/2015    Performed by Fernando Fair M.D. at SURGERY [facility]     Social History     Tobacco Use    Smoking status: Former     Current packs/day: 0.00     Average packs/day: 1 pack/day for 15.0 years (15.0 ttl pk-yrs)     Types: Cigarettes     Start date: 1/1/1993     Quit date: 1/1/2008     Years since quitting: 15.9    Smokeless tobacco: Never   Substance Use Topics    Alcohol use: No     Chief Complaint   Patient presents with    Abdominal Pain     9/10 generalized abdominal pain onset this am at home. No relief with 100 mcg Fentanyl with EMS PTA. Denies abdominal history. No BM x 1.5 days, urination decreased from normal amount     Diagnosis: Perforated viscus [R19.8]    Unit where seen by Wound Team: T434/02     WOUND FOLLOW UP RELATED TO:  Mid Abdomen       WOUND TEAM PLAN OF CARE - Frequency of Follow-up:   Nursing to follow dressing orders written for wound care. Contact wound team if area fails to progress, deteriorates or with any questions/concerns if something comes up before next scheduled follow up (See below as to whether wound is following and frequency of wound follow up)  Dressing changes by wound team:                   NPWT change 3 times weekly - Mid Abdomen    WOUND HISTORY:       Past Surgical  History:   Procedure Laterality Date    TONSILLECTOMY  2/6/2015    Performed by Fernando Fair M.D. [facility]    LARYNGOSCOPY  2/6/2015    Performed by Fernando Fair M.D. at [facility]ERY [facility]    LYMPH NODE EXCISION  1/30/2015    Performed by Fernando Fair M.D. at [facility]ERY [facility]       Surgery Date: 12/8/23    Surgeon(s):  Ariel Caal M.D.    Procedure(s):  LAPAROSCOPY     Permanence: temporary     Pertinent History:  48 y.o. male admitted for perforated sigmoid colon and feculent peritonitis resulting in end colostomy creation by Dr. Caal on 12/8.  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        WOUND ASSESSMENT/LDA  Wound 12/08/23 Full Thickness Wound Open Incision Abdomen (Active)   Date First Assessed/Time First Assessed: 12/08/23 2019   Primary Wound Type: Full Thickness Wound  Surgical Wound Type: Open Incision  Location: Abdomen      Assessments 12/13/2023 11:00 AM   Site Assessment Red;Granulation tissue   Periwound Assessment Clean;Dry;Intact   Margins Attached edges;Defined edges   Closure Secondary intention   Drainage Amount Small   Drainage Description Serosanguineous   Treatments Cleansed;Nonselective debridement;Site care   Wound Cleansing Approved Wound Cleanser   Periwound Protectant No-sting Skin Prep;Drape   Dressing Status Clean;Dry;Intact   Dressing Changed Changed   Dressing Cleansing/Solutions Not Applicable   Dressing Options Wound Vac   Dressing Change/Treatment Frequency Monday, Wednesday, Friday, and As Needed   NEXT Dressing Change/Treatment Date 12/15/23   NEXT Weekly Photo (Inpatient Only) 12/18/23   Wound Team Following 3x Weekly   Non-staged Wound Description Full thickness   Shape Linear   Wound Odor None   WOUND NURSE ONLY - Time Spent with Patient (mins) 60       Negative Pressure Wound Therapy 12/08/23 Abdomen (Active)   Placement Date/Time: 12/08/23 1041   Location: Abdomen      Assessments 12/13/2023 11:00  AM   NPWT Pump Mode / Pressure Setting Ulta;Continuous;125 mmHg   Dressing Type Medium;Black Foam (Regular)   Number of Foam Pieces Used 2   Canister Changed No   NEXT Dressing Change/Treatment Date 12/15/23        Vascular:    DONG:   No results found.    Lab Values:    Lab Results   Component Value Date/Time    WBC 9.3 12/13/2023 12:37 AM    RBC 4.03 (L) 12/13/2023 12:37 AM    HEMOGLOBIN 12.8 (L) 12/13/2023 12:37 AM    HEMATOCRIT 35.1 (L) 12/13/2023 12:37 AM         Culture Results show:  Recent Results (from the past 720 hour(s))   CULTURE WOUND W/ GRAM STAIN    Collection Time: 12/08/23  7:19 PM    Specimen: Wound   Result Value Ref Range    Significant Indicator POS (POS)     Source WND     Site Peritoneal Fluid     Culture Result - (A)     Gram Stain Result       Many WBCs.  Few Gram negative rods.  Few Gram positive rods.      Culture Result Escherichia coli  Rare growth   (A)        Susceptibility    Escherichia coli - PETEY     Ampicillin >16 Resistant mcg/mL     Amoxicillin/CA <=8/4 Sensitive mcg/mL     Ceftriaxone <=1 Sensitive mcg/mL     Cefazolin <=2 Sensitive mcg/mL     Ciprofloxacin 0.5 Intermediate mcg/mL     Cefepime <=2 Sensitive mcg/mL     Cefuroxime <=4 Sensitive mcg/mL     Ampicillin/sulbactam 16/8 Intermediate mcg/mL     Ertapenem <=0.5 Sensitive mcg/mL     Tobramycin <=2 Sensitive mcg/mL     Gentamicin <=2 Sensitive mcg/mL     Levofloxacin <=0.5 Sensitive mcg/mL     Minocycline <=4 Sensitive mcg/mL     Moxifloxacin <=2 Sensitive mcg/mL     Pip/Tazobactam <=8 Sensitive mcg/mL     Trimeth/Sulfa <=0.5/9.5 Sensitive mcg/mL     Tigecycline <=2 Sensitive mcg/mL       Pain Level/Medicated:  4% Lidocaine allowed to dwell on wound bed 90min prior and PO pain medications administered by bedside RN 60min prior       INTERVENTIONS BY WOUND TEAM:  Chart and images reviewed. Discussed with bedside RN. All areas of concern (based on picture review, LDA review and discussion with bedside RN) have been thoroughly  assessed. Documentation of areas based on significant findings. This RN in to assess patient. Performed standard wound care which includes appropriate positioning, dressing removal and non-selective debridement. Pictures and measurements obtained weekly if/when required.    Wound:  Mid Abdomen  Preparation for Dressing removal: Dressing soaked with Lidocaine  Cleansed/Non-selectively Debrided with:  Wound cleanser and Gauze  Danielle wound: Cleansed with Wound cleanser and Gauze, Prepped with No Sting and Drape  Primary Dressing:  One piece of spiraled full thickness black regular foam applied into wound bed and secured with drape.  Secondary (Outer) Dressing: A hole was cut in drape and a 2nd piece of half thickness circular black foam was applied as a button for trac pad. Trac pad applied and suction resumed. No leaks noted.     Advanced Wound Care Discharge Planning  Number of Clinicians necessary to complete wound care: 1  Is patient requiring IV pain medications for dressing changes:  No   Length of time for dressing change 30 min. (This does not include chart review, pre-medication time, set up, clean up or time spent charting.)    Interdisciplinary consultation: Patient, Bedside RN (Misty), Cristina MEJIA (Wound RN).  Pressure injury and staging reviewed with N/A.    EVALUATION / RATIONALE FOR TREATMENT:     Date:  12/13/23  Wound Status:  Wound progressing as expected    Wound continues to progress with NPWT. Pt has home wound vac in room and will likely DC in next day or 2 with outpatient follow up.     Date:  12/11/23  Wound Status:  Initial evaluation    Wound is clean and will do well with continued NPWT.         Goals: Steady decrease in wound area and depth weekly.    NURSING PLAN OF CARE ORDERS:  No new orders this visit    NUTRITION RECOMMENDATIONS   Wound Team Recommendations:  N/A     DIET ORDERS (From admission to next 24h)       Start     Ordered    12/13/23 6868  Diet Order Diet: Full Liquid  ALL MEALS         Question:  Diet:  Answer:  Full Liquid    12/13/23 0514                    Anticipated discharge plans:  Home Health Care        Vac Discharge Needs:  Vac Discharge plan is purely a recommendation from wound team and not a requirement for discharge unless otherwise stated by physician.  Regular Vac in use and continued at discharge

## 2023-12-13 NOTE — CARE PLAN
The patient is Stable - Low risk of patient condition declining or worsening    Shift Goals  Clinical Goals: abx, pain mgmt  Patient Goals: rest  Family Goals: n/a    Progress made toward(s) clinical / shift goals:    Problem: Skin Care - Ostomy  Goal: Skin remains free from irritation  Outcome: Progressing   Perform frequent checks of ostomy and appliance. Empty pouch as needed, and educate patient on the importance of keeping it from overfilling.

## 2023-12-13 NOTE — CARE PLAN
The patient is Stable - Low risk of patient condition declining or worsening    Shift Goals  Clinical Goals: Antibiotics, pain/drain/vac managment, ambulation  Patient Goals: Pain management, Rest  Family Goals: n/a    Progress made toward(s) clinical / shift goals:  Medicated per MAR, Up with staff, Vac and drain per flow sheets

## 2023-12-14 LAB
ANION GAP SERPL CALC-SCNC: 8 MMOL/L (ref 7–16)
BASOPHILS # BLD AUTO: 0.4 % (ref 0–1.8)
BASOPHILS # BLD: 0.03 K/UL (ref 0–0.12)
BUN SERPL-MCNC: 16 MG/DL (ref 8–22)
CALCIUM SERPL-MCNC: 8.7 MG/DL (ref 8.5–10.5)
CHLORIDE SERPL-SCNC: 104 MMOL/L (ref 96–112)
CO2 SERPL-SCNC: 28 MMOL/L (ref 20–33)
CREAT SERPL-MCNC: 0.97 MG/DL (ref 0.5–1.4)
EOSINOPHIL # BLD AUTO: 0.18 K/UL (ref 0–0.51)
EOSINOPHIL NFR BLD: 2.6 % (ref 0–6.9)
ERYTHROCYTE [DISTWIDTH] IN BLOOD BY AUTOMATED COUNT: 41 FL (ref 35.9–50)
GFR SERPLBLD CREATININE-BSD FMLA CKD-EPI: 97 ML/MIN/1.73 M 2
GLUCOSE SERPL-MCNC: 101 MG/DL (ref 65–99)
HCT VFR BLD AUTO: 34.4 % (ref 42–52)
HGB BLD-MCNC: 11.9 G/DL (ref 14–18)
IMM GRANULOCYTES # BLD AUTO: 0.09 K/UL (ref 0–0.11)
IMM GRANULOCYTES NFR BLD AUTO: 1.3 % (ref 0–0.9)
LYMPHOCYTES # BLD AUTO: 0.79 K/UL (ref 1–4.8)
LYMPHOCYTES NFR BLD: 11.3 % (ref 22–41)
MAGNESIUM SERPL-MCNC: 1.9 MG/DL (ref 1.5–2.5)
MCH RBC QN AUTO: 30.1 PG (ref 27–33)
MCHC RBC AUTO-ENTMCNC: 34.6 G/DL (ref 32.3–36.5)
MCV RBC AUTO: 86.9 FL (ref 81.4–97.8)
MONOCYTES # BLD AUTO: 0.79 K/UL (ref 0–0.85)
MONOCYTES NFR BLD AUTO: 11.3 % (ref 0–13.4)
NEUTROPHILS # BLD AUTO: 5.12 K/UL (ref 1.82–7.42)
NEUTROPHILS NFR BLD: 73.1 % (ref 44–72)
NRBC # BLD AUTO: 0 K/UL
NRBC BLD-RTO: 0 /100 WBC (ref 0–0.2)
PHOSPHATE SERPL-MCNC: 3.2 MG/DL (ref 2.5–4.5)
PLATELET # BLD AUTO: 237 K/UL (ref 164–446)
PMV BLD AUTO: 10.1 FL (ref 9–12.9)
POTASSIUM SERPL-SCNC: 4 MMOL/L (ref 3.6–5.5)
RBC # BLD AUTO: 3.96 M/UL (ref 4.7–6.1)
SODIUM SERPL-SCNC: 140 MMOL/L (ref 135–145)
WBC # BLD AUTO: 7 K/UL (ref 4.8–10.8)

## 2023-12-14 PROCEDURE — A9270 NON-COVERED ITEM OR SERVICE: HCPCS

## 2023-12-14 PROCEDURE — 83735 ASSAY OF MAGNESIUM: CPT

## 2023-12-14 PROCEDURE — 84100 ASSAY OF PHOSPHORUS: CPT

## 2023-12-14 PROCEDURE — 80048 BASIC METABOLIC PNL TOTAL CA: CPT

## 2023-12-14 PROCEDURE — RXMED WILLOW AMBULATORY MEDICATION CHARGE

## 2023-12-14 PROCEDURE — 700102 HCHG RX REV CODE 250 W/ 637 OVERRIDE(OP)

## 2023-12-14 PROCEDURE — 99024 POSTOP FOLLOW-UP VISIT: CPT

## 2023-12-14 PROCEDURE — 700111 HCHG RX REV CODE 636 W/ 250 OVERRIDE (IP): Mod: JZ

## 2023-12-14 PROCEDURE — 770001 HCHG ROOM/CARE - MED/SURG/GYN PRIV*

## 2023-12-14 PROCEDURE — 85025 COMPLETE CBC W/AUTO DIFF WBC: CPT

## 2023-12-14 RX ORDER — OXYCODONE HYDROCHLORIDE 5 MG/1
5 TABLET ORAL EVERY 4 HOURS PRN
Qty: 18 TABLET | Refills: 0 | Status: SHIPPED | OUTPATIENT
Start: 2023-12-14 | End: 2023-12-18

## 2023-12-14 RX ORDER — ACETAMINOPHEN 500 MG
1000 TABLET ORAL EVERY 6 HOURS PRN
Qty: 30 TABLET | Refills: 0 | COMMUNITY
Start: 2023-12-14

## 2023-12-14 RX ADMIN — ONDANSETRON 4 MG: 4 TABLET, ORALLY DISINTEGRATING ORAL at 04:07

## 2023-12-14 RX ADMIN — ACETAMINOPHEN 1000 MG: 500 TABLET, FILM COATED ORAL at 21:07

## 2023-12-14 RX ADMIN — LEVOTHYROXINE SODIUM 150 MCG: 0.15 TABLET ORAL at 21:07

## 2023-12-14 RX ADMIN — DOCUSATE SODIUM 100 MG: 100 CAPSULE, LIQUID FILLED ORAL at 04:07

## 2023-12-14 RX ADMIN — ENOXAPARIN SODIUM 40 MG: 100 INJECTION SUBCUTANEOUS at 17:17

## 2023-12-14 RX ADMIN — DOCUSATE SODIUM 100 MG: 100 CAPSULE, LIQUID FILLED ORAL at 17:16

## 2023-12-14 ASSESSMENT — ENCOUNTER SYMPTOMS
NAUSEA: 0
ABDOMINAL PAIN: 1
VOMITING: 0

## 2023-12-14 ASSESSMENT — PAIN DESCRIPTION - PAIN TYPE
TYPE: ACUTE PAIN

## 2023-12-14 NOTE — DISCHARGE SUMMARY
DISCHARGE  SUMMARY    DATE OF ADMISSION: 12/8/2023    DATE OF DISCHARGE: 12/15/23    DISCHARGE DIAGNOSIS:  Principal Problem (Resolved):    Pneumoperitoneum  Active Problems:    Perforation of sigmoid colon (HCC)    Hypothyroid  Resolved Problems:    Elevated serum creatinine      CONSULTATIONS:  None    PROCEDURES:  Sigmoid colon resection with end colostomy, performed on 12/8/2023, by Dr. Ariel Caal.    BRIEF HPI and HOSPITAL COURSE:  48-year-old male presented to the emergency department with acute onset of moderate to severe abdominal pain.  He stated the pain initially was in the lower abdomen and upon arrival to the emergency department, was somewhat more generalized.  CT imaging demonstrated moderate free intraperitoneal air, concerning for perforated viscus.  Patient was admitted to the hospital, made n.p.o., initiated on antibiotics and maintenance IV fluids.  He was taken to the operating suite for the above procedure and Abthera vac placement. Intraoperative peritoneal cultures were positive for E. coli, pansensitive.  He completed a 4-day course of Zosyn.  No further antibiotics initiated.  Unfortunately, the patient experienced some emesis on hospital stay day four.  X-ray of the abdomen demonstrated air-filled distended loops of bowel.  Patient's diet was downgraded.  Following morning the patient felt better and there was stool in the patient's colostomy bag, for his diet was advanced to GI soft.  The patient will be discharged with a home wound VAC and a referral was placed for [facility] outpatient wound clinic.  First available appointment is on 12/19/2023 and this was relayed to the patient.  On the day of discharge, the patient is a Danii Coma Score 15.  He is afebrile and nontoxic in appearance.  His colostomy is functioning well and has received education on how to manage it.  The patient had a wound VAC change by the wound team.  He is tolerating a GI soft diet.  Pathology has resulted and  was discussed with the patient.  Prescriptions and follow-up appointments were discussed with the patient.  He understands that he will need to follow-up with colorectal for colostomy reversal and that information will be given to him in the [facility] clinic.  All questions were answered.    DISPOSITION:   Discharged home in stable condition on 12/15/23. The patient was counseled and questions were answered. Specifically, signs and symptoms of infection, respiratory decompensation and persistent or worsening pain were discussed and the patient agrees to seek medical attention if any of these develop.    DISCHARGE MEDICATIONS:  The patients controlled substance history was reviewed and a controlled substance use informed consent (if applicable) was provided by [facility] and the patient has been prescribed.     Medication List        START taking these medications        Instructions   acetaminophen 500 MG Tabs  Commonly known as: Tylenol   Take 2 Tablets by mouth every 6 hours as needed for Moderate Pain. Take as directed on the bottle.  Take as needed for pain  Dose: 1,000 mg     oxyCODONE immediate-release 5 MG Tabs  Commonly known as: Roxicodone   Take 1 Tablet by mouth every four hours as needed for Severe Pain for up to 3 days.  Dose: 5 mg            CONTINUE taking these medications        Instructions   levothyroxine 150 MCG Tabs  Commonly known as: Synthroid   Take 150 mcg by mouth at bedtime.  Dose: 150 mcg            You will be given a prescription for pain medication at discharge. Please take these as directed. It is important to remember not to take medications on an empty stomach as this may cause nausea.  You may also take over the counter acetaminophen and/or NSAIDS (ibuprofen, Aleve, Advil, Motrin) per the package instructions.  You may also use ice to the wound to decrease pain and swelling. You may alternate 20 minutes on and 20 minutes off with the ice for the first 24-48 hours.  Make sure you place a washcloth or towel between the ice pack and your skin.  Please note that narcotic pain medication cannot be refilled unless you are seen by a doctor. Make sure you call the office if you are running low on medication or if the dose you have been prescribed is not working well for you.    ACTIVITY:  After discharge from the hospital, you may resume full routine activities; however, there should be no heavy lifting (greater than 20 pounds or a bag of groceries) and no strenuous activities for at least 2 weeks. The duration may be longer, depending on your surgical procedure. Routine activities of daily living are acceptable. Activity level should be addressed at your post-op follow up appointment. You may drive whenever you are off pain medications and are able to perform the activities needed to drive, i.e., turning, bending, twisting, etc.      WOUND CARE:  You may shower, but do not submerge in a bath for at least two weeks. If you have wound dressings, they may come off after 48 hours. If you have skin glue to the wound, this will fall off on its own, do not pick at it. If you have steri strips to the wound, these will fall off on their own, do not pick at them, may trim the edges if needed.      DIET:  Upon discharge from the hospital, you may resume your normal preoperative diet, unless specifically directed otherwise. Depending on how you are feeling and whether you have nausea or not, you may wish to stay with a bland diet for the first few days. However, you can advance this as quickly as you feel ready.      FOLLOW UP:  [facility]  [address]  [place] [address]  [phone]    Follow up on 12/26/2023  Follow up in 1.5  for wound recheck and referral to Colorectal for colostomy reversal in 4-6 months  Call [phone] to schedule appt.    [facility]  [address]  [place] [address]  [phone]  Follow up on 12/18/2023  Go to your appt on  Monday.    Karan Blair D.O.  [address] [place] [address]  [phone]    Follow up  follow up in 1-2 weeks to inform them of your hospital admission.    Call the office if you have: (1) Fevers to more than 101F, (2) Unusual chest or leg pain, (3) Drainage or fluid from incision that may be foul smelling, increased tenderness or soreness at the wound or the wound edges are no longer together, redness or swelling at the incision site. Do not hesitate to call with any other questions.    TIME SPENT ON DISCHARGE: 35 minutes      ____________________________________________  Felecia Bryant D.N.P.    DD: 12/15/2023 6:00 PM       normal...

## 2023-12-14 NOTE — DISCHARGE INSTRUCTIONS
Post Operative Discharge Instructions:    1. DIET: Upon discharge from the hospital, you may resume your normal preoperative diet, unless specifically directed otherwise. Depending on how you are feeling and whether you have nausea or not, you may wish to stay with a bland diet for the first few days. However, you can advance this as quickly as you feel ready.    2. ACTIVITIES: After discharge from the hospital, you may resume full routine activities; however, there should be no heavy lifting (greater than 20 pounds or a bag of groceries) and no strenuous activities for at least 2 weeks. The duration may be longer, depending on your surgical procedure. Routine activities of daily living are acceptable. Activity level should be addressed at your post-op follow up appointment.    3. DRIVING: You may drive whenever you are off pain medications and are able to perform the activities needed to drive, i.e., turning, bending, twisting, etc.    4. BATHING: You may get the wound wet at any time after leaving the hospital. You may shower, but do not submerge in a bath for at least two weeks.  If you have wound dressings, they may come off after 48 hours.  If you have skin glue to the wound, this will fall off on its own, do not pick at it.  If you have Steri-Strips to the wound, these will fall off on their own, do not pick at them. You may trim the edges if needed.    5. BOWEL FUNCTION:   After surgery, it is not uncommon for patients to develop either frequent or loose stools after meals. This usually corrects itself after a few days, to a few weeks. If this occurs, do not worry; this will resolve on its own.  Constipation is much more common than loose stools. The cause is the combination of pain medication and decreased activity level and possibly the nature of the surgical procedure performed. If you feel this is occurring, you may use an over-the-counter treatment such as MiraLAX (or Milk of Magnesia, Ex-Lax,  Senokot, etc.) until the problem has resolved. Drink plenty of water and try to wean off narcotic pain medications as soon as is comfortable for you.    6. PAIN MEDICATION:   You will be given a prescription for pain medication at discharge. Please take these as directed. It is important to remember not to take medications on an empty stomach as this may cause nausea.  You may also take over the counter acetaminophen and/or NSAIDS (ibuprofen, Aleve, Advil, Motrin) per the package instructions.  You may also use ice to the wound to decrease pain and swelling. You may alternate 20 minutes on and 20 minutes off with the ice for the first 24-48 hours. Make sure you place a washcloth or towel between the ice pack and your skin.  Please note that narcotic pain medication cannot be refilled unless you are seen by a doctor. Make sure you call the office if you are running low on medication or if the dose you have been prescribed is not working well for you.    7.CALL THE [facility] AT [phone] IF YOU HAVE:  (1) Fevers to more than 101F, (2) Unusual chest or leg pain, (3) Drainage or fluid from incision that may be foul smelling, increased tenderness or soreness at the wound or the wound edges are no longer together, redness or swelling at the incision site. Do not hesitate to call with any other questions.      Colostomy Surgery, Adult, Care After  The following information offers guidance on how to care for yourself after your procedure. Your health care provider may also give you more specific instructions. If you have problems or questions, contact your health care provider.  What can I expect after the procedure?  After the procedure, it is common to have:  Swelling at the opening in the abdomen that was created during the procedure (stoma).  Slight bleeding around the stoma.  Redness around the stoma.  Some discomfort at the surgical site.  You will have a colostomy bag, or pouch, in place. The bag  "will be attached to your abdomen with adhesive and will be placed around your stoma. Stool and waste will come out through the stoma and into the bag.  Follow these instructions at home:  Activity  Rest as needed while the stoma area heals.  Return to your normal activities as told by your health care provider. Ask your health care provider what activities are safe for you.  Avoid activities that take a lot of effort and any abdominal exercises for 3 weeks or for as long as told by your health care provider.  You may have to avoid lifting. Ask your health care provider how much you can safely lift.  Incision care  Follow instructions from your health care provider about how to take care of your incision. Make sure you:  Wash your hands with soap and water for at least 20 seconds before and after you change your bandage (dressing). If soap and water are not available, use hand .  Change your dressing as told by your health care provider.  Leave stitches (sutures), skin glue, or adhesive strips in place. These skin closures may need to stay in place for 2 weeks or longer. If adhesive strip edges start to loosen and curl up, you may trim the loose edges. Do not remove adhesive strips completely unless your health care provider tells you to do that.  Stoma care  Keep the stoma area clean.  Clean and dry the skin around the stoma each time you change the colostomy bag. To clean the stoma area:  Remove any adhesive residue using approved \"ostomy\" safe products only. These products are specially designed and safe for your skin and stoma.  Use warm water and only use cleansers that are recommended by your health care provider.  Rinse the stoma area with plain water.  Dry the area well.  Use stoma powder or skin barrier film on your skin only as told by your health care provider. Do not use any other powders, gels, wipes, or creams on the skin around the stoma.  Check the stoma area every day for signs of infection. " Check for:  More redness, swelling, or pain.  More fluid or blood.  Pus or warmth.  Measure the stoma opening regularly and record the size. Watch for changes. Share this information with your health care provider.  Watch for other changes in the appearance of the stoma. The stoma should be moist, slightly raised above skin level, and red or pink in color.  Colostomy bag care  Follow instructions from your health care provider about how to empty or change the colostomy bag.  Keep colostomy supplies with you at all times.  Store all supplies in a cool, dry place.  Empty the colostomy bag:  Whenever it is one-third to one-half full.  At bedtime.  Replace the bag every 3-4 days for the first 6 weeks, then every 4-7 days. The bag should also be changed if the colostomy is leaking. Do not just reinforce the edges, because this may lead to skin damage.  Bathing  Do not take baths, swim, or use a hot tub until your health care provider approves. Ask your health care provider if you may take showers. You may be able to shower with or without the colostomy bag in place. If you bathe with the bag on, dry the bag afterward.  Avoid letting your colostomy bag get wet just after changing it. After changing the bag, wait at least 4-5 hours before letting the bag be exposed to water. This will allow the adhesive on the bag to fully take hold.  Avoid using harsh or oily soaps when you bathe. Avoid applying moisturizers or lotions to the skin around the stoma.  Driving  Follow driving restrictions as told by your health care provider.  Ask your health care provider if the medicine prescribed to you requires you to avoid driving or using machinery.  General instructions  Follow instructions from your health care provider about eating or drinking restrictions.  Take over-the-counter and prescription medicines only as told by your health care provider.  Avoid wearing clothes that are tight directly over your stoma area.  Do not use any  products that contain nicotine or tobacco. These products include cigarettes, chewing tobacco, and vaping devices, such as e-cigarettes. If you need help quitting, ask your health care provider.  If you are a woman, ask your health care provider about becoming pregnant and about using birth control. Medicines may not be absorbed normally after the procedure.  Keep all follow-up visits and home care visits. This is important.  Contact a health care provider if:  You have trouble caring for your stoma or changing the colostomy bag.  You are nauseous or vomiting.  You have any of these signs of infection:  More redness, swelling, or pain at the site of your stoma or around your anus.  More fluid or blood coming from your stoma or your anus.  Warmth around your stoma area.  Pus coming from your stoma.  A fever.  You have a change in the size or appearance of the stoma.  You have abdominal pain, bloating, pressure, or cramping.  You have stool more often or less often than your health care provider tells you to expect.  You produce very little urine. This may be a sign of dehydration.  You have anxiety or concerns about your colostomy.  Get help right away if:  You have abdominal pain that does not go away or becomes severe.  You are vomiting frequently.  No stool is draining through the stoma.  You have chest pain or an irregular heartbeat.  These symptoms may be an emergency. Get help right away. Call 911.  Do not wait to see if the symptoms will go away.  Do not drive yourself to the hospital.  Summary  Follow instructions from your health care provider about how to take care of your incision, stoma, and skin around it.  Follow instructions from your health care provider about how to empty or change your colostomy bag.  Contact a health care provider if you have trouble caring for your stoma or changing the colostomy bag, if there are problems with the skin around the stoma, or if you have any anxiety or concerns  about your colostomy.  Get help right away if you have abdominal pain that does not go away or becomes severe or if you have no stool draining through the stoma.  Keep all follow-up visits and home care visits. This is important.  This information is not intended to replace advice given to you by your health care provider. Make sure you discuss any questions you have with your health care provider.  Document Revised: 08/10/2022 Document Reviewed: 08/10/2022  [employer] Patient Education © 2023 [employer]      Ostomy Support Information  An ostomy is an opening in the belly (abdominal wall) made by surgery. Ostomates are people who have had this procedure. The opening (stoma) allows the kidney or bowel to discharge waste. An external pouch covers the stoma to collect waste. Pouches are are a simple bag and are odor free. Different companies have disposable or reusable pouches to fit one's lifestyle. An ostomy can either be temporary or permanent.   THERE ARE THREE MAIN TYPES OF OSTOMIES  Colostomy. A colostomy is a surgically created opening in the large intestine (colon).   Ileostomy. An ileostomy is a surgically created opening in the small intestine.   Urostomy. A urostomy is a surgically created opening to divert urine away from the bladder.   FREQUENTLY ASKED QUESTIONS  Will I need to be on a special diet?  Most people return to their normal diet when they have recovered from surgery. Be sure to chew your food well, eat a well-balanced diet and drink plenty of fluids. If you experience problems with a certain food, wait a couple of weeks and try it again.  Will there be odor and noises?  Pouching systems are designed to be odor-proof or odor-resistant. There are deodorants that can be used in the pouch. Medications are also available to help reduce odor. Limit gas-producing foods and carbonated beverages. You will experience less gas and fewer noises as you heal from surgery.  How much time will it take to care  for my ostomy?  At first, you may spend a lot of time learning about your ostomy and how to take care of it. As you become more comfortable and skilled at changing the pouching system, it will take very little time to care for it.   Will I be able to return to work?  People with ostomies can perform most jobs. As soon as you have healed from surgery, you should be able to return to work. Heavy lifting (more than 10 pounds) may be discouraged.   What about intimacy?  Sexual relationships and intimacy are important and fulfilling aspects of your life. They should continue after ostomy surgery. Intimacy-related concerns should be discussed openly between you and your partner.   Can I wear regular clothing?  You do not need to wear special clothing. Ostomy pouches are fairly flat and barely noticeable. Elastic undergarments will not hurt the stoma or prevent the ostomy from functioning.   Can I participate in sports?  An ostomy should not limit your involvement in sports. Many people with ostomies are runners, skiers, swimmers or participate in other active lifestyles. Talk with your caregiver first before doing heavy physical activity.  PROFESSIONAL HELP   Resources are available if you need help or have questions about your ostomy.   Specially trained nurses called Wound, Ostomy Continence Nurses (WOCN) are available for consultation in most major medical centers.   The United Ostomy Association (UOA) is a group made up of many local chapters throughout the [place]. These local groups hold meetings and provide support to prospective and existing ostomates. They sponsor educational events and have qualified visitors to make personal or telephone visits. Contact the UOA for the chapter nearest you and for other educational publications.   More detailed information can be found in Colostomy Guide, a publication of the United Ostomy Association (UOA). Contact UOA at 1-789.201.9626 or visit their web site at  www.uoaa.org. The website contains links to other sites, suppliers and resources.   Document Released: 12/20/2004 Document Revised: 03/11/2013 Document Reviewed: 04/21/2010  [employer]Care® Patient Information ©2013 [employer], [facility].    Negative Pressure Wound Therapy Home Guide  Negative pressure wound therapy (NPWT) uses a sponge or foam-like material (dressing) placed on or inside the wound. The wound is then covered and sealed with a cover dressing that sticks to your skin (is adhesive) to keep air out. A tube is attached to the cover dressing, and this tube connects to a small pump. The pump removes drainage from the wound.  NPWT helps to increase blood flow to the wound and heal it from the inside. NPWT also helps pull the edges of the wound together and removes fluids and germs from the wound. NPWT may also be called wound vac.  What are the risks?  NPWT is usually safe to use. However, problems can occur, including:  Skin irritation from the dressing adhesive.  Bleeding.  Infection. Signs of infection include:  More redness, swelling, or pain.  More fluid or blood.  Warmth or hardness around the wound.  Pus or a bad smell.  Red streaks leading from the wound.  Dehydration. Wounds with large amounts of drainage can cause excessive body fluid loss.  Pain.  Supplies needed:  Wound cleanser or salt-water solution (saline).  Skin protectant. This may be a wipe, film, or spray.  Clean or germ-free (sterile) scissors.  New sponge or foam.  Cover dressing.  Gauze pad.  Tape.  Also have available:  Soap and water, or hand .  Disposable gloves.  Eye protection.  A disposable garbage bag.  Protective clothing.  How to change your dressing  Change the dressing as scheduled, if the dressing loses suction, or the pump is off for 2 hours or more.  Prepare to change your dressing    Take pain medicine 30 minutes before changing the dressing if told by your health care provider.  Wash your hands with soap and water for at  least 20 seconds before you change the dressing. If soap and water are not available, use hand   Dry your hands with a clean towel.  Set up a clean station for wound care and set a plastic bag on or near your work surface for trash.  Open the dressing package so that the sponge dressing remains on the inside of the package.  Wear gloves, protective clothing, and eye protection.  Remove the old dressing    Turn off the pump and disconnect the tubing from the dressing.  Carefully remove the adhesive cover dressing in the direction of your hair growth.  Remove the sponge dressing that is inside the wound. If the sponge sticks, use a wound cleanser or saline solution to wet the sponge and help it come off more easily.  Throw the old sponge and cover dressing supplies into the garbage bag.  Check the wound for signs of infection, including a bad smell, drainage (bleeding or pus), or new color changes (black, grey, yellow, or white) in the wound.  Remove your gloves by grabbing the cuff and turning the glove inside out. Place the gloves in the trash immediately.  Wash your hands with soap and water for at least 20 seconds. If soap and water are not available, use hand .  Dry your hands with a clean towel.  Clean your wound  Wear gloves, protective clothing, and eye protection. Follow your health care provider's instructions on how to clean your wound. You may be told to:  Clean the wound using a saline solution or a wound cleanser and a clean gauze pad.  Pat the wound dry with a gauze pad. Do not rub the wound.  Throw the gauze pad into the garbage bag.  Remove your gloves by grabbing the cuff and turning the glove inside out. Place the gloves in the trash immediately.  Wash your hands with soap and water for at least 20 seconds. If soap and water are not available, use hand   Dry your hands with a clean towel.  Apply the new dressing  Wear gloves, protective clothing, and eye protection.  If  told by your health care provider, apply a skin protectant to any skin that will be exposed to adhesive. Let the skin protectant dry.  Cut a piece of new sponge dressing and put it on or in the wound.  Using clean scissors, cut a nickel-sized hole in the new cover dressing.  Apply the cover dressing.  Attach the suction tube over the hole in the cover dressing.  Take off your gloves. Put them in the plastic bag with the old dressing. Tie the bag shut and throw it away.  Wash your hands with soap and water for at least 20 seconds. If soap and water are not available, use hand .  Dry your hands with a clean towel.  Turn the pump back on. The sponge dressing should collapse. Do not change the settings on the machine without talking to a health care provider.  Replace the container in the pump that collects fluid if it is full. Replace the container per the 's instructions or at least once a week, even if it is not full.  Follow your health care provider'sinstructions on how often you need to change and apply the new NPWT dressing.  General tips and recommendations  If the alarm sounds:  Stay calm and prepare to troubleshoot.  Do not turn off the pump or do anything with the dressing.  The alarm may go off for many reasons, including:  The battery is low. Change the battery or plug the device into electrical power.  The dressing has a leak. Find the leak and put tape over the leak.  The fluid collection container is full. Change the fluid container.  Call your health care provider right away if you cannot fix the problem.  Explain to your health care provider what is happening. Follow his or her instructions.  General instructions  Change the dressing as scheduled, if the dressing loses suction, or the pump is off for 2 hours or more.  Do not turn off the pump unless told to do so by your health care provider.  Do not turn off the pump for more than 2 hours. If the pump is off or you lose suction to  the dressing for more than 2 hours, the dressing will need to be changed.  Check the machine frequently to make sure that therapy is on and that all clamps are open.  Do not use over-the-counter medicated or antiseptic creams, sprays, liquids, or dressings unless told by your health care provider.  Do not take baths, swim, or use a hot tub until your health care provider approves. You may only be allowed to take sponge baths. Ask your health care provider if you may take showers. If your health care provider says it is okay to shower:  Do not take the pump into the shower.  Make sure the wound dressing is protected and sealed. The wound dressing must stay dry.  Contact a health care provider if:  You have new pain.  You develop irritation, a rash, or itching around the wound or dressing.  You see new color changes (black, grey, yellow, or white) in the wound.  The dressing changes are painful or cause bleeding.  The pump has been off for more than 2 hours, and you do not know how to change the dressing.  The pump alarm goes off, and you do not know what to do.  Get help right away if:  You have a lot of bleeding.  The wound breaks open.  You have severe pain.  You have signs of infection, such as:  More redness, swelling, or pain.  More fluid or blood.  Warmth or hardness.  Pus or a bad smell.  Red streaks leading from the wound.  A fever.  You see a sudden change in the color or texture of the drainage.  You have signs of dehydration, such as:  Little or no tears, urine, or sweat.  Muscle cramps.  Very dry mouth.  Headache.  Dizziness or confusion.  Summary  NPWT uses a sponge or dressing placed on or inside the wound.  NPWT helps to increase blood flow to the wound and heal it from the inside.  Follow your health care provider's instructions on how to clean your wound and how to change the dressing.  Contact a health care provider if you have new pain, an irritation, or a rash, or if the alarm goes off and you do  not know what to do.  Get help right away if you have a lot of bleeding, your wound breaks open, or you have severe pain. Also, get help if you have signs of infection.  This information is not intended to replace advice given to you by your health care provider. Make sure you discuss any questions you have with your health care provider.  Document Revised: 05/16/2022 Document Reviewed: 05/16/2022  [employer] Patient Education © 2023 [employer]

## 2023-12-14 NOTE — CARE PLAN
The patient is Stable - Low risk of patient condition declining or worsening    Shift Goals  Clinical Goals: ostomy care and education, pain control, ambulate  Patient Goals: Rest  Family Goals: n/a    Progress made toward(s) clinical / shift goals:  Educating pt on ostomy care with every encounter, wound team also providing formal education.  Pt declines pain medication so far this shift.  Pt able to ambulate with minimal assistance multiple times this shift.    Patient is not progressing towards the following goals:

## 2023-12-14 NOTE — CARE PLAN
The patient is Stable - Low risk of patient condition declining or worsening    Shift Goals  Clinical Goals: Nausea Management; [place] Monitoring; Monitor drains/Ostomy  Patient Goals: Rest  Family Goals: n/a    Progress made toward(s) clinical / shift goals:  Patient medicated per MAR. Non-pharmacologic comfort measures implemented. Safety discussed. Education provided. Ambulation and repositioning encouraged.     Problem: Pain - Standard  Goal: Alleviation of pain or a reduction in pain to the patient’s comfort goal  Outcome: Progressing     Problem: Knowledge Deficit - Ostomy  Goal: Patient will demonstrate ability to manage and maintain ostomy  Outcome: Progressing     Problem: Knowledge Deficit - Standard  Goal: Patient and family/care givers will demonstrate understanding of plan of care, disease process/condition, diagnostic tests and medications  Outcome: Progressing

## 2023-12-14 NOTE — DISCHARGE PLANNING
Case Management Discharge Planning    Admission Date: 12/8/2023  GMLOS: 9.8  ALOS: 6    6-Clicks ADL Score: 20  6-Clicks Mobility Score: 15  PT and/or OT Eval ordered: No  Post-acute Referrals Ordered: NA  Post-acute Choice Obtained: NA  Has referral(s) been sent to post-acute provider:  LESLYE      Anticipated Discharge Dispo: Discharge Disposition: Discharged to home/self care (01)    Action(s) Taken: Chart review completed. Patient discussed during IDT rounds. RNCM touched base with patient this am to provide outpatient wound clinic contact and appointment information. Patient remains not MC for discharge at this time due to c/o increased pain.    Escalations Completed: None    Medically Clear: No    Next Steps: CM to continue to follow up with IDT regarding discharge planning needs/barriers.     Barriers to Discharge: Medical clearance    Is the patient up for discharge tomorrow: No

## 2023-12-14 NOTE — PROGRESS NOTES
"    DATE: 12/14/2023    Post Operative Day 6 exploratory laparotomy and bowel resection.    INTERVAL EVENTS:  Tolerating diet.   Adequate pain control.  Ambulating.    - Advance diet to soft GI.  - Mobilize.  - Plan to discharge home Friday 12/15. Wound clinic unable to see patient until Monday 18th.    REVIEW OF SYSTEMS:  Review of Systems   Gastrointestinal:  Positive for abdominal pain. Negative for nausea and vomiting.   All other systems reviewed and are negative.      PHYSICAL EXAMINATION:  Vital Signs: /83   Pulse 71   Temp 37 °C (98.6 °F) (Temporal)   Resp 16   Ht 1.829 m (6' 0.01\")   Wt 99.8 kg (220 lb)   SpO2 96%     Awake and alert.  Abdomen soft, slight tenderness.  Midline wound vac working.  Ostomy pink, viable, liquid stool present in bag.    LABORATORY VALUES:   Recent Labs     12/12/23 0339 12/13/23  0037 12/14/23  0558   WBC 9.6 9.3 7.0   RBC 3.64* 4.03* 3.96*   HEMOGLOBIN 11.0* 12.8* 11.9*   HEMATOCRIT 31.9* 35.1* 34.4*   MCV 87.6 87.1 86.9   MCH 30.2 31.8 30.1   MCHC 34.5 36.5 34.6   RDW 42.2 41.8 41.0   PLATELETCT 181 241 237   MPV 10.3 10.2 10.1     Recent Labs     12/12/23 0339 12/13/23  0037 12/14/23  0558   SODIUM 144 140 140   POTASSIUM 3.9 3.9 4.0   CHLORIDE 107 104 104   CO2 26 27 28   GLUCOSE 90 110* 101*   BUN 16 18 16   CREATININE 1.22 1.13 0.97   CALCIUM 8.1* 9.0 8.7               DVT Prophylaxis: Enoxaparin (Lovenox)              ASSESSMENT AND PLAN:   Perforation of sigmoid colon (HCC)- (present on admission)  Assessment & Plan  Acute perforated sigmoid colon with feculent peritonitis.  12/8 Exploratory laparotomy, sigmoid colon resection and end colostomy creation.    12/11 DC NG tube, advance to full liquid.   12/12 Zosyn day 4 of 4 following definitive source control.  Intraoperative peritoneal cultures + E.coli, pan sensitive.   -Pathology discussed with patient  -Tolerating fulls, advance to GI soft.   12/13 Emesis overnight. Xray with air filled distended loops " of bowel.   - Full liquid diet.  12/14 No emesis reported. Tolerating diet. Advanced to GI soft.  ACS Blue.    Hypothyroid- (present on admission)  Assessment & Plan  Chronic condition treated with levothyroxine.  Resumed maintenance medication on admission.          Discussed patient condition with RN, Patient, and general surgery, Dr. Choudhury.

## 2023-12-14 NOTE — PROGRESS NOTES
"Received report from previous shift RN at 1900.  Assessment complete.  A&O x 4. Patient calls appropriately.  Patient ambulates independently..   Patient has 2/10 pain. No interventions for pain implemented at this time, will continue to monitor.  Denies N&V. Tolerating full liquid diet.  Skin per flowsheets.  + void, + flatus, + BM via ostomy.  Patient denies SOB on RA.  SCD's refused.  /84   Pulse 70   Temp 37.1 °C (98.8 °F) (Temporal)   Resp 18   Ht 1.829 m (6' 0.01\")   Wt 99.8 kg (220 lb)   SpO2 97%   BMI 29.83 kg/m²     Patient pleasant and cooperative throughout assessment.  Reviewed plan of care with patient, pt verbalizes understanding. Call light and personal belongings with in reach. Hourly rounding in place. All needs met at this time.    "

## 2023-12-15 ENCOUNTER — PHARMACY VISIT (OUTPATIENT)
Dept: PHARMACY | Facility: MEDICAL CENTER | Age: 48
End: 2023-12-15
Payer: COMMERCIAL

## 2023-12-15 VITALS
TEMPERATURE: 98.1 F | RESPIRATION RATE: 18 BRPM | BODY MASS INDEX: 27.47 KG/M2 | OXYGEN SATURATION: 95 % | DIASTOLIC BLOOD PRESSURE: 68 MMHG | SYSTOLIC BLOOD PRESSURE: 116 MMHG | HEART RATE: 82 BPM | HEIGHT: 72 IN | WEIGHT: 202.82 LBS

## 2023-12-15 PROCEDURE — 700102 HCHG RX REV CODE 250 W/ 637 OVERRIDE(OP)

## 2023-12-15 PROCEDURE — 97605 NEG PRS WND THER DME<=50SQCM: CPT

## 2023-12-15 PROCEDURE — 97602 WOUND(S) CARE NON-SELECTIVE: CPT

## 2023-12-15 PROCEDURE — 99024 POSTOP FOLLOW-UP VISIT: CPT

## 2023-12-15 PROCEDURE — A9270 NON-COVERED ITEM OR SERVICE: HCPCS

## 2023-12-15 RX ADMIN — ACETAMINOPHEN 1000 MG: 500 TABLET, FILM COATED ORAL at 10:44

## 2023-12-15 RX ADMIN — LIDOCAINE HYDROCHLORIDE 1 APPLICATION: 40 SOLUTION ORAL at 10:00

## 2023-12-15 ASSESSMENT — PAIN DESCRIPTION - PAIN TYPE
TYPE: ACUTE PAIN

## 2023-12-15 ASSESSMENT — FIBROSIS 4 INDEX: FIB4 SCORE: 1.4

## 2023-12-15 NOTE — PROGRESS NOTES
Transferring pt to discharge lounge   Pt dressed  Switched wound vac to patients home vac  Pt educated on home vac  All belongings with pt

## 2023-12-15 NOTE — CARE PLAN
The patient is Stable - Low risk of patient condition declining or worsening    Shift Goals  Clinical Goals: Vac/Ostomy/Drain Management; Discharge Education  Patient Goals: Rest  Family Goals: n/a    Progress made toward(s) clinical / shift goals:  Patient medicated per MAR. Non-pharmacologic comfort measures implemented. Safety discussed. Education provided. Ambulation and repositioning encouraged.     Problem: Pain - Standard  Goal: Alleviation of pain or a reduction in pain to the patient’s comfort goal  Outcome: Progressing     Problem: Knowledge Deficit - Ostomy  Goal: Patient will demonstrate ability to manage and maintain ostomy  Outcome: Progressing     Problem: Knowledge Deficit - Standard  Goal: Patient and family/care givers will demonstrate understanding of plan of care, disease process/condition, diagnostic tests and medications  Outcome: Progressing

## 2023-12-15 NOTE — PROGRESS NOTES
Post Operative Day 7 exploratory laparotomy and bowel resection.    Adequate pain control.  Tolerating GI soft.  Ostomy healthy looking and functioning, stool in bag.      A/O x 4.  Respiratory rate even and unlabored.  Abdomen with midline ABThera.  Wound care team to do one last wound VAC change prior to discharge today.  Abdomen soft and nontender.  Ostomy pink, viable and stool present in bag.  Patient states that he has been shown how to care for his ostomy and feels that he is capable of managing it himself.    Cleared for discharge. Discussed prescriptions and follow up appts. He understands that he will need a referral to Colorectal but will receive that in the [facility] clinic on 12/22/23. All questions answered.

## 2023-12-15 NOTE — WOUND TEAM
" [facility]  Inpatient Services  New Ostomy Follow-up Management & Teaching      Ostomy Nurse Plan of Care - Frequency of Follow-up:   Ostomy nurses to continue to follow for ostomy needs and teaching until patient independent with care or discharge.  Ostomy Nurse follow-up frequency:  With wound vac changes       Past Surgical History:   Procedure Laterality Date    TONSILLECTOMY  2015    Performed by Fernando Fair M.D. at SURGERY SAME DAY [facility] ORS    LARYNGOSCOPY  2015    Performed by Fernando Fair M.D. at SURGERY SAME DAY [facility] ORS    LYMPH NODE EXCISION  2015    Performed by Fernando Fair M.D. at SURGERY SAME DAY [facility] ORS     Surgery Date: 23    Surgeon(s):  Ariel Caal M.D.    Procedure(s):  LAPAROSCOPY     Permanence: temporary     Pertinent History:  48 y.o. male admitted for perforated sigmoid colon and feculent peritonitis resulting in end colostomy creation by Dr. Caal on .  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                       Colostomy 23 Sigmoid LLQ (Active)   Wound Image    23 1200   Stomal Appliance Assessment Changed 12/15/23 1100   Stoma Assessment Beefy red 12/15/23 1100   Stoma Shape Budded Less Than One Inch;Round 12/15/23 1100   Stoma Size (in) 1.5 12/15/23 1100   Peristomal Assessment Intact 12/15/23 1100   Mucocutaneous Junction Intact 12/15/23 1100   Treatment Appliance Changed;Cleansed with water/washcloth;Site care 12/15/23 1100   Peristomal Protectant Paste Ring 12/15/23 1100   Stomal Appliance Paste Ring, 2\";2 3/4\" (70mm) CTF 12/15/23 1100   Output (mL) 200 mL 12/15/23 0919   Output Color Brown 12/15/23 0919   WOUND RN ONLY - Stomal Appliance  2 Piece;2 3/4\" (70mm) CTF;Paste Ring, 2\" 23 1200   Appliance (Pouch) # Pouch: [id number], Barrier: 60212, Paste Rin 23 1200   Appliance Brand [place] 23 1200   Secure Start completed Yes 23 1200   WOUND NURSE ONLY - Time Spent " "with Patient (mins) 60 12/15/23 1100                                                       Colostomy Interventions:  Removed appliance (using push pull method) - By patient   Cleansed dewey-stomal skin with moist warm washcloth - By patient   Created/Checked template fit - By patient   Traced Shape to back of barrier - By patient   Cut barrier to stoma size - By patient   Confirmed fit - By patient   Removed plastic backing and \"Dog Eared\" paper edges - By patient   Stretched paste ring to fit barrier opening - By patient   Applied paste ring to back of barrier - By patient   Applied barrier to skin and adhered with friction - By patient   Attached pouch - By Ostomy RN  Closed Pouch end - By Ostomy RN    Patient Education:   All questions answered, procedure explained, previous education reinforced    Ostomy RN to follow-up daily for education     Date:  12/15/23    Pt completed all paper education and just needs continued hands on  Date:  12/13/23    Pt completed all paper education and just needs continued hands on  Date:  12/11/23    Reinforced education provided during last visit  Educated regarding the following things: stoma size/shape. Stoma will likely change sizes in the first 4 to 6 weeks. Currently using the most basic supplies while in the hospital, there are many brands and options in regards to supplies.  Patient practiced emptying pouch with bedside RN/CNA  Date:  12/09/23  Folder/paperwork delivered  Educated regarding the following things: stoma size/shape. Stoma will likely change sizes in the first 4 to 6 weeks. Currently using the most basic supplies while in the hospital, there are many brands and options in regards to supplies.  Educated on when to empty and how to empty, burping appliance, Wear time, Diet (chewing food well) and hydration, Medications, activity including showering and swimming. Pt aware that they should keep an extra set of appliances with them at all times (do not leave in " "warm cars).  Booklet inside of folder went over, but not other pamphlets in folder      Needs for next visit: NONE    Evaluation:      Date:  12/15/23    Assisted with change and did well. Pt will continue to have education with vac changes 3x weekly.    Date:  12/13/23    Stoma pink and viable.  Patient doing well with hands on.  Possible DC tomorrow.  Having output but nauseous.    Date:  12/11/23    Patient observed all steps and verbalized understanding.  Patient started having output today and agreed to start practicing emptying pouch with RN/CNA.    Date:  12/09/23    Patient observing appliance change. Briefer education session provided today as patient is just recently out of surgery.  Additional 2 3/4\" two pieces appliances ordered for next change.        Flatus: Present  Stool Output: brown  Urine Output: NA, Fecal Ostomy  Mobility: Ambulating at Baseline    DIET ORDERS (From admission to next 24h)       Start     Ordered    12/14/23 0705  Diet Order Diet: Low Fiber(GI Soft)  ALL MEALS        Question:  Diet:  Answer:  Low Fiber(GI Soft)    12/14/23 0704                     Secure Start Signed:  Completed by Wound team Tech  Outpatient Referral Placed:  Yes         5 Sets of appliances in Ostomy bag for discharge:  Yes    INSURANCE OPTIONS:  If patient has [facility] plus patient will need an [employer] book. If patientt has Medicaid be sure patient has care chest paperwork.    Currently Active Insurance       Payor Plan    [facility]            Anticipated Discharge Plans:  Outpatient Wound Center    Ostomy Supplies for DC:  To be determined in 4 to 6 weeks once stomal edema has fully resolved  "

## 2023-12-15 NOTE — PROGRESS NOTES
"Received report from previous shift RN at 1900.  Assessment complete.  A&O x 4. Patient calls appropriately.  Patient ambulates independently.   Patient has 3/10 pain. Pain managed with prescribed medications per MAR and rest.  Denies N&V. Tolerating GI Soft diet.  Skin per flowsheets.  + void, + flatus, + BM via ostomy.  Patient denies SOB on RA.  /72   Pulse 77   Temp 36.5 °C (97.7 °F) (Temporal)   Resp 18   Ht 1.829 m (6' 0.01\")   Wt 99.8 kg (220 lb)   SpO2 97%   BMI 29.83 kg/m²     Patient pleasant and cooperative throughout assessment.  Reviewed plan of care with patient, pt verbalizes understanding. Call light and personal belongings with in reach. Hourly rounding in place. All needs met at this time.    "

## 2023-12-15 NOTE — WOUND TEAM
[facility]  Inpatient Services  Wound and Skin Care Follow-up    Admission Date: 12/8/2023     Last order of IP CONSULT TO WOUND CARE was found on 12/9/2023 from Hospital Encounter on 12/8/2023     HPI, PMH, SH: Reviewed    Past Surgical History:   Procedure Laterality Date    CO LAP,DIAGNOSTIC ABDOMEN  12/8/2023    Procedure: SIGMOID COLECTOMY WITH COLOSTOMY CREATION;  Surgeon: Ariel Caal M.D.;  Location: SURGERY [facility];  Service: General    TONSILLECTOMY  2/6/2015    Performed by Fernando Fair M.D. at SURGERY SAME DAY [facility] ORS    LARYNGOSCOPY  2/6/2015    Performed by Fernando Fair M.D. at SURGERY SAME DAY [facility] ORS    LYMPH NODE EXCISION  1/30/2015    Performed by Fernando Fair M.D. at SURGERY SAME DAY [facility] ORS     Social History     Tobacco Use    Smoking status: Former     Current packs/day: 0.00     Average packs/day: 1 pack/day for 15.0 years (15.0 ttl pk-yrs)     Types: Cigarettes     Start date: 1/1/1993     Quit date: 1/1/2008     Years since quitting: 15.9    Smokeless tobacco: Never   Substance Use Topics    Alcohol use: No     Chief Complaint   Patient presents with    Abdominal Pain     9/10 generalized abdominal pain onset this am at home. No relief with 100 mcg Fentanyl with EMS PTA. Denies abdominal history. No BM x 1.5 days, urination decreased from normal amount     Diagnosis: Perforated viscus [R19.8]    Unit where seen by Wound Team: T434/02     WOUND FOLLOW UP RELATED TO:  Mid Abdomen       WOUND TEAM PLAN OF CARE - Frequency of Follow-up:   Nursing to follow dressing orders written for wound care. Contact wound team if area fails to progress, deteriorates or with any questions/concerns if something comes up before next scheduled follow up (See below as to whether wound is following and frequency of wound follow up)  Dressing changes by wound team:                   NPWT change 3 times weekly - Mid Abdomen    WOUND HISTORY:       Past Surgical  History:   Procedure Laterality Date    TONSILLECTOMY  2/6/2015    Performed by Fernando Fair M.D. at SURGERY SAME DAY [facility]    LARYNGOSCOPY  2/6/2015    Performed by Fernando Fair M.D. at SURGERY SAME DAY [facility]    LYMPH NODE EXCISION  1/30/2015    Performed by Fernando Fair M.D. at SURGERY SAME DAY [facility]     Surgery Date: 12/8/23    Surgeon(s):  Ariel Caal M.D.    Procedure(s):  LAPAROSCOPY     Permanence: temporary     Pertinent History:  48 y.o. male admitted for perforated sigmoid colon and feculent peritonitis resulting in end colostomy creation by Dr. Caal on 12/8.  [facility] metastatic squamous cell carcinoma of R neck.        WOUND ASSESSMENT/LDA  Wound 12/08/23 Full Thickness Wound Open Incision Abdomen (Active)   Date First Assessed/Time First Assessed: 12/08/23 2019   Primary Wound Type: Full Thickness Wound  Surgical Wound Type: Open Incision  Location: Abdomen      Assessments 12/15/2023 11:00 AM   Wound Image     Site Assessment Red;Early/partial granulation   Periwound Assessment Clean;Dry;Intact   Margins Attached edges;Defined edges   Closure Secondary intention   Drainage Amount Small   Drainage Description Serosanguineous   Treatments Cleansed;Nonselective debridement;Site care;Topical Lidocaine   Wound Cleansing Approved Wound Cleanser   Periwound Protectant No-sting Skin Prep;Drape   Dressing Status Clean;Dry;Intact   Dressing Changed Changed   Dressing Cleansing/Solutions Not Applicable   Dressing Options Wound Vac   Dressing Change/Treatment Frequency Monday, Wednesday, Friday, and As Needed   NEXT Dressing Change/Treatment Date 12/18/23   NEXT Weekly Photo (Inpatient Only) 12/18/23   Wound Team Following 3x Weekly   Non-staged Wound Description Full thickness   Shape Linear   Wound Odor None   WOUND NURSE ONLY - Time Spent with Patient (mins) 60       Negative Pressure Wound Therapy 12/08/23 Abdomen (Active)   Placement Date/Time: 12/08/23 1041   Location:  Abdomen      Assessments 12/15/2023 11:00 AM   NPWT Pump Mode / Pressure Setting [place];Continuous;125 mmHg   Dressing Type Medium;Black Foam (Regular)   Number of Foam Pieces Used 2   Canister Changed No   NEXT Dressing Change/Treatment Date 12/18/23        Vascular:    DONG:   No results found.    Lab Values:    Lab Results   Component Value Date/Time    WBC 7.0 12/14/2023 05:58 AM    RBC 3.96 (L) 12/14/2023 05:58 AM    HEMOGLOBIN 11.9 (L) 12/14/2023 05:58 AM    HEMATOCRIT 34.4 (L) 12/14/2023 05:58 AM         Culture Results show:  Recent Results (from the past 720 hour(s))   CULTURE WOUND W/ GRAM STAIN    Collection Time: 12/08/23  7:19 PM    Specimen: Wound   Result Value Ref Range    Significant Indicator POS (POS)     Source WND     Site Peritoneal Fluid     Culture Result - (A)     Gram Stain Result       Many WBCs.  Few Gram negative rods.  Few Gram positive rods.      Culture Result Escherichia coli  Rare growth   (A)        Susceptibility    Escherichia coli - PETEY     Ampicillin >16 Resistant mcg/mL     Amoxicillin/CA <=8/4 Sensitive mcg/mL     Ceftriaxone <=1 Sensitive mcg/mL     Cefazolin <=2 Sensitive mcg/mL     Ciprofloxacin 0.5 Intermediate mcg/mL     Cefepime <=2 Sensitive mcg/mL     Cefuroxime <=4 Sensitive mcg/mL     Ampicillin/sulbactam 16/8 Intermediate mcg/mL     Ertapenem <=0.5 Sensitive mcg/mL     Tobramycin <=2 Sensitive mcg/mL     Gentamicin <=2 Sensitive mcg/mL     Levofloxacin <=0.5 Sensitive mcg/mL     Minocycline <=4 Sensitive mcg/mL     Moxifloxacin <=2 Sensitive mcg/mL     Pip/Tazobactam <=8 Sensitive mcg/mL     Trimeth/Sulfa <=0.5/9.5 Sensitive mcg/mL     Tigecycline <=2 Sensitive mcg/mL       Pain Level/Medicated:  4% Lidocaine allowed to dwell on wound bed 90min prior       INTERVENTIONS BY WOUND TEAM:  Chart and images reviewed. Discussed with bedside RN. All areas of concern (based on picture review, LDA review and discussion with bedside RN) have been thoroughly assessed.  Documentation of areas based on significant findings. This RN in to assess patient. Performed standard wound care which includes appropriate positioning, dressing removal and non-selective debridement. Pictures and measurements obtained weekly if/when required.    Wound:  MId Abdomen  Preparation for Dressing removal: Dressing soaked with Lidocaine  Cleansed/Non-selectively Debrided with:  Wound cleanser and Gauze  Danielle wound: Cleansed with Wound cleanser and Gauze, Prepped with No Sting and Drape  Primary Dressing:  One piece of full thickness spiraled black regular foam applied into wound bed. Foam secured with drape.  Secondary (Outer) Dressing: A hole was cut in drape and a 2nd piece of half thickness circular black foam applied as a button for trac pad. Trac pad applied and suction resumed. No leaks noted.    RAIMUNDO Drain removed per MD order. Replaced with dry gauze and tegaderm.    Advanced Wound Care Discharge Planning  Number of Clinicians necessary to complete wound care: 1  Is patient requiring IV pain medications for dressing changes:  No   Length of time for dressing change 30 min. (This does not include chart review, pre-medication time, set up, clean up or time spent charting.)    Interdisciplinary consultation: Patient, Bedside RN (Misty), Cristina MEJIA (Wound RN).  Pressure injury and staging reviewed with N/A.    EVALUATION / RATIONALE FOR TREATMENT:     Date:  12/15/23  Wound Status:  Wound progressing as expected    Wound continues to progress, small bleeder noted at edge. Continued with NPWT. Pt has follow up outpatient.    Date:  12/13/23  Wound Status:  Wound progressing as expected    Wound continues to progress with NPWT. Pt has home wound vac in room and will likely DC in next day or 2 with outpatient follow up.     Date:  12/11/23  Wound Status:  Initial evaluation    Wound is clean and will do well with continued NPWT.         Goals: Steady decrease in wound area and depth weekly.    NURSING PLAN  OF CARE ORDERS:  No new orders this visit    NUTRITION RECOMMENDATIONS   Wound Team Recommendations:  N/A     DIET ORDERS (From admission to next 24h)       Start     Ordered    12/14/23 0705  Diet Order Diet: Low Fiber(GI Soft)  ALL MEALS        Question:  Diet:  Answer:  Low Fiber(GI Soft)    12/14/23 0704                  Anticipated discharge plans:  Outpatient Wound Center        Vac Discharge Needs:  Vac Discharge plan is purely a recommendation from wound team and not a requirement for discharge unless otherwise stated by physician.  Regular Vac in use and continued at discharge

## 2023-12-16 NOTE — PROGRESS NOTES
Pt was brought down to discharge lounge. Pt states having all belongings. This RN removed both IV's. Pt got meds via meds to beds. Pt is going home via cab. Pt did not have any questions regarding discharge.

## 2023-12-18 ENCOUNTER — NON-PROVIDER VISIT (OUTPATIENT)
Dept: WOUND CARE | Facility: MEDICAL CENTER | Age: 48
End: 2023-12-18
Attending: SURGERY
Payer: COMMERCIAL

## 2023-12-18 PROCEDURE — 99212 OFFICE O/P EST SF 10 MIN: CPT

## 2023-12-18 PROCEDURE — 99999 PR NO CHARGE: CPT | Performed by: NURSE PRACTITIONER

## 2023-12-18 PROCEDURE — 97606 NEG PRS WND THER DME>50 SQCM: CPT

## 2023-12-18 PROCEDURE — 99211 OFF/OP EST MAY X REQ PHY/QHP: CPT

## 2023-12-18 NOTE — PATIENT INSTRUCTIONS
Change colostomies every 5-7 days. Change appliance immediately if it is leaking or peristomal skin feels irritated, has itching, or  burning.   To change the appliance, remove previous appliance, cleanse peristomal skin with warm water/washcloth, pat dry, make an ostomy template or use cardboard measuring guide and trace ostomy shape onto back of barrier, cut out barrier, apply a paste ring around barrier opening and apply appliance. Empty pouches when no more than ½ full. Check contents every 2 hours or as needed. Do not leave soap residue on tissue and do not use baby wipes or skin prep wipes.       Wound VAC at 125mmHg continuous or intermittent with black foam. Dressing will be changed every MWF at the wound clinic or with your home health nurse.  If you are having issues with your wound VAC, please consider patching leaks, changing the canister, or calling [phone] for troubleshooting. If the wound VAC has been off or un-operational for over 2 hours, call wound care center to inform them and remove all dressings including black foam and replace with normal saline damp gauze. Educated on how to change cannister as well.    Should you experience any significant changes in your wound(s), such as infection (redness, swelling, localized heat, increased pain, fever > 101 F, chills) or have any questions regarding your home care instructions, please contact the wound center at [phone]. If after hours, contact your primary care physician or go to the hospital emergency room.   Keep dressing clean, dry and cover when bathing. Only change dressing if it's over saturated, soiled or falls off.    Eat protein, stay hydrated, get rest for wound healing. Do no lift anything more than 10 pounds, but you can go for walks. Call [facility] to make a follow-up with your surgeon.

## 2023-12-18 NOTE — CERTIFICATION
"Advanced Wound Care  [facility]  [address]  [place] [address]  [phone] Fax#    Initial Ostomy & Wound Evaluation  For 90 Day Certification Period:  12/18/23-3/18/24    Referring Provider:  Ariel Caal M.D   Primary Provider: Karan Blair D.O  Consulting Providers: LINDA Villalpando  Surgeon: Ariel Caal M.D     Start of Care: 12/18/23  Reason for referral: new colostomy & midline wound    SUBJECTIVE:  \"Is this where I go for both the wound & ostomy, or do I need to make another appointment somewhere else?\"    HPI: 48 year old male presents with new colostomy and midline abdominal wound. On 12/8/23, patient presented to the ER with severe abdominal pain that started that morning and increased after vomiting. Patient was found to have free intraperitoneal air. That same day, Dr. Caal preformed a Sigmoid colon resection with end colostomy and application of midline abdominal wound NPWT due to Perforated sigmoid colon & feculent peritonitis. Patient was seen during his hospital stay by the IP wound team for NPWT changes and ostomy education.     Past Medical Hx:   Past Medical History:   Diagnosis Date    Cancer (HCC)       Surgical Hx:   Past Surgical History:   Procedure Laterality Date    [place] LAP,DIAGNOSTIC ABDOMEN  12/8/2023    Procedure: SIGMOID COLECTOMY WITH COLOSTOMY CREATION;  Surgeon: Ariel Caal M.D.;  Location: SURGERY [facility];  Service: General    TONSILLECTOMY  2/6/2015    Performed by Fernando Fair M.D. at [facility]    LARYNGOSCOPY  2/6/2015    Performed by Fernando Fair M.D. at [facility]    LYMPH NODE EXCISION  1/30/2015    Performed by Fernando Fair M.D. at [facility]      Medications:   Current Outpatient Medications on File Prior to Visit   Medication Sig Dispense Refill    acetaminophen (TYLENOL) 500 MG Tab Take 2 Tablets by mouth every 6 hours as needed " "for Moderate Pain. Take as directed on the bottle.  Take as needed for pain 30 Tablet 0    levothyroxine (SYNTHROID) 150 MCG Tab Take 150 mcg by mouth at bedtime.      oxyCODONE immediate-release (ROXICODONE) 5 MG Tab Take 1 Tablet by mouth every four hours as needed for Severe Pain for up to 3 days. (Patient not taking: Reported on 12/18/2023) 18 Tablet 0     No current facility-administered medications on file prior to visit.      Allergies: Patient has no known allergies.   Social Hx:   Social History     Socioeconomic History    Marital status: Single     Spouse name: Not on file    Number of children: Not on file    Years of education: Not on file    Highest education level: Not on file   Occupational History    Not on file   Tobacco Use    Smoking status: Former     Current packs/day: 0.00     Average packs/day: 1 pack/day for 15.0 years (15.0 ttl pk-yrs)     Types: Cigarettes     Start date: 1/1/1993     Quit date: 1/1/2008     Years since quitting: 15.9    Smokeless tobacco: Never   Vaping Use    Vaping Use: Never used   Substance and Sexual Activity    Alcohol use: No    Drug use: No    Sexual activity: Not on file   Other Topics Concern    Not on file   Social History Narrative    Not on file     Social Determinants of Health     Financial Resource Strain: Not on file   Food Insecurity: Not on file   Transportation Needs: Not on file   Physical Activity: Not on file   Stress: Not on file   Social Connections: Not on file   Intimate Partner Violence: Not on file   Housing Stability: Not on file        OBJECTIVE: 2 piece 70mm ctf with paste ring intact with minimal wear time on ring after 3 days    STOMA ASSESSMENT:   Type:  ____Ileostomy     __x__Colostomy    ____Urostomy    ____Other     Location:  LLQ    Size: 1 1/2\"   Shape: round   Color: moist red   Protrudes:      _x__ >1 inch               ___ <1 inch   [place]:  central   Mucocutaneous Junction:  intact with sutures, medial aspect with scant resolving " "area   Skin indentations, creases or scar tissue: when sitting in scant bowl   Danielle-stomal Skin Problems: none   Effluent / Flatus: soft brown   Photo  ___x_ Yes ____ No      Laying flat      Sitting    Pouching Procedure:   Old appliance removed with medical adhesive remover.   Peristomal skin cleansed with: water & gauze   Peristomal skin treated with: stoma powder & no sting skin prep crusting to medial MCJ   Ostomy appliance used:  2\" slim paste ring (4315) wrapped around stoma, 2 piece ctf flat 57 (17785) & matching pouch (97805)     Negative Pressure Wound Therapy 12/08/23 Abdomen (Active)   NPWT Pump Mode / Pressure Setting 125 mmHg;Continuous 12/18/23 1355   Dressing Type Small;Black Foam (Regular) 12/18/23 1355   Number of Foam Pieces Used 2 12/18/23 1355   Canister Changed Yes 12/18/23 1355           Wound 12/08/23 Full Thickness Wound Open Incision Abdomen (Active)   Wound Image   12/18/23 1355   Site Assessment Red;Granulation tissue 12/18/23 1355   Periwound Assessment Scar tissue 12/18/23 1355   Margins Attached edges;Defined edges 12/18/23 1355   Closure Secondary intention 12/18/23 1355   Drainage Amount Large 12/18/23 1355   Drainage Description Serosanguineous 12/18/23 1355   Treatments Cleansed 12/18/23 1355   Wound Cleansing Hypochlorus Acid 12/18/23 1355   Periwound Protectant No-sting Skin Prep;Drape 12/18/23 1355   Dressing Changed Changed 12/18/23 1355   Dressing Cleansing/Solutions Not Applicable 12/18/23 1355   Dressing Options Dry Gauze;Wound Vac 12/18/23 1355   Dressing Change/Treatment Frequency Monday, Wednesday, Friday, and As Needed 12/18/23 1355   Wound Team Following 3x Weekly 12/18/23 1355   Non-staged Wound Description Full thickness 12/18/23 1355   Wound Length (cm) 23.2 cm 12/18/23 1355   Wound Width (cm) 3.8 cm 12/18/23 1355   Wound Depth (cm) 2.4 cm 12/18/23 1355   Wound Surface Area (cm^2) 88.16 cm^2 12/18/23 1355   Wound Volume (cm^3) 211.584 cm^3 12/18/23 1355 "   Post-Procedure Length (cm) 23.2 cm 12/18/23 1355   Post-Procedure Width (cm) 3.8 cm 12/18/23 1355   Post-Procedure Depth (cm) 2.4 cm 12/18/23 1355   Post-Procedure Surface Area (cm^2) 88.16 cm^2 12/18/23 1355   Post-Procedure Volume (cm^3) 211.584 cm^3 12/18/23 1355   Wound Healing % 24 12/18/23 1355   Wound Bed Granulation (%) 100 % 12/18/23 1355   Tunneling (cm) 0 cm 12/18/23 1355   Undermining (cm) 0 cm 12/18/23 1355   Wound Odor None 12/18/23 1355   Exposed Structures None 12/18/23 1355     Removed all black from by CNA, no foam retained. Applied 1 piece of black foam to wound bed and 1 piece black foam as button topper. Sealed with drape to 125 mmHg continuous, no leaks noted. Cannister changed & sealed confirmed.         Patient Education: Change colostomies every 5-7 days. Change appliance immediately if it is leaking or peristomal skin feels irritated, has itching, or  burning.   To change the appliance, remove previous appliance, cleanse peristomal skin with warm water/washcloth, pat dry, make an ostomy template or use cardboard measuring guide and trace ostomy shape onto back of barrier, cut out barrier, apply a paste ring around barrier opening and apply appliance. Empty pouches when no more than ½ full. Check contents every 2 hours or as needed. Do not leave soap residue on tissue and do not use baby wipes or skin prep wipes.       Wound VAC at 125mmHg continuous or intermittent with black foam. Dressing will be changed every MWF at the wound clinic or with your home health nurse.  If you are having issues with your wound VAC, please consider patching leaks, changing the canister, or calling [phone] for troubleshooting. If the wound VAC has been off or un-operational for over 2 hours, call wound care center to inform them and remove all dressings including black foam and replace with normal saline damp gauze. Educated on how to change cannister as well.    Should you experience any significant  changes in your wound(s), such as infection (redness, swelling, localized heat, increased pain, fever > 101 F, chills) or have any questions regarding your home care instructions, please contact the wound center at [phone]. If after hours, contact your primary care physician or go to the hospital emergency room.   Keep dressing clean, dry and cover when bathing. Only change dressing if it's over saturated, soiled or falls off.    Eat protein, stay hydrated, get rest for wound healing. Do no lift anything more than 10 pounds, but you can go for walks. Call [facility] to make a follow-up with your surgeon.   Verbal/demonstration instructions of above pouching procedure provided to patient/family.      Response:       PLAN: return 3x/week for NPWT; smaller appliance used to try to keep ostomy change separate from wound. At next appointment have patient practice changing ostomy appliance independently while standing; and if he likes the 57 mm, can order supplies.    Supplies Needed:   Appliance type:    [place] as noted above             Other:      Accessories:         Supplier: to be decided, possibly [employer]    Frequency:  3x/week for NPWT. Patient & clinic goal to make patient independent in his own ostomy care.      Professional Collaboration:  Evaluation notes forwarded to referring provider.      At the time of each visit, a thorough assessment of the patient is completed to assure appropriateness of our plan of care.  The plan of care may need to be adapted from the original plan of care to address any significant changes in patient status.    Clinician Signature:  _________________________________  Date:  ____________    Physician Signature:  ________________________________  Date:  ____________

## 2023-12-18 NOTE — PROGRESS NOTES
Brief provider note:     Asked to evaluate pt by wound RN, in regards to staples to lap sites. POD # 10.  Lap sites healed, staples causing irritation to skin.     Plan  Okay to remove staples  Follow-up with Dr. Caal for post op follow up      My total time spent caring for the patient on the day of the encounter was 10 minutes.   This does not include time spent on separately billable procedures/tests.

## 2023-12-20 ENCOUNTER — NON-PROVIDER VISIT (OUTPATIENT)
Dept: WOUND CARE | Facility: MEDICAL CENTER | Age: 48
End: 2023-12-20
Attending: SURGERY
Payer: COMMERCIAL

## 2023-12-20 PROCEDURE — 97605 NEG PRS WND THER DME<=50SQCM: CPT

## 2023-12-20 PROCEDURE — 99211 OFF/OP EST MAY X REQ PHY/QHP: CPT

## 2023-12-20 NOTE — PATIENT INSTRUCTIONS
-Keep your wound dressing clean, dry, and intact.    -Wound vac may not have any drainage in tube or cannister & it will still be working.   Change cannister if it does become full by pressing tab on side of machine to remove canister and snap on new one. Full canister can be thrown in the trash. If cannister fills with bright red blood - go to ER. Dressing will be changed every MWF at the wound clinic.  If you are having issues with your wound VAC, please consider patching leaks, changing the canister, or calling [phone] for troubleshooting. If the wound VAC has been off or un-operational for over 2 hours, call wound care center to inform them and remove all dressings including black foam and replace with normal saline damp gauze.    Change colostomies every 5-7 days. Change appliance immediately if it is leaking or peristomal skin feels irritated, has itching, or  burning.   To change the appliance, remove previous appliance, cleanse peristomal skin with warm water/washcloth, pat dry, make an ostomy template or use cardboard measuring guide and trace ostomy shape onto back of barrier, cut out barrier, apply a paste ring around barrier opening and apply appliance. Empty pouches when no more than ½ full. Check contents every 2 hours or as needed. Do not leave soap residue on tissue and do not use baby wipes or skin prep wipes.     Should you experience any significant changes in your condition, such as infection (redness, swelling, localized heat, increased pain, fever > 101 F, chills) or have any questions regarding your home care instructions, please contact the wound center at [phone]. If after hours, contact your primary care physician or go to the hospital emergency room.

## 2023-12-20 NOTE — WOUND TEAM
"Advanced Wound Care  [facility]  [address]  [place] [address]  [phone] Fax#    Ostomy Assessment  For 90 Day Certification Period:  12/18/23-3/18/24    Referring Provider:  Ariel Caal M.D   Primary Provider: Karan Blair D.O  Consulting Providers: LINDA Villalpando  Surgeon: Ariel Caal M.D     Start of Care: 12/18/23  Reason for referral: new colostomy & midline wound    SUBJECTIVE:  \"It's going pretty good.\"    HPI: 48 year old male presents with new colostomy and midline abdominal wound. On 12/8/23, patient presented to the ER with severe abdominal pain that started that morning and increased after vomiting. Patient was found to have free intraperitoneal air. That same day, Dr. Caal preformed a Sigmoid colon resection with end colostomy and application of midline abdominal wound NPWT due to Perforated sigmoid colon & feculent peritonitis. Patient was seen during his hospital stay by the IP wound team for NPWT changes and ostomy education.     Past Medical Hx:   Past Medical History:   Diagnosis Date    Cancer (HCC)       Surgical Hx:   Past Surgical History:   Procedure Laterality Date    [place] LAP,DIAGNOSTIC ABDOMEN  12/8/2023    Procedure: SIGMOID COLECTOMY WITH COLOSTOMY CREATION;  Surgeon: Ariel Caal M.D.;  Location: SURGERY [facility];  Service: General    TONSILLECTOMY  2/6/2015    Performed by Fernando Fair M.D. at [facility]ERY [facility]    LARYNGOSCOPY  2/6/2015    Performed by Fernando Fair M.D. at [facility]ERY [facility]    LYMPH NODE EXCISION  1/30/2015    Performed by Fernando Fair M.D. at [facility]ERY [facility]      Medications:   Current Outpatient Medications on File Prior to Visit   Medication Sig Dispense Refill    acetaminophen (TYLENOL) 500 MG Tab Take 2 Tablets by mouth every 6 hours as needed for Moderate Pain. Take as directed on the bottle.  Take as needed for pain 30 Tablet 0    " "levothyroxine (SYNTHROID) 150 MCG Tab Take 150 mcg by mouth at bedtime.       No current facility-administered medications on file prior to visit.      Allergies: Patient has no known allergies.   Social Hx:   Social History     Socioeconomic History    Marital status: Single     Spouse name: Not on file    Number of children: Not on file    Years of education: Not on file    Highest education level: Not on file   Occupational History    Not on file   Tobacco Use    Smoking status: Former     Current packs/day: 0.00     Average packs/day: 1 pack/day for 15.0 years (15.0 ttl pk-yrs)     Types: Cigarettes     Start date: 1/1/1993     Quit date: 1/1/2008     Years since quitting: 15.9    Smokeless tobacco: Never   Vaping Use    Vaping Use: Never used   Substance and Sexual Activity    Alcohol use: No    Drug use: No    Sexual activity: Not on file   Other Topics Concern    Not on file   Social History Narrative    Not on file     Social Determinants of Health     Financial Resource Strain: Not on file   Food Insecurity: Not on file   Transportation Needs: Not on file   Physical Activity: Not on file   Stress: Not on file   Social Connections: Not on file   Intimate Partner Violence: Not on file   Housing Stability: Not on file        OBJECTIVE: 2 piece 57mm flat ctf with paste ring intact with minimal wear on ring after 2 days    STOMA ASSESSMENT:   Type:  ____Ileostomy     __x__Colostomy    ____Urostomy    ____Other     Location:  LLQ    Size: 1 1/2\"   Shape: round   Color: moist red   Protrudes:      _x__ >1 inch               ___ <1 inch   [place]:  central   Mucocutaneous Junction:  intact with sutures, medial aspect with scant resolving area   Skin indentations, creases or scar tissue: when sitting in scant bowl   Danielle-stomal Skin Problems: none   Effluent / Flatus: soft brown   Photo  ____ Yes __X_ No (photo below from last visit)      Laying flat      Sitting    Pouching Procedure:  Pt attempted entire appliance " "change today.   Set up necessary supplies (RN)  Removed old appliance with medical adhesive remover (patient)  Cleansed peristomal skin with water/washcloth/gauze (patient with RN assist)  Treated medial MCJ separation with crusting (stoma powder and no sting skin prep). (Patient with RN demonstration first)  Measured stoma (patient with RN assist)  Cut barrier (RN)  Applied paste ring to barrier (patient)  Folded dog ears (patient with RN reminder to do so)  Applied barrier to skin (patient)  Readied pouch (folded opening, instilled lubricating deodorant) (pt and RN)  Connected barrier and pouch (pt with RN assist)  PRODUCTS USED: 2\" slim MI (8815), 2 pc flat CTF 57mm barrier (83745), corresponding pouch (91156)           Patient Education: Change colostomies every 5-7 days. Change appliance immediately if it is leaking or peristomal skin feels irritated, has itching, or  burning.   To change the appliance, remove previous appliance, cleanse peristomal skin with warm water/washcloth, pat dry, make an ostomy template or use cardboard measuring guide and trace ostomy shape onto back of barrier, cut out barrier, apply a paste ring around barrier opening and apply appliance. Empty pouches when no more than ½ full. Check contents every 2 hours or as needed. Do not leave soap residue on tissue and do not use baby wipes or skin prep wipes.     Should you experience any significant changes in your wound(s), such as infection (redness, swelling, localized heat, increased pain, fever > 101 F, chills) or have any questions regarding your home care instructions, please contact the wound center at [phone]. If after hours, contact your primary care physician or go to the hospital emergency room.   Keep dressing clean, dry and cover when bathing. Only change dressing if it's over saturated, soiled or falls off.    Eat protein, stay hydrated, get rest for wound healing. Do no lift anything more than 10 pounds, but you can " go for walks. Call [facility] to make a follow-up with your surgeon.   Verbal/demonstration instructions of above pouching procedure provided to patient/family.      Response:       PLAN: return 3x/week for NPWT (except will be 2x/wk next week due to Lucy holiday); smaller appliance used again, worked well to keep ostomy change separate from wound. At next appointment have patient practice changing ostomy appliance independently while standing; and if he likes the 57 mm, can order supplies.     Supplies Needed:   Appliance type:    [place] as noted above             Other:      Accessories:         Supplier: to be decided, possibly [employer]    Frequency:  3x/week for NPWT. Patient & clinic goal to make patient independent in his own ostomy care.      Professional Collaboration: Ayah Rios RN      At the time of each visit, a thorough assessment of the patient is completed to assure appropriateness of our plan of care.  The plan of care may need to be adapted from the original plan of care to address any significant changes in patient status.    Clinician Signature:  _________________________________  Date:  ____________    Physician Signature:  ________________________________  Date:  ____________

## 2023-12-22 ENCOUNTER — OFFICE VISIT (OUTPATIENT)
Dept: WOUND CARE | Facility: MEDICAL CENTER | Age: 48
End: 2023-12-22
Attending: SURGERY
Payer: COMMERCIAL

## 2023-12-22 VITALS
HEART RATE: 75 BPM | OXYGEN SATURATION: 97 % | TEMPERATURE: 96.6 F | SYSTOLIC BLOOD PRESSURE: 117 MMHG | RESPIRATION RATE: 18 BRPM | DIASTOLIC BLOOD PRESSURE: 68 MMHG

## 2023-12-22 DIAGNOSIS — Z43.3 COLOSTOMY CARE (HCC): ICD-10-CM

## 2023-12-22 DIAGNOSIS — S31.109D OPEN WOUND OF ABDOMEN, SUBSEQUENT ENCOUNTER: ICD-10-CM

## 2023-12-22 PROCEDURE — 97606 NEG PRS WND THER DME>50 SQCM: CPT

## 2023-12-22 PROCEDURE — 3074F SYST BP LT 130 MM HG: CPT | Performed by: NURSE PRACTITIONER

## 2023-12-22 PROCEDURE — 3078F DIAST BP <80 MM HG: CPT | Performed by: NURSE PRACTITIONER

## 2023-12-22 PROCEDURE — 99214 OFFICE O/P EST MOD 30 MIN: CPT

## 2023-12-22 PROCEDURE — 11042 DBRDMT SUBQ TIS 1ST 20SQCM/<: CPT | Performed by: NURSE PRACTITIONER

## 2023-12-22 PROCEDURE — 11042 DBRDMT SUBQ TIS 1ST 20SQCM/<: CPT

## 2023-12-22 PROCEDURE — 99213 OFFICE O/P EST LOW 20 MIN: CPT | Mod: 25 | Performed by: NURSE PRACTITIONER

## 2023-12-22 ASSESSMENT — ENCOUNTER SYMPTOMS
FEVER: 0
DIAPHORESIS: 0
BACK PAIN: 0
CHILLS: 0
EYES NEGATIVE: 1
COUGH: 0
FALLS: 0
PALPITATIONS: 0
WHEEZING: 0
VOMITING: 0
WEAKNESS: 0
BLOOD IN STOOL: 0
NERVOUS/ANXIOUS: 0
CLAUDICATION: 0
DEPRESSION: 0
NAUSEA: 0
SHORTNESS OF BREATH: 0
ABDOMINAL PAIN: 1

## 2023-12-22 NOTE — PROGRESS NOTES
NPWT > 50 sq cm dressing changed this visit, 3 pieces of foam removed. 2 new piece of black foam placed to wound bed with an additional piece of black foam used for tracpad. Pump set to 125 mmhg, continuous suction. No leaks detected. Refer to flow sheet for wound care details. Patient tolerated the procedure well.

## 2023-12-22 NOTE — PATIENT INSTRUCTIONS
-Keep your wound dressing clean, dry, and intact.    -Change your dressing if it becomes soiled, soaked, or falls off.    -Change colostomies every 5-7 days. Change appliance immediately if it is leaking or peristomal skin feels irritated, has itching, or  burning. To change the appliance, remove previous appliance, cleanse peristomal skin with warm water/washcloth, pat dry, make an ostomy template or use cardboard measuring guide and trace ostomy shape onto back of barrier, cut out barrier, apply a paste ring around barrier opening and apply appliance. Empty pouches when no more than ½ full. Check contents every 2 hours or as needed. Do not leave soap residue on tissue and do not use baby wipes or skin prep wipes.     -Wound vac may not have any drainage in tube or cannister & it will still be working.   Change cannister if it does become full by pressing tab on side of machine to remove canister and snap on new one. Full canister can be thrown in the trash. If cannister fills with bright red blood - go to ER. Dressing will be changed every MWF at the wound clini.  If you are having issues with your wound VAC, please consider patching leaks, changing the canister, or calling [phone] for troubleshooting. If the wound VAC has been off or un-operational for over 2 hours, call wound care center to inform them and remove all dressings including black foam and replace with normal saline damp gauze.     -Should you experience any significant changes in your wound(s), such as infection (redness, swelling, localized heat, increased pain, fever > 101 F, chills) or have any questions regarding your home care instructions, please contact the wound center at [phone]. If after hours, contact your primary care physician or go to the hospital emergency room.

## 2023-12-22 NOTE — WOUND TEAM
"Advanced Wound Care  [facility]  [address]  [place] [address]  [phone] Fax#    Ostomy Assessment  For 90 Day Certification Period:  12/18/23-3/18/24    Referring Provider:  Ariel Caal M.D   Primary Provider: Karan Blair D.O  Consulting Providers: LINDA Villalpando  Surgeon: Ariel Caal M.D     Start of Care: 12/18/23  Reason for referral: new colostomy & midline wound    SUBJECTIVE:  \"Everything is going well, I didn't have any leaks.\"    HPI: 48 year old male presents with new colostomy and midline abdominal wound. On 12/8/23, patient presented to the ER with severe abdominal pain that started that morning and increased after vomiting. Patient was found to have free intraperitoneal air. That same day, Dr. Caal preformed a Sigmoid colon resection with end colostomy and application of midline abdominal wound NPWT due to Perforated sigmoid colon & feculent peritonitis. Patient was seen during his hospital stay by the IP wound team for NPWT changes and ostomy education.     Past Medical Hx:   Past Medical History:   Diagnosis Date    Cancer (HCC)       Surgical Hx:   Past Surgical History:   Procedure Laterality Date    DE LAP,DIAGNOSTIC ABDOMEN  12/8/2023    Procedure: SIGMOID COLECTOMY WITH COLOSTOMY CREATION;  Surgeon: Ariel Caal M.D.;  Location: SURGERY [facility];  Service: General    TONSILLECTOMY  2/6/2015    Performed by Fernando Fair M.D. at SURGERY SAME DAY [facility] ORS    LARYNGOSCOPY  2/6/2015    Performed by Fernando Fair M.D. at SURGERY SAME DAY [facility] ORS    LYMPH NODE EXCISION  1/30/2015    Performed by Fernando Fair M.D. at SURGERY SAME DAY [facility] ORS      Medications:   Current Outpatient Medications on File Prior to Visit   Medication Sig Dispense Refill    acetaminophen (TYLENOL) 500 MG Tab Take 2 Tablets by mouth every 6 hours as needed for Moderate Pain. Take as directed on the bottle.  Take as needed for " pain 30 Tablet 0    levothyroxine (SYNTHROID) 150 MCG Tab Take 150 mcg by mouth at bedtime.       No current facility-administered medications on file prior to visit.      Allergies: Patient has no known allergies.   Social Hx:   Social History     Socioeconomic History    Marital status: Single     Spouse name: Not on file    Number of children: Not on file    Years of education: Not on file    Highest education level: Not on file   Occupational History    Not on file   Tobacco Use    Smoking status: Former     Current packs/day: 0.00     Average packs/day: 1 pack/day for 15.0 years (15.0 ttl pk-yrs)     Types: Cigarettes     Start date: 1/1/1993     Quit date: 1/1/2008     Years since quitting: 15.9    Smokeless tobacco: Never   Vaping Use    Vaping Use: Never used   Substance and Sexual Activity    Alcohol use: No    Drug use: No    Sexual activity: Not on file   Other Topics Concern    Not on file   Social History Narrative    Not on file     Social Determinants of Health     Financial Resource Strain: Not on file   Food Insecurity: Not on file   Transportation Needs: Not on file   Physical Activity: Not on file   Stress: Not on file   Social Connections: Not on file   Intimate Partner Violence: Not on file   Housing Stability: Not on file      Wound Assessment:     Negative Pressure Wound Therapy 12/08/23 Abdomen (Active)   NPWT Pump Mode / Pressure Setting Continuous;125 mmHg 12/22/23 0845   Dressing Type Black Foam (Regular) 12/22/23 0845   Number of Foam Pieces Used 3 12/22/23 0845   Canister Changed No 12/22/23 0845     Wound 12/08/23 Full Thickness Wound Open Incision Abdomen (Active)   Wound Image    12/22/23 0845   Site Assessment Red;Granulation tissue;Yellow 12/22/23 0845   Periwound Assessment Intact;Dry;Scar tissue 12/22/23 0845   Margins Unattached edges 12/22/23 0845   Closure Secondary intention 12/22/23 0845   Drainage Amount Large 12/22/23 0845   Drainage Description Serosanguineous 12/22/23  "0845   Treatments Cleansed;Topical Lidocaine;Provider debridement;Site care 12/22/23 0845   Wound Cleansing Hypochlorus Acid 12/22/23 0845   Periwound Protectant Skin Protectant Wipes to Periwound;Drape 12/22/23 0845   Dressing Changed Changed 12/22/23 0845   Dressing Cleansing/Solutions Not Applicable 12/22/23 0845   Dressing Options Wound Vac 12/22/23 0845   Dressing Change/Treatment Frequency Monday, Wednesday, Friday, and As Needed 12/22/23 0845   Wound Team Following 3x Weekly 12/22/23 0845   Non-staged Wound Description Full thickness 12/22/23 0845   Wound Length (cm) 21.5 cm 12/22/23 0845   Wound Width (cm) 3 cm 12/22/23 0845   Wound Depth (cm) 2.6 cm 12/22/23 0845   Wound Surface Area (cm^2) 64.5 cm^2 12/22/23 0845   Wound Volume (cm^3) 167.7 cm^3 12/22/23 0845   Post-Procedure Length (cm) 21.5 cm 12/22/23 0845   Post-Procedure Width (cm) 3 cm 12/22/23 0845   Post-Procedure Depth (cm) 2.6 cm 12/22/23 0845   Post-Procedure Surface Area (cm^2) 64.5 cm^2 12/22/23 0845   Post-Procedure Volume (cm^3) 167.7 cm^3 12/22/23 0845   Wound Healing % 40 12/22/23 0845   Wound Bed Granulation (%) 100 % 12/18/23 1355   Tunneling (cm) 0 cm 12/22/23 0845   Undermining (cm) 0 cm 12/22/23 0845   Wound Odor None 12/22/23 0845   Exposed Structures None 12/22/23 0845             OBJECTIVE: 2 piece 57mm flat ctf with paste ring intact with minimal wear on ring after 2 days    STOMA ASSESSMENT:   Type:  ____Ileostomy     __x__Colostomy    ____Urostomy    ____Other     Location:  LLQ    Size: 1 1/2\"   Shape: round   Color: moist red   Protrudes:      _x__ >1 inch               ___ <1 inch   [place]:  central   Mucocutaneous Junction:  intact with sutures, mild separation.    Skin indentations, creases or scar tissue: when sitting in scant bowl   Danielle-stomal Skin Problems: none   Effluent / Flatus: soft brown   Photo  __X__ Yes ____ No         Pouching Procedure:  Pt attempted entire appliance change today.   Set up necessary supplies " "(RN)  Removed old appliance with medical adhesive remover (patient)  Cleansed peristomal skin with water/washcloth/gauze (RN)  Treated medial MCJ separation with crusting (stoma powder and no sting skin prep). (Patient with RN demonstration first)  Measured stoma (RN)  Cut barrier (Patient)  Applied paste ring to barrier (RN)  Folded dog ears (patient with RN reminder to do so)  Applied barrier to skin (patient)  Readied pouch (folded opening, instilled lubricating deodorant) (pt and RN)  Connected barrier and pouch (pt with RN assist)  PRODUCTS USED: 2\" slim FL (8815), 2 pc flat CTF 57mm barrier (08293), corresponding pouch (34809)    Patient Education: Change colostomies every 5-7 days. Change appliance immediately if it is leaking or peristomal skin feels irritated, has itching, or  burning.   To change the appliance, remove previous appliance, cleanse peristomal skin with warm water/washcloth, pat dry, make an ostomy template or use cardboard measuring guide and trace ostomy shape onto back of barrier, cut out barrier, apply a paste ring around barrier opening and apply appliance. Empty pouches when no more than ½ full. Check contents every 2 hours or as needed. Do not leave soap residue on tissue and do not use baby wipes or skin prep wipes.     Should you experience any significant changes in your wound(s), such as infection (redness, swelling, localized heat, increased pain, fever > 101 F, chills) or have any questions regarding your home care instructions, please contact the wound center at [phone]. If after hours, contact your primary care physician or go to the hospital emergency room.   Keep dressing clean, dry and cover when bathing. Only change dressing if it's over saturated, soiled or falls off.    Eat protein, stay hydrated, get rest for wound healing. Do no lift anything more than 10 pounds, but you can go for walks. Call [facility] to make a follow-up with your surgeon. "   Verbal/demonstration instructions of above pouching procedure provided to patient/family.      Response:     PLAN: return 3x/week for NPWT (except will be 2x/wk next week due to Christmas holiday); smaller appliance used again, worked well to keep ostomy change separate from wound. At next appointment have patient practice changing ostomy appliance independently while standing; and if he likes the 57 mm, can order supplies.     Supplies Needed:   Appliance type:    [place] as noted above             Other:      Accessories:         Supplier: to be decided, possibly [employer]    Frequency:  3x/week for NPWT. Patient & clinic goal to make patient independent in his own ostomy care.      Professional Collaboration: LINDA Villalpando    At the time of each visit, a thorough assessment of the patient is completed to assure appropriateness of our plan of care.  The plan of care may need to be adapted from the original plan of care to address any significant changes in patient status.    Clinician Signature:  _________________________________  Date:  ____________    Physician Signature:  ________________________________  Date:  ____________

## 2023-12-22 NOTE — PROGRESS NOTES
Provider Encounter- Full Thickness wound    HISTORY OF PRESENT ILLNESS  Wound History:    START OF CARE IN CLINIC: 12/18/23    REFERRING PROVIDER: Ariel Caal MD     WOUND- Full Thickness Wound   LOCATION: midline abdomen   Ostomy: End colostomy LLQ  SURGICAL HISTORY: Sigmoid colon resection with end colostomy, VAC placement on 12/8/2023 by Dr. Caal   HISTORY: Had severe abdominal pain with nausea and vomiting.  Presented to ED 12/8/2023 and was admitted until 12/15/2023 for pneumoperitoneum and perforated sigmoid colon as seen on CT.  Underwent surgery listed above.  Was on Zosyn for 4 days for positive peritoneal culture of E. coli.  He was seen by wound team for VAC changes and ostomy care.  He tolerated diet, colostomy was functioning and patient discharged to home with referral to outpatient wound care clinic for both treatment of his wound and ostomy.    Pertinent Medical History: History of cancer to neck and throat s/p chemo and radiation, followed by surgical removal of tumor.  Was informed he is cancer free..     TOBACCO USE:  former. Stopped smoking 20years ago  No alcohol use  No drug use  Occupation:     Patient's problem list, allergies, and current medications reviewed and updated in Epic    Interval History:  12/22/23: Initial provider visit for VAC/ostomy. Clinic visit with Kathe MCKEON, CECILIO-BC, YAA, DERIAN.  Pt denies fevers, chills, nausea, vomiting.   No leaks from ostomy appliance.  Appliance being changed 3 times a week at VAC change appointments.  Patient beginning hands-on teaching.  He is emptying his appliance about 2-3 times per day.  Does have brown soft stool. patient is eating regular diet.  Ambulating without difficulty.  Currently taking time off of work.  Patient has follow-up with general surgery APRN on 12/26/2023.  Currently not on antibiotics.        REVIEW OF SYSTEMS:   Review of Systems   Constitutional:  Negative for chills, diaphoresis and  "fever.   HENT: Negative.     Eyes: Negative.    Respiratory:  Negative for cough, shortness of breath and wheezing.    Cardiovascular:  Negative for chest pain, palpitations, claudication and leg swelling.   Gastrointestinal:  Positive for abdominal pain. Negative for blood in stool, nausea and vomiting.        Cough/ sneeze   Genitourinary: Negative.    Musculoskeletal:  Negative for back pain, falls and joint pain.   Skin:  Negative for itching and rash.        Abdominal wound, lap sites healed x3, healed rk drain site   Neurological:  Negative for weakness.   Psychiatric/Behavioral:  Negative for depression. The patient is not nervous/anxious.        PHYSICAL EXAMINATION:   /68   Pulse 75   Temp 35.9 °C (96.6 °F) (Temporal)   Resp 18   SpO2 97%     Physical Exam  Vitals reviewed.   Constitutional:       Appearance: Normal appearance.   Cardiovascular:      Rate and Rhythm: Normal rate.   Abdominal:      General: Bowel sounds are normal.      Palpations: Abdomen is soft.      Tenderness: There is abdominal tenderness.      Comments: Normal post op surgical  tenderness    LLQ colostomy: stoma red, os center, >1\", round, 1.5\" diameter, peristomal skin intact, separation 9:00 0.2cm deep   Skin:     Comments: Midline abdomen: red granular, decreased size, moderate heavy serosang drainage. No overt signs of infection   Neurological:      Mental Status: He is alert.         WOUND ASSESSMENT  Wound 12/08/23 Full Thickness Wound Open Incision Abdomen (Active)   Wound Image    12/22/23 0845   Site Assessment Red;Granulation tissue;Yellow 12/22/23 0845   Periwound Assessment Intact;Dry;Scar tissue 12/22/23 0845   Margins Unattached edges 12/22/23 0845   Closure Secondary intention 12/22/23 0845   Drainage Amount Large 12/22/23 0845   Drainage Description Serosanguineous 12/22/23 0845   Treatments Cleansed;Topical Lidocaine;Provider debridement;Site care 12/22/23 0845   Wound Cleansing Hypochlorus Acid 12/22/23 " "0845   Periwound Protectant Skin Protectant Wipes to Periwound;Drape 12/22/23 0845   Dressing Changed Changed 12/22/23 0845   Dressing Cleansing/Solutions Not Applicable 12/22/23 0845   Dressing Options Wound Vac 12/22/23 0845   Dressing Change/Treatment Frequency Monday, Wednesday, Friday, and As Needed 12/22/23 0845   Wound Team Following 3x Weekly 12/22/23 0845   Non-staged Wound Description Full thickness 12/22/23 0845   Wound Length (cm) 21.5 cm 12/22/23 0845   Wound Width (cm) 3 cm 12/22/23 0845   Wound Depth (cm) 2.6 cm 12/22/23 0845   Wound Surface Area (cm^2) 64.5 cm^2 12/22/23 0845   Wound Volume (cm^3) 167.7 cm^3 12/22/23 0845   Post-Procedure Length (cm) 21.5 cm 12/22/23 0845   Post-Procedure Width (cm) 3 cm 12/22/23 0845   Post-Procedure Depth (cm) 2.6 cm 12/22/23 0845   Post-Procedure Surface Area (cm^2) 64.5 cm^2 12/22/23 0845   Post-Procedure Volume (cm^3) 167.7 cm^3 12/22/23 0845   Wound Healing % 40 12/22/23 0845   Wound Bed Granulation (%) 100 % 12/18/23 1355   Tunneling (cm) 0 cm 12/22/23 0845   Undermining (cm) 0 cm 12/22/23 0845   Wound Odor None 12/22/23 0845   Exposed Structures None 12/22/23 0845   Number of days: 15       PROCEDURE:   -2% viscous lidocaine applied topically to wound bed for approximately 5 minutes prior to debridement  -Curette used to debride wound bed.  Excisional debridement was performed to remove devitalized tissue until healthy, bleeding tissue was visualized.   Entire surface of wound, 4cm2 debrided.  Tissue debrided into the subcutaneous layer.    -Bleeding controlled with manual pressure.    -Wound care completed by wound RN, refer to flowsheet  -Patient tolerated the procedure well, without c/o pain or discomfort.       Pertinent Labs and Diagnostics:    Labs:     A1c: No results found for: \"HBA1C\"       IMAGING: CT preop 12/8/2023  1. Moderate free intraperitoneal air, concerning for perforated viscus. There is free fluid surrounding the liver and in bilateral " lower quadrant as well. The exact location of the perforation is unknown.   2. Significantly dilated rectum with large amount of stool and decompress sigmoid colon. This could relate to stercoral colitis and could be a potential area for perforation.   3. A 3.7 cm right adrenal nodule with peripheral calcified wall, nonspecific but could be a pseudocyst or chronic hematoma.    VASCULAR STUDIES: N/A    LAST  WOUND CULTURE:  DATE :   Lab Results   Component Value Date/Time    CULTRSULT - (A) 12/08/2023 07:19 PM    CULTRSULT Escherichia coli  Rare growth   (A) 12/08/2023 07:19 PM    CULTRSULT - (A) 12/08/2023 07:19 PM    CULTRSULT Bacteroides stercoris group  Light growth   (A) 12/08/2023 07:19 PM    CULTRSULT Bacteroides thetaiotaomicron group  Light growth   (A) 12/08/2023 07:19 PM         ASSESSMENT AND PLAN:     1. Colostomy care (HCC)  12/22/2023: Beginning hands-on training.  There is a separation at 9:00 approximately 0.2 cm deep  -Colostomy temporary.  Has follow-up with surgery 12/26/2023.  -Care and additional education provided by ostomy RN    Ostomy care: Stoma powder to separation, 2 inch slim paste ring, 2 piece flat cut to fit 57 mm barrier and pouch    3. Open wound of abdomen, subsequent encounter  12/22/2023: POD #14.  Abdominal wound with majority of tissue Red granular, minimal slough at proximal aspect, necrotic adipose.  - Excisional debridement performed today.  Medically necessary to promote wound healing.  -Patient to return to wound clinic 3 times weekly for assessment, possible debridement, VAC changes  -Lap sites remain healed.  Staples removed 12/18/2023    Wound care: VAC at -125 mmHg continuous            PATIENT EDUCATION  - Importance of adequate nutrition for wound healing  -Advised to go to ER for any increased redness, swelling, drainage, or odor, or if patient develops fever, chills, nausea or vomiting.     My total time spent caring for the patient on the day of the encounter was  30 minutes.   This does not include time spent on separately billable procedures/tests.       Please note that this note may have been created using voice recognition software. I have worked with technical experts from [facility] to optimize the interface.  I have made every reasonable attempt to correct obvious errors, but there may be errors of grammar and possibly content that I did not discover before finalizing the note.    N

## 2023-12-26 ENCOUNTER — NON-PROVIDER VISIT (OUTPATIENT)
Dept: WOUND CARE | Facility: MEDICAL CENTER | Age: 48
End: 2023-12-26
Attending: SURGERY
Payer: COMMERCIAL

## 2023-12-26 ENCOUNTER — OFFICE VISIT (OUTPATIENT)
Dept: SURGERY | Facility: MEDICAL CENTER | Age: 48
End: 2023-12-26
Attending: SURGERY
Payer: COMMERCIAL

## 2023-12-26 VITALS
RESPIRATION RATE: 16 BRPM | OXYGEN SATURATION: 95 % | HEART RATE: 78 BPM | HEIGHT: 72 IN | WEIGHT: 209 LBS | BODY MASS INDEX: 28.31 KG/M2 | DIASTOLIC BLOOD PRESSURE: 76 MMHG | SYSTOLIC BLOOD PRESSURE: 140 MMHG

## 2023-12-26 DIAGNOSIS — K63.1 PERFORATION OF SIGMOID COLON (HCC): ICD-10-CM

## 2023-12-26 DIAGNOSIS — Z98.890 STATUS POST EXPLORATORY LAPAROTOMY: ICD-10-CM

## 2023-12-26 PROCEDURE — 3078F DIAST BP <80 MM HG: CPT | Performed by: SURGERY

## 2023-12-26 PROCEDURE — 3077F SYST BP >= 140 MM HG: CPT | Performed by: SURGERY

## 2023-12-26 PROCEDURE — 97598 DBRDMT OPN WND ADDL 20CM/<: CPT

## 2023-12-26 PROCEDURE — 99024 POSTOP FOLLOW-UP VISIT: CPT | Performed by: SURGERY

## 2023-12-26 PROCEDURE — 97597 DBRDMT OPN WND 1ST 20 CM/<: CPT

## 2023-12-26 ASSESSMENT — FIBROSIS 4 INDEX: FIB4 SCORE: 1.4

## 2023-12-26 NOTE — PROGRESS NOTES
HISTORY OF PRESENT ILLNESS: The patient is a 48 year-old man who returns to the [facility] for routine follow-up after undergoing a sigmoid colon resection with end colostomy and application of negative pressure dressing for perforated sigmoid colon and feculent peritonitis on 12/8/2023 by Ariel Caal MD. He completed a 4 day course of antibiotics and was discharged home on 12/15/2023 with outpatient wound clinic follow up. Since discharge he has been doing well. He is tolerating a regular diet and passing stool via his ostomy. He was evaluated by wound care today and his wound vac changed, reportedly his wound is healing well. He denies fevers, chills, nausea, vomiting, chest pain. He endorses occasional abdominal pain prior to passing stool via his ostomy that spontaneously resolves, he has less pain when taking his stool softener.    CURRENT MEDICATIONS:  Current Outpatient Medications   Medication Sig    acetaminophen (TYLENOL) 500 MG Tab Take 2 Tablets by mouth every 6 hours as needed for Moderate Pain. Take as directed on the bottle.  Take as needed for pain    levothyroxine (SYNTHROID) 150 MCG Tab Take 150 mcg by mouth at bedtime.       PHYSICAL EXAMINATION:  Vital Signs: BP (!) 140/76 (BP Location: Right arm, Patient Position: Sitting)   Pulse 78   Resp 16   Ht 1.829 m (6')   Wt 94.8 kg (209 lb)   SpO2 95%   Physical Exam  Vitals reviewed.   Constitutional:       General: He is not in acute distress.     Appearance: He is not ill-appearing or toxic-appearing.   HENT:      Head: Normocephalic and atraumatic.      Right Ear: External ear normal.      Left Ear: External ear normal.      Nose: Nose normal.      Mouth/Throat:      Mouth: Mucous membranes are moist.      Pharynx: Oropharynx is clear.   Eyes:      General: No scleral icterus.     Pupils: Pupils are equal, round, and reactive to light.   Cardiovascular:      Rate and Rhythm: Normal rate and regular rhythm.       "Pulses: Normal pulses.   Pulmonary:      Effort: Pulmonary effort is normal. No respiratory distress.   Abdominal:      General: There is no distension.      Palpations: Abdomen is soft.      Tenderness: There is no abdominal tenderness. There is no guarding or rebound.      Comments: Midline wound vac in place with good seal. Left-sided end-colostomy present, pink, productive of soft brown stool.   Genitourinary:     Comments: Deferred.  Musculoskeletal:         General: No tenderness or deformity. Normal range of motion.      Cervical back: Neck supple.   Skin:     General: Skin is warm and dry.      Capillary Refill: Capillary refill takes less than 2 seconds.      Coloration: Skin is not jaundiced.   Neurological:      General: No focal deficit present.      Mental Status: He is alert and oriented to person, place, and time.   Psychiatric:         Behavior: Behavior is cooperative.         LABORATORY VALUES:  Lab Results   Component Value Date/Time    WBC 7.0 12/14/2023 05:58 AM    RBC 3.96 (L) 12/14/2023 05:58 AM    HEMOGLOBIN 11.9 (L) 12/14/2023 05:58 AM    HEMATOCRIT 34.4 (L) 12/14/2023 05:58 AM    MCV 86.9 12/14/2023 05:58 AM    MCH 30.1 12/14/2023 05:58 AM    MCHC 34.6 12/14/2023 05:58 AM    MPV 10.1 12/14/2023 05:58 AM    NEUTSPOLYS 73.10 (H) 12/14/2023 05:58 AM    LYMPHOCYTES 11.30 (L) 12/14/2023 05:58 AM    MONOCYTES 11.30 12/14/2023 05:58 AM    EOSINOPHILS 2.60 12/14/2023 05:58 AM    BASOPHILS 0.40 12/14/2023 05:58 AM    ANISOCYTOSIS 1+ 12/10/2023 04:18 AM     Lab Results   Component Value Date/Time    SODIUM 140 12/14/2023 05:58 AM    POTASSIUM 4.0 12/14/2023 05:58 AM    CHLORIDE 104 12/14/2023 05:58 AM    CO2 28 12/14/2023 05:58 AM    GLUCOSE 101 (H) 12/14/2023 05:58 AM    BUN 16 12/14/2023 05:58 AM    CREATININE 0.97 12/14/2023 05:58 AM     No results found for: \"PROTHROMBTM\", \"INR\"    IMAGING:  No orders to display       PATHOLOGY: Final pathology demonstrated segment of colon with grossly " identified perforation, acute serositis, serosal hemorrhage and ischemic changes. No malignancy identified.    ASSESSMENT AND PLAN:  1. Status post exploratory laparotomy  48 year-old man status-post Shamika's procedure for perforated diverticulitis on 12/8/23 here for follow-up.  Since discharge he has been doing well.  No signs/symptoms of infection.  Ostomy functioning well, recommended fiber supplementation for occasional pain prior to passing stool via ostomy.  Continue following with wound care for management of wound vac.  Final pathology reviewed, benign.  Post-operative precautions reviewed.  Follow-up in 1 month for wound check.  Will place referral for evaluation for ostomy takedown when ready to be discharged from [facility] clinic.       ____________________________________     Marcus Francis M.D.    DD: 12/26/2023  9:45 AM

## 2023-12-26 NOTE — PATIENT INSTRUCTIONS
-Keep your wound dressing clean, dry, and intact.    -Wound vac may not have any drainage in tube or cannister & it will still be working.   Change cannister if it does become full by pressing tab on side of machine to remove canister and snap on new one. Full canister can be thrown in the trash. If cannister fills with bright red blood - go to ER. Dressing will be changed every MWF at the wound clini.  If you are having issues with your wound VAC, please consider patching leaks, changing the canister, or calling [phone] for troubleshooting. If the wound VAC has been off or un-operational for over 2 hours, call wound care center to inform them and remove all dressings including black foam and replace with normal saline damp gauze.     -Should you experience any significant changes in your wound(s), such as infection (redness, swelling, localized heat, increased pain, fever > 101 F, chills) or have any questions regarding your home care instructions, please contact the wound center at [phone]. If after hours, contact your primary care physician or go to the hospital emergency room.

## 2023-12-26 NOTE — PROGRESS NOTES
CSWD using curette to remove ~60cm2 nonviable tissue and retained black foam from midline abdominal wound bed. 2% topical Lidocaine applied prior to debridement with ~10 minute dwell time.    Patient tolerated well.    - NPWT dressing changed to midline abdominal wound, > 50 cm ².   - 3 pieces of black foam removed. No residual foam noted in wound bed.   - 2 pieces of black foam used.   - Initiated continuous suction at 125 mm Hg, no leaks noted.

## 2023-12-29 ENCOUNTER — NON-PROVIDER VISIT (OUTPATIENT)
Dept: WOUND CARE | Facility: MEDICAL CENTER | Age: 48
End: 2023-12-29
Attending: SURGERY
Payer: COMMERCIAL

## 2023-12-29 PROCEDURE — 97605 NEG PRS WND THER DME<=50SQCM: CPT

## 2023-12-29 NOTE — PATIENT INSTRUCTIONS
-Keep your wound dressing clean, dry, and intact.    -Wound vac may not have any drainage in tube or cannister & it will still be working.   Change cannister if it does become full by pressing tab on side of machine to remove canister and snap on new one. Full canister can be thrown in the trash. If cannister fills with bright red blood - go to ER. Dressing will be changed every MWF at the wound clinic.  If you are having issues with your wound VAC, please consider patching leaks, changing the canister, or calling [phone] for troubleshooting. If the wound VAC has been off or un-operational for over 2 hours, call wound care center to inform them and remove all dressings including black foam and replace with normal saline damp gauze.     -Should you experience any significant changes in your wound(s), such as infection (redness, swelling, localized heat, increased pain, fever > 101 F, chills) or have any questions regarding your home care instructions, please contact the wound center at [phone]. If after hours, contact your primary care physician or go to the hospital emergency room.

## 2024-01-01 ENCOUNTER — NON-PROVIDER VISIT (OUTPATIENT)
Dept: WOUND CARE | Facility: MEDICAL CENTER | Age: 49
End: 2024-01-01
Attending: SURGERY
Payer: COMMERCIAL

## 2024-01-01 PROCEDURE — 99211 OFF/OP EST MAY X REQ PHY/QHP: CPT

## 2024-01-01 PROCEDURE — 97605 NEG PRS WND THER DME<=50SQCM: CPT

## 2024-01-01 NOTE — WOUND TEAM
"[facility]  [facility]  [address]  [place] [address]  [phone] Fax#    Ostomy Assessment  For 90 Day Certification Period:  12/18/23 - 3/18/24    Referring Provider:  Ariel Caal M.D   Primary Provider: Karan Blair D.O  Consulting Providers: LINDA Villalpando  Surgeon: Ariel Caal M.D     Start of Care: 12/18/23  Reason for referral: new colostomy & midline wound    SUBJECTIVE:  \"It's going pretty good.\"    HPI: 48 year old male presents with new colostomy and midline abdominal wound. On 12/8/23, patient presented to the ER with severe abdominal pain that started that morning and increased after vomiting. Patient was found to have free intraperitoneal air. That same day, Dr. Caal preformed a Sigmoid colon resection with end colostomy and application of midline abdominal wound NPWT due to Perforated sigmoid colon & feculent peritonitis. Patient was seen during his hospital stay by the IP wound team for NPWT changes and ostomy education.     Past Medical Hx:   Past Medical History:   Diagnosis Date    Cancer (HCC)       Surgical Hx:   Past Surgical History:   Procedure Laterality Date    VT LAP,DIAGNOSTIC ABDOMEN  12/8/2023    Procedure: SIGMOID COLECTOMY WITH COLOSTOMY CREATION;  Surgeon: Ariel Caal M.D.;  Location: SURGERY [facility];  Service: General    TONSILLECTOMY  2/6/2015    Performed by Fernando Fair M.D. at SURGERY SAME DAY [facility] ORS    LARYNGOSCOPY  2/6/2015    Performed by Fernando Fair M.D. at SURGERY SAME DAY [facility] ORS    LYMPH NODE EXCISION  1/30/2015    Performed by Fernando Fair M.D. at SURGERY SAME DAY [facility] ORS      Medications:   Current Outpatient Medications on File Prior to Visit   Medication Sig Dispense Refill    acetaminophen (TYLENOL) 500 MG Tab Take 2 Tablets by mouth every 6 hours as needed for Moderate Pain. Take as directed on the bottle.  Take as needed for pain 30 Tablet 0    " "levothyroxine (SYNTHROID) 150 MCG Tab Take 150 mcg by mouth at bedtime.       No current facility-administered medications on file prior to visit.      Allergies: Patient has no known allergies.   Social Hx:   Social History     Socioeconomic History    Marital status: Single     Spouse name: Not on file    Number of children: Not on file    Years of education: Not on file    Highest education level: Not on file   Occupational History    Not on file   Tobacco Use    Smoking status: Former     Current packs/day: 0.00     Average packs/day: 1 pack/day for 15.0 years (15.0 ttl pk-yrs)     Types: Cigarettes     Start date: 1993     Quit date: 2008     Years since quittin.0    Smokeless tobacco: Never   Vaping Use    Vaping Use: Never used   Substance and Sexual Activity    Alcohol use: No    Drug use: No    Sexual activity: Not on file   Other Topics Concern    Not on file   Social History Narrative    Not on file     Social Determinants of Health     Financial Resource Strain: Not on file   Food Insecurity: Not on file   Transportation Needs: Not on file   Physical Activity: Not on file   Stress: Not on file   Social Connections: Not on file   Intimate Partner Violence: Not on file   Housing Stability: Not on file        OBJECTIVE: 2 piece 57mm flat ctf with paste ring intact with minimal wear on ring after 2 days    STOMA ASSESSMENT:   Type:  ____Ileostomy     __ X__Colostomy    ____Urostomy    ____Other     Location:  LLQ    Size: 1 2\"   Shape: Round   Color: Moist red   Protrudes:      _x__ >1 inch               ___ <1 inch   [place]: [place]   Mucocutaneous Junction: Separation at medial edge   Skin indentations, creases or scar tissue: When sitting in scant bowl   Danielle-stomal Skin Problems: Mild irritation from wear on barrier   Effluent / Flatus: Brown - thick   Photo  ____ Yes __X_ No (photo below from last visit)      Laying flat      Sitting    Pouching Procedure: Pt needs to attempt entire " "appliance change Wednesday 1/3/24.               Old appliance removed with medical adhesive remover.              Peristomal skin cleansed with: Warm water & gauze              Peristomal skin treated with: Stoma powder & no sting skin prep crusting to medial MCJ              Ostomy appliance used:  2\" slim paste ring (2970) wrapped around stoma, 2 piece ctf flat 57 (23476) & matching pouch (68188)       Patient Education: Change colostomies every 5-7 days. Change appliance immediately if it is leaking or peristomal skin feels irritated, has itching, or  burning.   To change the appliance, remove previous appliance, cleanse peristomal skin with warm water/washcloth, pat dry, make an ostomy template or use cardboard measuring guide and trace ostomy shape onto back of barrier, cut out barrier, apply a paste ring around barrier opening and apply appliance. Empty pouches when no more than ½ full. Check contents every 2 hours or as needed. Do not leave soap residue on tissue and do not use baby wipes or skin prep wipes.     Should you experience any significant changes in your wound(s), such as infection (redness, swelling, localized heat, increased pain, fever > 101 F, chills) or have any questions regarding your home care instructions, please contact the wound center at [phone]. If after hours, contact your primary care physician or go to the hospital emergency room.   Keep dressing clean, dry and cover when bathing. Only change dressing if it's over saturated, soiled or falls off.    Eat protein, stay hydrated, get rest for wound healing. Do no lift anything more than 10 pounds, but you can go for walks. Call [facility] to make a follow-up with your surgeon.   Verbal/demonstration instructions of above pouching procedure provided to patient/family.      Response:       PLAN: Return 3x/week for NPWT; smaller appliance working well to keep ostomy change separate from wound. At next appointment have patient " practice changing ostomy appliance independently while standing; and if he still likes the 57 mm, can order supplies.     Supplies Needed:   Appliance type:    Mode as noted above             Other:      Accessories:         Supplier: to be decided, possibly [employer]    Frequency:  3x/week for NPWT. Patient & clinic goal to make patient independent in his own ostomy care.    Professional Collaboration: None today    At the time of each visit, a thorough assessment of the patient is completed to assure appropriateness of our plan of care.  The plan of care may need to be adapted from the original plan of care to address any significant changes in patient status.

## 2024-01-01 NOTE — PROGRESS NOTES
2% viscous lidocaine for ~5 minute dwell time. Had to soak the vac dressing with saline to remove and patient still complained of pain and the foam was sticking. Adaptic to entire base of wound bed.    - NPWT dressing changed to mid abdomen wound, < 50 cm ².   - 2 piece(s) of black foam removed. No residual foam noted in wound bed.   - 3 piece(s) of black foam used.   - Resumed suction at 125 mm Hg, no leaks noted.  Patient tolerated well.     Was not able to have patient change his ostomy today. Please have him change on Wednesday during provider appointment in room 9 if possible. Patient is agreeable to learning how to change.

## 2024-01-01 NOTE — PATIENT INSTRUCTIONS
Change colostomies every 5-7 days. Change appliance immediately if it is leaking or peristomal skin feels irritated, has itching, or  burning.   To change the appliance, remove previous appliance, cleanse peristomal skin with warm water/washcloth, pat dry, make an ostomy template or use cardboard measuring guide and trace ostomy shape onto back of barrier, cut out barrier, apply a paste ring around barrier opening and apply appliance. Empty pouches when no more than ½ full. Check contents every 2 hours or as needed. Do not leave soap residue on tissue and do not use baby wipes or skin prep wipes.     -Keep your wound dressing clean, dry, and intact.    -Change your dressing if it becomes soiled, soaked, or falls off.    -Wound vac may not have any drainage in tube or cannister & it will still be working.   Change cannister if it does become full by pressing tab on side of machine to remove canister and snap on new one. Full canister can be thrown in the trash. If cannister fills with bright red blood - go to ER. Dressing will be changed every MWF at the wound clinic.  If you are having issues with your wound VAC, please consider patching leaks, changing the canister, or calling [phone] for troubleshooting. If the wound VAC has been off or un-operational for over 2 hours, call wound care center to inform them and remove all dressings including black foam and replace with normal saline damp gauze.     -Should you experience any significant changes in your wound(s), such as infection (redness, swelling, localized heat, increased pain, fever > 101 F, chills) or have any questions regarding your home care instructions, please contact the wound center at [phone]. If after hours, contact your primary care physician or go to the hospital emergency room.

## 2024-01-03 ENCOUNTER — OFFICE VISIT (OUTPATIENT)
Dept: WOUND CARE | Facility: MEDICAL CENTER | Age: 49
End: 2024-01-03
Attending: SURGERY
Payer: COMMERCIAL

## 2024-01-03 VITALS
SYSTOLIC BLOOD PRESSURE: 105 MMHG | TEMPERATURE: 96.5 F | OXYGEN SATURATION: 98 % | DIASTOLIC BLOOD PRESSURE: 71 MMHG | HEART RATE: 91 BPM | RESPIRATION RATE: 18 BRPM

## 2024-01-03 DIAGNOSIS — Z43.3 COLOSTOMY CARE (HCC): ICD-10-CM

## 2024-01-03 DIAGNOSIS — R52 PAIN ASSOCIATED WITH WOUND: ICD-10-CM

## 2024-01-03 DIAGNOSIS — S31.109D OPEN WOUND OF ABDOMEN, SUBSEQUENT ENCOUNTER: ICD-10-CM

## 2024-01-03 DIAGNOSIS — T14.8XXA PAIN ASSOCIATED WITH WOUND: ICD-10-CM

## 2024-01-03 PROCEDURE — 97605 NEG PRS WND THER DME<=50SQCM: CPT

## 2024-01-03 PROCEDURE — 11042 DBRDMT SUBQ TIS 1ST 20SQCM/<: CPT | Performed by: STUDENT IN AN ORGANIZED HEALTH CARE EDUCATION/TRAINING PROGRAM

## 2024-01-03 PROCEDURE — 3074F SYST BP LT 130 MM HG: CPT | Performed by: STUDENT IN AN ORGANIZED HEALTH CARE EDUCATION/TRAINING PROGRAM

## 2024-01-03 PROCEDURE — 11045 DBRDMT SUBQ TISS EACH ADDL: CPT

## 2024-01-03 PROCEDURE — 3078F DIAST BP <80 MM HG: CPT | Performed by: STUDENT IN AN ORGANIZED HEALTH CARE EDUCATION/TRAINING PROGRAM

## 2024-01-03 PROCEDURE — 11042 DBRDMT SUBQ TIS 1ST 20SQCM/<: CPT

## 2024-01-03 PROCEDURE — 11045 DBRDMT SUBQ TISS EACH ADDL: CPT | Performed by: STUDENT IN AN ORGANIZED HEALTH CARE EDUCATION/TRAINING PROGRAM

## 2024-01-03 NOTE — PATIENT INSTRUCTIONS
-Keep your wound dressing clean, dry, and intact.    -Wound vac may not have any drainage in tube or cannister & it will still be working.   Change cannister if it does become full by pressing tab on side of machine to remove canister and snap on new one. Full canister can be thrown in the trash. If cannister fills with bright red blood - go to ER. Dressing will be changed every MWF at the wound clinic.  If you are having issues with your wound VAC, please consider patching leaks, changing the canister, or calling [phone] for troubleshooting. If the wound VAC has been off or un-operational for over 2 hours, call wound care center to inform them and remove all dressings including black foam and replace with normal saline damp gauze.     -Should you experience any significant changes in your wound(s), such as infection (redness, swelling, localized heat, increased pain, fever > 101 F, chills) or have any questions regarding your home care instructions, please contact the wound center at [phone]. If after hours, contact your primary care physician or go to the hospital emergency room.

## 2024-01-03 NOTE — PROGRESS NOTES
Provider Encounter- Full Thickness wound    HISTORY OF PRESENT ILLNESS  Wound History:    START OF CARE IN CLINIC: 12/18/23    REFERRING PROVIDER: Ariel Caal MD     WOUND- Full Thickness Wound   LOCATION: midline abdomen   Ostomy: End colostomy LLQ  SURGICAL HISTORY: Sigmoid colon resection with end colostomy, VAC placement on 12/8/2023 by Dr. Caal   HISTORY: Had severe abdominal pain with nausea and vomiting.  Presented to ED 12/8/2023 and was admitted until 12/15/2023 for pneumoperitoneum and perforated sigmoid colon as seen on CT.  Underwent surgery listed above.  Was on Zosyn for 4 days for positive peritoneal culture of E. coli.  He was seen by wound team for VAC changes and ostomy care.  He tolerated diet, colostomy was functioning and patient discharged to home with referral to outpatient wound care clinic for both treatment of his wound and ostomy.    Pertinent Medical History: History of cancer to neck and throat s/p chemo and radiation, followed by surgical removal of tumor.  Was informed he is cancer free..     TOBACCO USE:  former. Stopped smoking 20years ago  No alcohol use  No drug use  Occupation:     Patient's problem list, allergies, and current medications reviewed and updated in Epic    Interval History:  12/22/23: Initial provider visit for VAC/ostomy. Clinic visit with Kathe MCKEON, CECILIO-BC, YAA, DERIAN.  Pt denies fevers, chills, nausea, vomiting.   No leaks from ostomy appliance.  Appliance being changed 3 times a week at VAC change appointments.  Patient beginning hands-on teaching.  He is emptying his appliance about 2-3 times per day.  Does have brown soft stool. patient is eating regular diet.  Ambulating without difficulty.  Currently taking time off of work.  Patient has follow-up with general surgery APRN on 12/26/2023.  Currently not on antibiotics.    1/3/2024: Clinic visit with Richard Robledo MD. Patient reports doing well, no issues with VAC this  week and tolerating well. Wound is measuring smaller with improved granulation tissue, thin layer of biofilm. Patients ostomy has been functioning well. No leaks from appliance. Was changed on Monday, will defer to be changed on Friday and will be working with ostomy nurses to become more independent in care.    REVIEW OF SYSTEMS:   Unchanged from previous wound clinic assessment on 12/22/2023, except as noted in interval history above      PHYSICAL EXAMINATION:   /71   Pulse 91   Temp 35.8 °C (96.5 °F) (Temporal)   Resp 18   SpO2 98%     Physical Exam  Vitals reviewed.   Constitutional:       Appearance: Normal appearance.   Cardiovascular:      Rate and Rhythm: Normal rate.   Abdominal:      General: Bowel sounds are normal.      Palpations: Abdomen is soft.      Tenderness: There is abdominal tenderness (Improving).      Comments: LLQ colostomy: Pink, productive, patent. Declined further evaluation today as was recently changed on Monday and without dysfunction this week. Will be changed later this week.   Skin:     Comments: Midline abdomen full thickness wound: Wound is measuring smaller, good granulation tissue with thin layer of biofilm. No evidence of infection.   Neurological:      Mental Status: He is alert.         WOUND ASSESSMENT  Wound 12/08/23 Full Thickness Wound Open Incision Abdomen (Active)   Wound Image   01/03/24 0930   Site Assessment Red;Granulation tissue 01/03/24 0930   Periwound Assessment Scar tissue 01/03/24 0930   Margins Unattached edges 01/03/24 0930   Closure Secondary intention 01/03/24 0930   Drainage Amount Moderate 01/03/24 0930   Drainage Description Serosanguineous 01/03/24 0930   Treatments Cleansed;Topical Lidocaine;Provider debridement;Site care 01/03/24 0930   Wound Cleansing Normal Saline Irrigation 01/03/24 0930   Periwound Protectant No-sting Skin Prep;Drape;Benzoin 01/03/24 0930   Dressing Changed Changed 01/03/24 0930   Dressing Cleansing/Solutions Not  "Applicable 01/03/24 0930   Dressing Options Wound Vac 01/03/24 0930   Dressing Change/Treatment Frequency Monday, Wednesday, Friday, and As Needed 01/03/24 0930   Wound Team Following 3x Weekly 01/03/24 0930   Non-staged Wound Description Full thickness 01/03/24 0930   Wound Length (cm) 19 cm 01/03/24 0930   Wound Width (cm) 1.4 cm 01/03/24 0930   Wound Depth (cm) 1.4 cm 01/03/24 0930   Wound Surface Area (cm^2) 26.6 cm^2 01/03/24 0930   Wound Volume (cm^3) 37.24 cm^3 01/03/24 0930   Post-Procedure Length (cm) 19 cm 01/03/24 0930   Post-Procedure Width (cm) 1.4 cm 01/03/24 0930   Post-Procedure Depth (cm) 1.4 cm 01/03/24 0930   Post-Procedure Surface Area (cm^2) 26.6 cm^2 01/03/24 0930   Post-Procedure Volume (cm^3) 37.24 cm^3 01/03/24 0930   Wound Healing % 87 01/03/24 0930   Wound Bed Granulation (%) 100 % 12/18/23 1355   Tunneling (cm) 0 cm 01/03/24 0930   Undermining (cm) 0 cm 01/03/24 0930   Wound Odor None 01/03/24 0930   Exposed Structures None 01/03/24 0930   Number of days: 26       PROCEDURE: Excisional debridement of midline wound  -2% viscous lidocaine applied topically to wound bed for approximately 5 minutes prior to debridement  -Curette used to debride wound bed.  Excisional debridement was performed to remove devitalized tissue until healthy, bleeding tissue was visualized.   Entire surface of wound, 26.6 cm2 debrided.  Tissue debrided into the subcutaneous layer.    -Bleeding controlled with manual pressure.    -Wound care completed by wound RN, refer to flowsheet  -Patient tolerated the procedure well, without c/o pain or discomfort.     Pertinent Labs and Diagnostics:    Labs:     A1c: No results found for: \"HBA1C\"       IMAGING: CT preop 12/8/2023  1. Moderate free intraperitoneal air, concerning for perforated viscus. There is free fluid surrounding the liver and in bilateral lower quadrant as well. The exact location of the perforation is unknown.   2. Significantly dilated rectum with large " amount of stool and decompress sigmoid colon. This could relate to stercoral colitis and could be a potential area for perforation.   3. A 3.7 cm right adrenal nodule with peripheral calcified wall, nonspecific but could be a pseudocyst or chronic hematoma.    VASCULAR STUDIES: N/A    LAST  WOUND CULTURE:  DATE :   Lab Results   Component Value Date/Time    CULTRSULT - (A) 12/08/2023 07:19 PM    CULTRSULT Escherichia coli  Rare growth   (A) 12/08/2023 07:19 PM    CULTRSULT - (A) 12/08/2023 07:19 PM    CULTRSULT Bacteroides stercoris group  Light growth   (A) 12/08/2023 07:19 PM    CULTRSULT Bacteroides thetaiotaomicron group  Light growth   (A) 12/08/2023 07:19 PM         ASSESSMENT AND PLAN:     1. Open wound of abdomen, subsequent encounter  1/3/2023: Wound measuring smaller, good granulation tissue.  - Excisional debridement performed today.  Medically necessary to promote wound healing.  - Wound continues to improve, continue wound VAC  - No evidence of wound infection, continue to monitor.  - Counseled on the importance of nutrition, encourage increased protein intake and taking MVI.  -Patient to return to wound clinic 3 times weekly for assessment, possible debridement, VAC changes  -Lap sites remain healed.  Staples removed 12/18/2023    Wound care: VAC at -125 mmHg continuous with black foam    2. Pain associated with wound    1/3/2023  - Pain improving. Previously using contact layer to aid with VAC removal. Recommend discontinuing contact layer to allow for better granulation formation under black foam  - Continue to use topical lidocaine prior to debridement    3. Colostomy care ([facility])    1/3/2024  - Patient denies any leaks. Ostomy appliance changed on Monday, he declined evaluation today. Will be changed next clinic appointment  -  Patient to become more independent in care with further education from ostomy nurses.    Ostomy care: Stoma powder to separation, 2 inch slim paste ring, 2 piece flat cut to  fit 57 mm barrier and pouch    PATIENT EDUCATION  - Importance of adequate nutrition for wound healing  -Advised to go to ER for any increased redness, swelling, drainage, or odor, or if patient develops fever, chills, nausea or vomiting.     Please note that this note may have been created using voice recognition software. I have worked with technical experts from [facility] to optimize the interface.  I have made every reasonable attempt to correct obvious errors, but there may be errors of grammar and possibly content that I did not discover before finalizing the note.    N

## 2024-01-03 NOTE — PROGRESS NOTES
- NPWT dressing changed to mid abdomen wound, < 50 cm ².   - 3 piece(s) of black foam removed. No residual foam noted in wound bed.   - 2 piece(s) of black foam used.   - Resumed suction at 125 mm Hg, no leaks noted.  Patient tolerated well.     Patient declined ostomy change today, as it was changed by RN two days ago. Reiterated to patient need for him to perform all hands on of ostomy change at least once weekly, twice weekly if needed for practice. Patient agreeable to  front of mirror in room 9 and change appliance himself on Friday. Requested with Naida that all appointments need to be 60 mins.

## 2024-01-05 ENCOUNTER — OFFICE VISIT (OUTPATIENT)
Dept: WOUND CARE | Facility: MEDICAL CENTER | Age: 49
End: 2024-01-05
Attending: SURGERY
Payer: COMMERCIAL

## 2024-01-05 DIAGNOSIS — S31.109D OPEN WOUND OF ABDOMEN, SUBSEQUENT ENCOUNTER: ICD-10-CM

## 2024-01-05 DIAGNOSIS — Z43.3 COLOSTOMY CARE (HCC): ICD-10-CM

## 2024-01-05 PROCEDURE — 99211 OFF/OP EST MAY X REQ PHY/QHP: CPT

## 2024-01-05 PROCEDURE — 99999 PR NO CHARGE: CPT | Performed by: NURSE PRACTITIONER

## 2024-01-05 PROCEDURE — 97605 NEG PRS WND THER DME<=50SQCM: CPT

## 2024-01-06 NOTE — PATIENT INSTRUCTIONS
Change colostomies every 5-7 days. Change appliance immediately if it is leaking or peristomal skin feels irritated, has itching, or  burning.   To change the appliance, remove previous appliance, cleanse peristomal skin with warm water/washcloth, pat dry, make an ostomy template or use cardboard measuring guide and trace ostomy shape onto back of barrier, cut out barrier, apply a paste ring around barrier opening and apply appliance. Empty pouches when no more than ½ full. Check contents every 2 hours or as needed. Do not leave soap residue on tissue and do not use baby wipes or skin prep wipes.     -Keep your wound dressing clean, dry, and intact.    -Change your dressing if it becomes soiled, soaked, or falls off.    -Wound vac may not have any drainage in tube or cannister & it will still be working.   Change cannister if it does become full by pressing tab on side of machine to remove canister and snap on new one. Full canister can be thrown in the trash. If cannister fills with bright red blood - go to ER. Dressing will be changed every MWF at the wound clinic.  If you are having issues with your wound VAC, please consider patching leaks, changing the canister, or calling [phone] for troubleshooting. If the wound VAC has been off or un-operational for over 2 hours, call wound care center to inform them and remove all dressings including black foam and replace with normal saline damp gauze.     -Should you experience any significant changes in your wound(s), such as infection (redness, swelling, localized heat, increased pain, fever > 101 F, chills) or have any questions regarding your home care instructions, please contact the wound center at [phone]. If after hours, contact your primary care physician or go to the hospital emergency room.

## 2024-01-06 NOTE — WOUND TEAM
"Advanced Wound Care  [facility]  [address]  [place] [address]  [phone] Fax#    Ostomy Assessment  For 90 Day Certification Period:  12/18/23 - 3/18/24    Referring Provider:  Ariel Caal M.D   Primary Provider: Karan Blair D.O  Consulting Providers: LINDA Villalpando  Surgeon: Ariel Caal M.D     Start of Care: 12/18/23  Reason for referral: new colostomy & midline wound    SUBJECTIVE:  \"It's going pretty good.\"    HPI: 48 year old male presents with new colostomy and midline abdominal wound. On 12/8/23, patient presented to the ER with severe abdominal pain that started that morning and increased after vomiting. Patient was found to have free intraperitoneal air. That same day, Dr. Caal preformed a Sigmoid colon resection with end colostomy and application of midline abdominal wound NPWT due to Perforated sigmoid colon & feculent peritonitis. Patient was seen during his hospital stay by the IP wound team for NPWT changes and ostomy education.     Past Medical Hx:   Past Medical History:   Diagnosis Date    Cancer (HCC)       Surgical Hx:   Past Surgical History:   Procedure Laterality Date    MO LAP,DIAGNOSTIC ABDOMEN  12/8/2023    Procedure: SIGMOID COLECTOMY WITH COLOSTOMY CREATION;  Surgeon: Ariel Caal M.D.;  Location: SURGERY [facility];  Service: General    TONSILLECTOMY  2/6/2015    Performed by Fernando Fair M.D. at SURGERY SAME DAY [facility] ORS    LARYNGOSCOPY  2/6/2015    Performed by Fernando Fair M.D. at SURGERY SAME DAY [facility] ORS    LYMPH NODE EXCISION  1/30/2015    Performed by Fernando Fair M.D. at SURGERY SAME DAY [facility] ORS      Medications:   Current Outpatient Medications on File Prior to Visit   Medication Sig Dispense Refill    acetaminophen (TYLENOL) 500 MG Tab Take 2 Tablets by mouth every 6 hours as needed for Moderate Pain. Take as directed on the bottle.  Take as needed for pain 30 Tablet 0    " levothyroxine (SYNTHROID) 150 MCG Tab Take 150 mcg by mouth at bedtime.       No current facility-administered medications on file prior to visit.      Allergies: Patient has no known allergies.   Social Hx:   Social History     Socioeconomic History    Marital status: Single     Spouse name: Not on file    Number of children: Not on file    Years of education: Not on file    Highest education level: Not on file   Occupational History    Not on file   Tobacco Use    Smoking status: Former     Current packs/day: 0.00     Average packs/day: 1 pack/day for 15.0 years (15.0 ttl pk-yrs)     Types: Cigarettes     Start date: 1993     Quit date: 2008     Years since quittin.0    Smokeless tobacco: Never   Vaping Use    Vaping Use: Never used   Substance and Sexual Activity    Alcohol use: No    Drug use: No    Sexual activity: Not on file   Other Topics Concern    Not on file   Social History Narrative    Not on file     Social Determinants of Health     Financial Resource Strain: Not on file   Food Insecurity: Not on file   Transportation Needs: Not on file   Physical Activity: Not on file   Stress: Not on file   Social Connections: Not on file   Intimate Partner Violence: Not on file   Housing Stability: Not on file        Negative Pressure Wound Therapy 23 Abdomen (Active)   NPWT Pump Mode / Pressure Setting Continuous;125 mmHg 24 1615   Dressing Type Black Foam (Regular) 24 1615   Number of Foam Pieces Used 2 24 1615   Canister Changed No 24 1615     Wound 23 Full Thickness Wound Open Incision Abdomen (Active)   Wound Image   24 0930   Site Assessment Red;Granulation tissue 24 1615   Periwound Assessment Scar tissue 24 1615   Margins Unattached edges 24 1615   Closure Secondary intention 24 1615   Drainage Amount Moderate 24 1615   Drainage Description Serosanguineous 24 1615   Treatments Cleansed;Nonselective debridement;Site  "care 01/05/24 1615   Wound Cleansing Normal Saline Irrigation 01/05/24 1615   Periwound Protectant No-sting Skin Prep;Drape 01/05/24 1615   Dressing Changed Changed 01/05/24 1615   Dressing Cleansing/Solutions Not Applicable 01/05/24 1615   Dressing Options Wound Vac 01/05/24 1615   Dressing Change/Treatment Frequency Monday, Wednesday, Friday, and As Needed 01/05/24 1615   Wound Team Following 3x Weekly 01/05/24 1615   Non-staged Wound Description Full thickness 01/05/24 1615   Wound Length (cm) 19 cm 01/03/24 0930   Wound Width (cm) 1.4 cm 01/03/24 0930   Wound Depth (cm) 1.4 cm 01/03/24 0930   Wound Surface Area (cm^2) 26.6 cm^2 01/03/24 0930   Wound Volume (cm^3) 37.24 cm^3 01/03/24 0930   Post-Procedure Length (cm) 19 cm 01/03/24 0930   Post-Procedure Width (cm) 1.4 cm 01/03/24 0930   Post-Procedure Depth (cm) 1.4 cm 01/03/24 0930   Post-Procedure Surface Area (cm^2) 26.6 cm^2 01/03/24 0930   Post-Procedure Volume (cm^3) 37.24 cm^3 01/03/24 0930   Wound Healing % 87 01/03/24 0930   Wound Bed Granulation (%) 100 % 12/18/23 1355   Tunneling (cm) 0 cm 01/05/24 1615   Undermining (cm) 0 cm 01/05/24 1615   Wound Odor None 01/05/24 1615   Exposed Structures None 01/05/24 1615     OBJECTIVE: 2 piece 57mm flat ctf with paste ring intact with minimal wear on ring after 2 days    STOMA ASSESSMENT:   Type:  ____Ileostomy     __ X__Colostomy    ____Urostomy    ____Other     Location:  LLQ    Size: 1 3/8\"   Shape: Round   Color: Moist red   Protrudes:      _x__ >1 inch               ___ <1 inch   [place]: Central   Mucocutaneous Junction: Separation at medial edge   Skin indentations, creases or scar tissue: When sitting in scant bowl   Danielle-stomal Skin Problems: Mild irritation from wear on barrier   Effluent / Flatus: Brown - thick   Photo  ____ Yes _X__ No    Pouching Procedure:               Old appliance removed with push pull method               Peristomal skin cleansed with: Warm water & gauze              Peristomal " "skin treated with: Stoma powder & no sting skin prep crusting to medial MCJ              Ostomy appliance used:  2\" slim paste ring (5717) wrapped around stoma, 2 piece ctf flat 57 (04165) & matching pouch (06524)       Patient Education: Patient did the ostomy appliance change today with minimal assistance from this RN. He did not utilize the mirror in room 9 but looked down on his stoma and was able to measure his stoma, to cut the hole in the barrier, to crust and to place the appliance. Let him know that this RN wants him to change it Monday and Friday next week so that he gets a little more practice. Let him know that once he is comfortable with changing it (anticipate he will only need this next week to feel comfortable) we will order supplies and he will change it at home and he will only be coming to clinic for his wound care. His stoma was a bit smaller today, so we may hold off on ordering until later next week or the week after. Patient agreeable to POC.     Change colostomies every 5-7 days. Change appliance immediately if it is leaking or peristomal skin feels irritated, has itching, or  burning.   To change the appliance, remove previous appliance, cleanse peristomal skin with warm water/washcloth, pat dry, make an ostomy template or use cardboard measuring guide and trace ostomy shape onto back of barrier, cut out barrier, apply a paste ring around barrier opening and apply appliance. Empty pouches when no more than ½ full. Check contents every 2 hours or as needed. Do not leave soap residue on tissue and do not use baby wipes or skin prep wipes.     Should you experience any significant changes in your wound(s), such as infection (redness, swelling, localized heat, increased pain, fever > 101 F, chills) or have any questions regarding your home care instructions, please contact the wound center at [phone]. If after hours, contact your primary care physician or go to the hospital emergency " room.   Keep dressing clean, dry and cover when bathing. Only change dressing if it's over saturated, soiled or falls off.    Eat protein, stay hydrated, get rest for wound healing. Do no lift anything more than 10 pounds, but you can go for walks. Call [facility] to make a follow-up with your surgeon.   Verbal/demonstration instructions of above pouching procedure provided to patient/family.      Response:       PLAN: Return 3x/week for NPWT; smaller appliance working well to keep ostomy change separate from wound. At Monday and Friday's appointments, have patient practice changing ostomy appliance independently while standing; and if he still likes the 57 mm, can order supplies.     Supplies Needed:   Appliance type:    Mode as noted above             Other:      Accessories:         Supplier: to be decided, possibly [employer]    Frequency:  3x/week for NPWT. Patient & clinic goal to make patient independent in his own ostomy care.    Professional Collaboration: None today    At the time of each visit, a thorough assessment of the patient is completed to assure appropriateness of our plan of care.  The plan of care may need to be adapted from the original plan of care to address any significant changes in patient status.

## 2024-01-06 NOTE — PROGRESS NOTES
Brief provider note:    Seen by RN only today for midline abdominal wound and ostomy as patient was previously seen by provider this week on 1/3/2024.

## 2024-01-06 NOTE — PROGRESS NOTES
- NPWT dressing changed to midline abdomen wound, < 50 cm ².   - 2 piece(s) of black foam removed. No residual foam noted in wound bed.   - 2 piece(s) of black foam used.   - Resumed suction at 125 mm Hg, no leaks noted.  Patient tolerated well. Please see ostomy note for updated POC for patient's ostomy needs.

## 2024-01-08 ENCOUNTER — NON-PROVIDER VISIT (OUTPATIENT)
Dept: WOUND CARE | Facility: MEDICAL CENTER | Age: 49
End: 2024-01-08
Attending: SURGERY
Payer: COMMERCIAL

## 2024-01-08 PROCEDURE — 97605 NEG PRS WND THER DME<=50SQCM: CPT

## 2024-01-08 NOTE — PATIENT INSTRUCTIONS
-Keep your wound dressing clean, dry, and intact.    -Change your dressing if it becomes soiled, soaked, or falls off.    -Wound vac may not have any drainage in tube or cannister & it will still be working.   Change cannister if it does become full by pressing tab on side of machine to remove canister and snap on new one. Full canister can be thrown in the trash. If cannister fills with bright red blood - go to ER. Dressing will be changed every MWF at the wound clini.  If you are having issues with your wound VAC, please consider patching leaks, changing the canister, or calling [phone] for troubleshooting. If the wound VAC has been off or un-operational for over 2 hours, call wound care center to inform them and remove all dressings including black foam and replace with normal saline damp gauze.     -Should you experience any significant changes in your wound(s), such as infection (redness, swelling, localized heat, increased pain, fever > 101 F, chills) or have any questions regarding your home care instructions, please contact the wound center at [phone]. If after hours, contact your primary care physician or go to the hospital emergency room.

## 2024-01-08 NOTE — WOUND TEAM
"Advanced Wound Care  [facility]  [address]  [place] [address]  [phone] Fax#    Ostomy Assessment  For 90 Day Certification Period:  12/18/23 - 3/18/24    Referring Provider:  Ariel Caal M.D   Primary Provider: Karan Blair D.O  Consulting Providers: LINDA Villalpando  Surgeon: Ariel Caal M.D     Start of Care: 12/18/23  Reason for referral: new colostomy & midline wound    SUBJECTIVE:  \"I'm doing okay today.\"    HPI: 48 year old male presents with new colostomy and midline abdominal wound. On 12/8/23, patient presented to the ER with severe abdominal pain that started that morning and increased after vomiting. Patient was found to have free intraperitoneal air. That same day, Dr. Caal preformed a Sigmoid colon resection with end colostomy and application of midline abdominal wound NPWT due to Perforated sigmoid colon & feculent peritonitis. Patient was seen during his hospital stay by the IP wound team for NPWT changes and ostomy education.     Past Medical Hx:   Past Medical History:   Diagnosis Date    Cancer (HCC)       Surgical Hx:   Past Surgical History:   Procedure Laterality Date    MO LAP,DIAGNOSTIC ABDOMEN  12/8/2023    Procedure: SIGMOID COLECTOMY WITH COLOSTOMY CREATION;  Surgeon: Ariel Caal M.D.;  Location: SURGERY [facility];  Service: General    TONSILLECTOMY  2/6/2015    Performed by Fernando Fair M.D. at SURGERY SAME DAY [facility] ORS    LARYNGOSCOPY  2/6/2015    Performed by Fernando Fair M.D. at SURGERY SAME DAY [facility] ORS    LYMPH NODE EXCISION  1/30/2015    Performed by Fernando Fair M.D. at SURGERY SAME DAY [facility] ORS      Medications:   Current Outpatient Medications on File Prior to Visit   Medication Sig Dispense Refill    acetaminophen (TYLENOL) 500 MG Tab Take 2 Tablets by mouth every 6 hours as needed for Moderate Pain. Take as directed on the bottle.  Take as needed for pain 30 Tablet 0    " levothyroxine (SYNTHROID) 150 MCG Tab Take 150 mcg by mouth at bedtime.       No current facility-administered medications on file prior to visit.      Allergies: Patient has no known allergies.   Social Hx:   Social History     Socioeconomic History    Marital status: Single     Spouse name: Not on file    Number of children: Not on file    Years of education: Not on file    Highest education level: Not on file   Occupational History    Not on file   Tobacco Use    Smoking status: Former     Current packs/day: 0.00     Average packs/day: 1 pack/day for 15.0 years (15.0 ttl pk-yrs)     Types: Cigarettes     Start date: 1993     Quit date: 2008     Years since quittin.0    Smokeless tobacco: Never   Vaping Use    Vaping Use: Never used   Substance and Sexual Activity    Alcohol use: No    Drug use: No    Sexual activity: Not on file   Other Topics Concern    Not on file   Social History Narrative    Not on file     Social Determinants of Health     Financial Resource Strain: Not on file   Food Insecurity: Not on file   Transportation Needs: Not on file   Physical Activity: Not on file   Stress: Not on file   Social Connections: Not on file   Intimate Partner Violence: Not on file   Housing Stability: Not on file         MIDLINE ABDOMINAL WOUND     Negative Pressure Wound Therapy 23 Abdomen (Active)   NPWT Pump Mode / Pressure Setting Continuous;125 mmHg 24 08   Dressing Type Black Foam (Regular) 24 08   Number of Foam Pieces Used 1 24 08   Canister Changed Yes 24 08           Wound 23 Full Thickness Wound Open Incision Abdomen (Active)   Wound Image   24 0930   Site Assessment Red;Pink;Early/partial granulation;Epithelialization 24 08   Periwound Assessment Scar tissue 24 08   Margins Unattached edges 24 08   Closure Secondary intention 24 08   Drainage Amount Moderate 24 08   Drainage Description Serosanguineous  "01/08/24 0800   Treatments Cleansed;Nonselective debridement;Site care 01/08/24 0800   Wound Cleansing Hypochlorus Acid 01/08/24 0800   Periwound Protectant Skin Protectant Wipes to Periwound;Drape 01/08/24 0800   Dressing Changed Changed 01/08/24 0800   Dressing Cleansing/Solutions Not Applicable 01/08/24 0800   Dressing Options Wound Vac 01/08/24 0800   Dressing Change/Treatment Frequency Monday, Wednesday, Friday, and As Needed 01/08/24 0800   Wound Team Following 3x Weekly 01/08/24 0800   Non-staged Wound Description Full thickness 01/08/24 0800   Wound Length (cm) 19 cm 01/03/24 0930   Wound Width (cm) 1.4 cm 01/03/24 0930   Wound Depth (cm) 1.4 cm 01/03/24 0930   Wound Surface Area (cm^2) 26.6 cm^2 01/03/24 0930   Wound Volume (cm^3) 37.24 cm^3 01/03/24 0930   Post-Procedure Length (cm) 19 cm 01/03/24 0930   Post-Procedure Width (cm) 1.4 cm 01/03/24 0930   Post-Procedure Depth (cm) 1.4 cm 01/03/24 0930   Post-Procedure Surface Area (cm^2) 26.6 cm^2 01/03/24 0930   Post-Procedure Volume (cm^3) 37.24 cm^3 01/03/24 0930   Wound Healing % 87 01/03/24 0930   Wound Bed Granulation (%) 100 % 12/18/23 1355   Tunneling (cm) 0 cm 01/08/24 0800   Undermining (cm) 0 cm 01/08/24 0800   Wound Odor None 01/08/24 0800   Exposed Structures None 01/08/24 0800            OBJECTIVE: 2 piece 57mm flat ctf with paste ring intact with minimal wear on ring after 3 days    STOMA ASSESSMENT:   Type:  ____Ileostomy     __ X__Colostomy    ____Urostomy    ____Other     Location:  LLQ    Size: <1 3/8\"  (about 34mm)   Shape: Round   Color: Moist red   Protrudes:      _x__ >1 inch               ___ <1 inch   [place]: Central   Mucocutaneous Junction: Separation at medial edge, improving since last visit   Skin indentations, creases or scar tissue: When sitting in scant bowl   Danielle-stomal Skin Problems: Mild irritation from wear on barrier   Effluent / Flatus: Brown - thick   Photo  ____ Yes _X__ No    Pouching Procedure:               Old " "appliance removed with medical adhesive remover (5480) and by pushing skin away from the appliance the prevent skin tears               Peristomal skin cleansed with: Warm water & gauze              Peristomal skin treated with: Stoma powder (8406) & no sting skin prep crusting to medial MCJ              Ostomy appliance used:  2\" slim paste ring (1615), 2 piece ctf flat 57mm (85763) & matching pouch (53026)       Patient Education:     Patient did the ostomy appliance change today with minimal assistance from this RN. He did use the mirror some, but primarily looked down on his stoma and was able to measure his stoma, to cut the hole in the barrier, to crust and to place the appliance. Patient needed prompting to position barrier correctly to remove plastic backing before applying paste ring.    Patient to change again on Friday so that he gets a little more practice with RN support.     Change ileostomies every 3-5 days. Change appliance immediately if it is leaking or peristomal skin feels irritated, has itching, or  burning.   To change the appliance, remove previous appliance, cleanse peristomal skin with warm water/washcloth, pat dry, make an ostomy template or use cardboard measuring guide and trace ostomy shape onto back of barrier, cut out barrier, apply a paste ring around barrier opening and apply appliance. Empty pouches when no more than ½ full. Check contents every 2 hours or as needed. Do not leave soap residue on tissue and do not use baby wipes or skin prep wipes.     Should you experience any significant changes in your wound(s), such as infection (redness, swelling, localized heat, increased pain, fever > 101 F, chills) or have any questions regarding your home care instructions, please contact the wound center at [phone]. If after hours, contact your primary care physician or go to the hospital emergency room.   Keep dressing clean, dry and cover when bathing. Only change dressing if it's " over saturated, soiled or falls off.    Eat protein, stay hydrated, get rest for wound healing. Do no lift anything more than 10 pounds, but you can go for walks. Call [facility] to make a follow-up with your surgeon.   Verbal/demonstration instructions of above pouching procedure provided to patient/family.      Response:       PLAN: Return 3x/week for NPWT; smaller appliance working well to keep ostomy change separate from wound. At Monday and Friday's appointments, have patient practice changing ostomy appliance independently while standing; and if he still likes the 57 mm, can order supplies.     Supplies Needed:   Appliance type:    Mode as noted above             Other:      Accessories:         Supplier: to be decided, possibly [employer]      Professional Collaboration: None today    At the time of each visit, a thorough assessment of the patient is completed to assure appropriateness of our plan of care.  The plan of care may need to be adapted from the original plan of care to address any significant changes in patient status.

## 2024-01-08 NOTE — PROGRESS NOTES
NPWT dressing changed to midline abdomen wound, < 50 cm ².   - 2 piece(s) of black foam removed. No residual foam noted in wound bed.   -1 piece of black foam used.   - Resumed suction at 125 mm Hg, no leaks noted.  Patient tolerated well. Please see ostomy note for updated POC for patient's ostomy needs.   
22

## 2024-01-10 ENCOUNTER — NON-PROVIDER VISIT (OUTPATIENT)
Dept: WOUND CARE | Facility: MEDICAL CENTER | Age: 49
End: 2024-01-10
Attending: SURGERY
Payer: COMMERCIAL

## 2024-01-10 PROCEDURE — 97605 NEG PRS WND THER DME<=50SQCM: CPT

## 2024-01-10 NOTE — PATIENT INSTRUCTIONS
-Keep your wound dressing clean, dry, and intact.    -Change your dressing if it becomes soiled, soaked, or falls off.    -Wound vac may not have any drainage in tube or cannister & it will still be working.   Change cannister if it does become full by pressing tab on side of machine to remove canister and snap on new one. Full canister can be thrown in the trash. If cannister fills with bright red blood - go to ER. Dressing will be changed every MWF at the wound clini.  If you are having issues with your wound VAC, please consider patching leaks, changing the canister, or calling [phone] for troubleshooting. If the wound VAC has been off or un-operational for over 2 hours, call wound care center to inform them and remove all dressings including black foam and replace with normal saline damp gauze.     -Should you experience any significant changes in your wound(s), such as infection (redness, swelling, localized heat, increased pain, fever > 101 F, chills) or have any questions regarding your home care instructions, please contact the wound center at [phone]. If after hours, contact your primary care physician or go to the hospital emergency room.

## 2024-01-10 NOTE — PROGRESS NOTES
Non-selective debridement to midline abdomen wound and dewey-wound using Hypochlorous acid and gauze to remove biofilm and non-viable tissue.     NPWT dressing changed to midline abdomen wound, < 50 cm ².   - 1 piece of black foam removed. No residual foam noted in wound bed.   -1 piece of black foam used.   - Resumed suction at 125 mm Hg, no leaks noted.  Patient tolerated well.     No ostomy change today.

## 2024-01-12 ENCOUNTER — OFFICE VISIT (OUTPATIENT)
Dept: WOUND CARE | Facility: MEDICAL CENTER | Age: 49
End: 2024-01-12
Attending: SURGERY
Payer: COMMERCIAL

## 2024-01-12 VITALS
DIASTOLIC BLOOD PRESSURE: 79 MMHG | HEART RATE: 80 BPM | SYSTOLIC BLOOD PRESSURE: 115 MMHG | TEMPERATURE: 96.7 F | RESPIRATION RATE: 18 BRPM | OXYGEN SATURATION: 96 %

## 2024-01-12 DIAGNOSIS — Z43.3 COLOSTOMY CARE (HCC): ICD-10-CM

## 2024-01-12 DIAGNOSIS — R52 PAIN ASSOCIATED WITH WOUND: ICD-10-CM

## 2024-01-12 DIAGNOSIS — T14.8XXA PAIN ASSOCIATED WITH WOUND: ICD-10-CM

## 2024-01-12 DIAGNOSIS — S31.109D OPEN WOUND OF ABDOMEN, SUBSEQUENT ENCOUNTER: ICD-10-CM

## 2024-01-12 PROCEDURE — 11042 DBRDMT SUBQ TIS 1ST 20SQCM/<: CPT | Performed by: NURSE PRACTITIONER

## 2024-01-12 PROCEDURE — 11042 DBRDMT SUBQ TIS 1ST 20SQCM/<: CPT

## 2024-01-12 PROCEDURE — 3074F SYST BP LT 130 MM HG: CPT | Performed by: NURSE PRACTITIONER

## 2024-01-12 PROCEDURE — 3078F DIAST BP <80 MM HG: CPT | Performed by: NURSE PRACTITIONER

## 2024-01-12 PROCEDURE — 99213 OFFICE O/P EST LOW 20 MIN: CPT

## 2024-01-12 PROCEDURE — 99213 OFFICE O/P EST LOW 20 MIN: CPT | Mod: 25 | Performed by: NURSE PRACTITIONER

## 2024-01-13 NOTE — PROGRESS NOTES
Provider Encounter- Full Thickness wound                                                                    Ostomy  HISTORY OF PRESENT ILLNESS  Wound History:    START OF CARE IN CLINIC: 12/18/23    REFERRING PROVIDER: Ariel Caal MD     WOUND- Full Thickness Wound   LOCATION: midline abdomen   Ostomy: End colostomy LLQ  SURGICAL HISTORY: Sigmoid colon resection with end colostomy, VAC placement on 12/8/2023 by Dr. Caal   HISTORY: Had severe abdominal pain with nausea and vomiting.  Presented to ED 12/8/2023 and was admitted until 12/15/2023 for pneumoperitoneum and perforated sigmoid colon as seen on CT.  Underwent surgery listed above.  Was on Zosyn for 4 days for positive peritoneal culture of E. coli.  He was seen by wound team for VAC changes and ostomy care.  He tolerated diet, colostomy was functioning and patient discharged to home with referral to outpatient wound care clinic for both treatment of his wound and ostomy.    Pertinent Medical History: History of cancer to neck and throat s/p chemo and radiation, followed by surgical removal of tumor.  Was informed he is cancer free..     TOBACCO USE:  former. Stopped smoking 20years ago  No alcohol use  No drug use  Occupation:     Patient's problem list, allergies, and current medications reviewed and updated in Epic    Interval History:  12/22/23: Initial provider visit for VAC/ostomy. Clinic visit with Kathe MCKEON, CECILIO-BC, YAA, DERIAN.  Pt denies fevers, chills, nausea, vomiting.   No leaks from ostomy appliance.  Appliance being changed 3 times a week at VAC change appointments.  Patient beginning hands-on teaching.  He is emptying his appliance about 2-3 times per day.  Does have brown soft stool. patient is eating regular diet.  Ambulating without difficulty.  Currently taking time off of work.  Patient has follow-up with general surgery APRN on 12/26/2023.  Currently not on antibiotics.    1/3/2024: Clinic visit with  Richard Robledo MD. Patient reports doing well, no issues with VAC this week and tolerating well. Wound is measuring smaller with improved granulation tissue, thin layer of biofilm. Patients ostomy has been functioning well. No leaks from appliance. Was changed on Monday, will defer to be changed on Friday and will be working with ostomy nurses to become more independent in care.    1/12/2024 : Clinic visit with LINDA Tabares, CECILIO-BC, HAMIDAN, DERIAN.   Patient states he is feeling well.  His abdominal wound is progressing well, to the point where VAC can be discontinued and topical dressings applied.  Because of the close proximity of his colostomy to his wound, he has not been able to change his own ostomy appliance without disrupting VAC.  Now that VAC has been stopped, he should be able to assume or management of his ostomy.   States he is eating well, regular diet, weight stable.  He is emptying his pouch 1-2 times per day.  No problems with leakage.    REVIEW OF SYSTEMS:   Unchanged from previous wound clinic assessment on 1/3/2024, except as noted in interval history above      PHYSICAL EXAMINATION:   /79   Pulse 80   Temp 35.9 °C (96.7 °F) (Temporal)   Resp 18   SpO2 96%     Physical Exam  Vitals reviewed.   Constitutional:       Appearance: Normal appearance.   Cardiovascular:      Rate and Rhythm: Normal rate.   Abdominal:      General: Bowel sounds are normal. There is no distension.      Palpations: Abdomen is soft.      Tenderness: There is no abdominal tenderness (Improving).      Comments: LLQ colostomy: Stoma pink and moist, well budded, MCJ healed, peristomal skin intact and of normal skin tone.   Skin:     Comments: Midline abdomen full thickness wound: Wound area and depth have decreased, granulating well, thin layer of slough, moderate serosanguineous drainage, no evidence of infection   Neurological:      Mental Status: He is alert.         WOUND ASSESSMENT  Wound 12/08/23 Full  Thickness Wound Open Incision Abdomen (Active)   Wound Image    01/12/24 1600   Site Assessment Red;Yellow;Early/partial granulation 01/12/24 1600   Periwound Assessment Scar tissue;Intact 01/12/24 1600   Margins Attached edges 01/12/24 1600   Closure Secondary intention 01/12/24 1600   Drainage Amount Moderate 01/12/24 1600   Drainage Description Serosanguineous 01/12/24 1600   Treatments Cleansed;Provider debridement 01/12/24 1600   Wound Cleansing Hypochlorus Acid 01/12/24 1600   Periwound Protectant Skin Protectant Wipes to Periwound;Barrier Paste 01/12/24 1600   Dressing Changed New 01/12/24 1600   Dressing Cleansing/Solutions Not Applicable 01/12/24 1600   Dressing Options Collagen Dressing;Hydrofera Blue Ready;Hypafix Tape 01/12/24 1600   Dressing Change/Treatment Frequency Monday, Wednesday, Friday, and As Needed 01/12/24 1600   Wound Team Following Weekly 01/12/24 1600   Non-staged Wound Description Full thickness 01/12/24 1600   Wound Length (cm) 14 cm 01/12/24 1600   Wound Width (cm) 1.1 cm 01/12/24 1600   Wound Depth (cm) 0.4 cm 01/12/24 1600   Wound Surface Area (cm^2) 15.4 cm^2 01/12/24 1600   Wound Volume (cm^3) 6.16 cm^3 01/12/24 1600   Post-Procedure Length (cm) 14.1 cm 01/12/24 1600   Post-Procedure Width (cm) 1.1 cm 01/12/24 1600   Post-Procedure Depth (cm) 0.4 cm 01/12/24 1600   Post-Procedure Surface Area (cm^2) 15.51 cm^2 01/12/24 1600   Post-Procedure Volume (cm^3) 6.204 cm^3 01/12/24 1600   Wound Healing % 98 01/12/24 1600   Wound Bed Granulation (%) 100 % 12/18/23 1355   Tunneling (cm) 0 cm 01/12/24 1600   Undermining (cm) 0 cm 01/12/24 1600   Wound Odor None 01/12/24 1600   Exposed Structures None 01/12/24 1600   Number of days: 35       PROCEDURE: Excisional debridement of midline wound  -2% viscous lidocaine applied topically to wound bed for approximately 5 minutes prior to debridement  -Curette used to debride wound bed.  Excisional debridement was performed to remove devitalized tissue  "until healthy, bleeding tissue was visualized.   Entire surface of wound, 15.51 cm² debrided.  Tissue debrided into the subcutaneous layer.    -Bleeding controlled with manual pressure.    -Wound care completed by wound RN, refer to flowsheet  -Patient tolerated the procedure well, without c/o pain or discomfort.               Pertinent Labs and Diagnostics:    Labs:     A1c: No results found for: \"HBA1C\"       IMAGING: CT preop 12/8/2023  1. Moderate free intraperitoneal air, concerning for perforated viscus. There is free fluid surrounding the liver and in bilateral lower quadrant as well. The exact location of the perforation is unknown.   2. Significantly dilated rectum with large amount of stool and decompress sigmoid colon. This could relate to stercoral colitis and could be a potential area for perforation.   3. A 3.7 cm right adrenal nodule with peripheral calcified wall, nonspecific but could be a pseudocyst or chronic hematoma.    VASCULAR STUDIES: N/A    LAST  WOUND CULTURE:  DATE :   Lab Results   Component Value Date/Time    CULTRSULT - (A) 12/08/2023 07:19 PM    CULTRSULT Escherichia coli  Rare growth   (A) 12/08/2023 07:19 PM    CULTRSULT - (A) 12/08/2023 07:19 PM    CULTRSULT Bacteroides stercoris group  Light growth   (A) 12/08/2023 07:19 PM    CULTRSULT Bacteroides thetaiotaomicron group  Light growth   (A) 12/08/2023 07:19 PM         ASSESSMENT AND PLAN:     1. Open wound of abdomen, subsequent encounter    1/12/2024: Wound area and depth have decreased significantly.  - Excisional debridement performed today.  Medically necessary to promote wound healing.  -Discontinue VAC due to adequate healing  - No evidence of wound infection, continue to monitor.  -Patient reports he is eating well, weight is stable.  Stressed importance of adequate protein and calorie intake,   - Decrease frequency of clinic visits to 1 time per week.  -Patient is to change his dressing 1 time per week in between clinic " visits, and as needed.  Instruction provided in clinic today  -Home wound care supplies ordered    Wound care: Collagen to wound bed, Hydrofera Blue to manage exudate and bioburden, foam cover dressing, Hypafix tape     2. Pain associated with wound    1/12/2024: Patient was able to tolerate significant debridement today using topical anesthesia  -2% viscous lidocaine applied topically to wound bed for approximately 5 minutes prior to debridement  -Patient tolerated procedure today with no complaints of discomfort.     3. Colostomy care ([facility])    1/12/2024: Patient has been independent with emptying his appliance.  He has not been able to change appliance due to close proximity of wound to his stoma.  Now that VAC has been discontinued, he will have easier access.  -Ostomy education provided in clinic today  -Current pouching system appears to be appropriate, no leaks, adequate wear time.  -Patient to continue jakob be seen in clinic weekly for ostomy care and education along with wound care.        PATIENT EDUCATION  - Importance of adequate nutrition for wound healing  -Advised to go to ER for any increased redness, swelling, drainage, or odor, or if patient develops fever, chills, nausea or vomiting.     My total time spent caring for the patient on the day of the encounter was 20 minutes.   This does not include time spent on separately billable procedures/tests.     Please note that this note may have been created using voice recognition software. I have worked with technical experts from [employer] to optimize the interface.  I have made every reasonable attempt to correct obvious errors, but there may be errors of grammar and possibly content that I did not discover before finalizing the note.    N

## 2024-01-13 NOTE — PATIENT INSTRUCTIONS
Change colostomies every 5-7 days. Change appliance immediately if it is leaking or peristomal skin feels irritated, has itching, or  burning.   To change the appliance, remove previous appliance, cleanse peristomal skin with warm water/washcloth, pat dry, make an ostomy template or use cardboard measuring guide and trace ostomy shape onto back of barrier, cut out barrier, apply a paste ring around barrier opening and apply appliance. Empty pouches when no more than ½ full. Check contents every 2 hours or as needed. Do not leave soap residue on tissue and do not use baby wipes or skin prep wipes.     Should you experience any significant changes in your condition, such as infection (redness, swelling, localized heat, increased pain, fever > 101 F, chills) or have any questions regarding your home care instructions, please contact the wound center at [phone]. If after hours, contact your primary care physician or go to the hospital emergency room.     -Keep your wound dressing clean, dry, and intact.    -Change your dressing if it becomes soiled, soaked, or falls off.

## 2024-01-13 NOTE — WOUND TEAM
"Advanced Wound Care  [facility]  [address]  [place] [address]  [phone] Fax#    Ostomy Discharge  For 90 Day Certification Period:  01/12/2024 - 04/12/2024    Referring Provider:  Ariel Caal M.D   Primary Provider: Karan Blair D.O  Consulting Providers: LINDA Villalpando  Surgeon: Ariel Caal M.D     Start of Care: 12/18/23  Reason for referral: new colostomy & midline wound    SUBJECTIVE:  \"It's been fine.\"    HPI: 48 year old male presents with new colostomy and midline abdominal wound. On 12/8/23, patient presented to the ER with severe abdominal pain that started that morning and increased after vomiting. Patient was found to have free intraperitoneal air. That same day, Dr. Caal preformed a Sigmoid colon resection with end colostomy and application of midline abdominal wound NPWT due to Perforated sigmoid colon & feculent peritonitis. Patient was seen during his hospital stay by the IP wound team for NPWT changes and ostomy education.     Past Medical Hx:   Past Medical History:   Diagnosis Date    Cancer (HCC)       Surgical Hx:   Past Surgical History:   Procedure Laterality Date    MI LAP,DIAGNOSTIC ABDOMEN  12/8/2023    Procedure: SIGMOID COLECTOMY WITH COLOSTOMY CREATION;  Surgeon: Ariel Caal M.D.;  Location: SURGERY [facility];  Service: General    TONSILLECTOMY  2/6/2015    Performed by Fernando Fair M.D. at SURGERY SAME DAY [facility] ORS    LARYNGOSCOPY  2/6/2015    Performed by Fernando Fair M.D. at SURGERY SAME DAY [facility] ORS    LYMPH NODE EXCISION  1/30/2015    Performed by Fernando Fair M.D. at SURGERY SAME DAY [facility] ORS      Medications:   Current Outpatient Medications on File Prior to Visit   Medication Sig Dispense Refill    acetaminophen (TYLENOL) 500 MG Tab Take 2 Tablets by mouth every 6 hours as needed for Moderate Pain. Take as directed on the bottle.  Take as needed for pain 30 Tablet 0    " "levothyroxine (SYNTHROID) 150 MCG Tab Take 150 mcg by mouth at bedtime.       No current facility-administered medications on file prior to visit.      Allergies: Patient has no known allergies.   Social Hx:   Social History     Socioeconomic History    Marital status: Single     Spouse name: Not on file    Number of children: Not on file    Years of education: Not on file    Highest education level: Not on file   Occupational History    Not on file   Tobacco Use    Smoking status: Former     Current packs/day: 0.00     Average packs/day: 1 pack/day for 15.0 years (15.0 ttl pk-yrs)     Types: Cigarettes     Start date: 1993     Quit date: 2008     Years since quittin.0    Smokeless tobacco: Never   Vaping Use    Vaping Use: Never used   Substance and Sexual Activity    Alcohol use: No    Drug use: No    Sexual activity: Not on file   Other Topics Concern    Not on file   Social History Narrative    Not on file     Social Determinants of Health     Financial Resource Strain: Not on file   Food Insecurity: Not on file   Transportation Needs: Not on file   Physical Activity: Not on file   Stress: Not on file   Social Connections: Not on file   Intimate Partner Violence: Not on file   Housing Stability: Not on file      OBJECTIVE: 2 piece 57mm flat ctf with paste ring intact with minimal wear on ring after 4 days    STOMA ASSESSMENT:   Type:  ____Ileostomy     __ X__Colostomy    ____Urostomy    ____Other     Location:  LLQ    Size: <1 3/8\"  (about 34mm)   Shape: Round   Color: Moist red   Protrudes:      _x__ >1 inch               ___ <1 inch   [place]: Central   Mucocutaneous Junction: Separation at medial edge resolved   Skin indentations, creases or scar tissue: When sitting in scant bowl   Danielle-stomal Skin Problems: None   Effluent / Flatus: Brown - thick   Photo  __X_ Yes ___ No    Pouching Procedure:               Old appliance removed with medical adhesive remover (7731) and by pushing skin away from " "the appliance the prevent skin tears               Peristomal skin cleansed with: Warm water & gauze              Peristomal skin treated with: n/a today              Ostomy appliance used:  2\" slim paste ring (3815), 2 piece ctf flat 57mm (22417) & matching pouch (28200)       Patient Education:     Patient did the ostomy appliance change today with minimal assistance from this RN. He is independent with ostomy care and will be d/c'd from ostomy services. He will still be seen for his abdominal wound.      Change colostomies every 5-7 days. Change appliance immediately if it is leaking or peristomal skin feels irritated, has itching, or  burning.   To change the appliance, remove previous appliance, cleanse peristomal skin with warm water/washcloth, pat dry, make an ostomy template or use cardboard measuring guide and trace ostomy shape onto back of barrier, cut out barrier, apply a paste ring around barrier opening and apply appliance. Empty pouches when no more than ½ full. Check contents every 2 hours or as needed. Do not leave soap residue on tissue and do not use baby wipes or skin prep wipes.     Should you experience any significant changes in your wound(s), such as infection (redness, swelling, localized heat, increased pain, fever > 101 F, chills) or have any questions regarding your home care instructions, please contact the wound center at [phone]. If after hours, contact your primary care physician or go to the hospital emergency room.   Keep dressing clean, dry and cover when bathing. Only change dressing if it's over saturated, soiled or falls off.    Eat protein, stay hydrated, get rest for wound healing. Do no lift anything more than 10 pounds, but you can go for walks. Call [facility] to make a follow-up with your surgeon.   Verbal/demonstration instructions of above pouching procedure provided to patient/family.      Response:       PLAN: Weekly for wound care only.    Supplies " Needed:   Appliance type:    Mode as noted above             Other:      Accessories:         Supplier: Order placed with [employer] today.      Professional Collaboration: LINDA Kaufman    At the time of each visit, a thorough assessment of the patient is completed to assure appropriateness of our plan of care.  The plan of care may need to be adapted from the original plan of care to address any significant changes in patient status.

## 2024-01-13 NOTE — WOUND TEAM
Advanced Wound Care   [facility]   [address]   [address]   [place] [address]   [phone] Fax: [phone]        Duration of Supply Order: 90 Days  Dispense as written.    Wound 12/08/23 Full Thickness Wound Open Incision Abdomen (Active)   Wound Image    01/12/24 1600   Site Assessment Red;Yellow;Early/partial granulation 01/12/24 1600   Periwound Assessment Scar tissue;Intact 01/12/24 1600   Margins Attached edges 01/12/24 1600   Closure Secondary intention 01/12/24 1600   Drainage Amount Moderate 01/12/24 1600   Drainage Description Serosanguineous 01/12/24 1600   Treatments Cleansed;Provider debridement 01/12/24 1600   Wound Cleansing Hypochlorus Acid 01/12/24 1600   Periwound Protectant Skin Protectant Wipes to Periwound;Barrier Paste 01/12/24 1600   Dressing Changed New 01/12/24 1600   Dressing Cleansing/Solutions Not Applicable 01/12/24 1600   Dressing Options Collagen Dressing;Hydrofera Blue Ready;Hypafix Tape 01/12/24 1600   Dressing Change/Treatment Frequency Monday, Wednesday, Friday, and As Needed 01/12/24 1600   Wound Team Following Weekly 01/12/24 1600   Non-staged Wound Description Full thickness 01/12/24 1600   Wound Length (cm) 14 cm 01/12/24 1600   Wound Width (cm) 1.1 cm 01/12/24 1600   Wound Depth (cm) 0.4 cm 01/12/24 1600   Wound Surface Area (cm^2) 15.4 cm^2 01/12/24 1600   Wound Volume (cm^3) 6.16 cm^3 01/12/24 1600   Post-Procedure Length (cm) 14.1 cm 01/12/24 1600   Post-Procedure Width (cm) 1.1 cm 01/12/24 1600   Post-Procedure Depth (cm) 0.4 cm 01/12/24 1600   Post-Procedure Surface Area (cm^2) 15.51 cm^2 01/12/24 1600   Post-Procedure Volume (cm^3) 6.204 cm^3 01/12/24 1600   Wound Healing % 98 01/12/24 1600   Wound Bed Granulation (%) 100 % 12/18/23 1355   Tunneling (cm) 0 cm 01/12/24 1600   Undermining (cm) 0 cm 01/12/24 1600   Wound Odor None 01/12/24 1600   Exposed Structures None 01/12/24 1600         I agree with current plan of  care.    SIGNATURE:_______________________________________ DATE:________________    PROVIDER NAME:__________________________________

## 2024-01-15 ENCOUNTER — OFFICE VISIT (OUTPATIENT)
Dept: WOUND CARE | Facility: MEDICAL CENTER | Age: 49
End: 2024-01-15
Attending: SURGERY
Payer: COMMERCIAL

## 2024-01-15 VITALS
RESPIRATION RATE: 16 BRPM | OXYGEN SATURATION: 97 % | HEART RATE: 97 BPM | TEMPERATURE: 97.1 F | DIASTOLIC BLOOD PRESSURE: 62 MMHG | SYSTOLIC BLOOD PRESSURE: 112 MMHG

## 2024-01-15 DIAGNOSIS — Z43.3 COLOSTOMY CARE (HCC): ICD-10-CM

## 2024-01-15 DIAGNOSIS — S31.109D OPEN WOUND OF ABDOMEN, SUBSEQUENT ENCOUNTER: ICD-10-CM

## 2024-01-15 DIAGNOSIS — T14.8XXA PAIN ASSOCIATED WITH WOUND: ICD-10-CM

## 2024-01-15 DIAGNOSIS — R52 PAIN ASSOCIATED WITH WOUND: ICD-10-CM

## 2024-01-15 PROCEDURE — 97602 WOUND(S) CARE NON-SELECTIVE: CPT

## 2024-01-15 NOTE — PROGRESS NOTES
Non-selective debridement to midline abdomen wound and dewey-wound using normal saline and gauze to remove biofilm and non-viable tissue.     Wound Vac discontinued.  Patient is now independent with ostomy care.

## 2024-01-15 NOTE — PATIENT INSTRUCTIONS
-Keep your wound dressing clean, dry, and intact.    -Change your dressing if it becomes soiled, soaked, or falls off.    -Should you experience any significant changes in your wound(s), such as infection (redness, swelling, localized heat, increased pain, fever > 101 F, chills) or have any questions regarding your home care instructions, please contact the wound center at [phone]. If after hours, contact your primary care physician or go to the hospital emergency room.

## 2024-01-15 NOTE — PROGRESS NOTES
Patient referred to [facility] for treatment of midline abdominal wound and ostomy education and assistance.  Patient was seen by provider 3 days ago, excisionally debrided, and wound VAC was discontinued.  Does not have home health, therefore does not need to be seen by provider.  Wound care and ostomy care and education by RN.

## 2024-01-17 ENCOUNTER — APPOINTMENT (OUTPATIENT)
Dept: WOUND CARE | Facility: MEDICAL CENTER | Age: 49
End: 2024-01-17
Attending: SURGERY
Payer: COMMERCIAL

## 2024-01-19 ENCOUNTER — APPOINTMENT (OUTPATIENT)
Dept: WOUND CARE | Facility: MEDICAL CENTER | Age: 49
End: 2024-01-19
Attending: SURGERY
Payer: COMMERCIAL

## 2024-01-22 ENCOUNTER — OFFICE VISIT (OUTPATIENT)
Dept: WOUND CARE | Facility: MEDICAL CENTER | Age: 49
End: 2024-01-22
Attending: SURGERY
Payer: COMMERCIAL

## 2024-01-22 VITALS
RESPIRATION RATE: 16 BRPM | TEMPERATURE: 97.2 F | SYSTOLIC BLOOD PRESSURE: 106 MMHG | DIASTOLIC BLOOD PRESSURE: 71 MMHG | HEART RATE: 77 BPM | OXYGEN SATURATION: 100 %

## 2024-01-22 DIAGNOSIS — K63.1 PERFORATION OF SIGMOID COLON (HCC): ICD-10-CM

## 2024-01-22 DIAGNOSIS — Z43.3 COLOSTOMY CARE (HCC): ICD-10-CM

## 2024-01-22 DIAGNOSIS — R52 PAIN ASSOCIATED WITH WOUND: ICD-10-CM

## 2024-01-22 DIAGNOSIS — S31.109D OPEN WOUND OF ABDOMEN, SUBSEQUENT ENCOUNTER: ICD-10-CM

## 2024-01-22 DIAGNOSIS — T14.8XXA PAIN ASSOCIATED WITH WOUND: ICD-10-CM

## 2024-01-22 PROCEDURE — 11042 DBRDMT SUBQ TIS 1ST 20SQCM/<: CPT

## 2024-01-22 PROCEDURE — 3074F SYST BP LT 130 MM HG: CPT | Performed by: NURSE PRACTITIONER

## 2024-01-22 PROCEDURE — 11042 DBRDMT SUBQ TIS 1ST 20SQCM/<: CPT | Performed by: NURSE PRACTITIONER

## 2024-01-22 PROCEDURE — 3078F DIAST BP <80 MM HG: CPT | Performed by: NURSE PRACTITIONER

## 2024-01-23 NOTE — PATIENT INSTRUCTIONS
-Keep your wound dressing clean, dry, and intact.    -Change your dressing if it becomes soiled, soaked, or falls off.    -Should you experience any significant changes in your wound(s), such as infection (redness, swelling, localized heat, increased pain, fever > 101 F, chills) or have any questions regarding your home care instructions, please contact the wound center at [phone]. If after hours, contact your primary care physician or go to the hospital emergency room.

## 2024-01-23 NOTE — PROGRESS NOTES
Provider Encounter- Full Thickness wound                                                                    Ostomy  HISTORY OF PRESENT ILLNESS  Wound History:    START OF CARE IN CLINIC: 12/18/23    REFERRING PROVIDER: Ariel Caal MD     WOUND- Full Thickness Wound   LOCATION: midline abdomen   Ostomy: End colostomy LLQ  SURGICAL HISTORY: Sigmoid colon resection with end colostomy, VAC placement on 12/8/2023 by Dr. Caal   HISTORY: Had severe abdominal pain with nausea and vomiting.  Presented to ED 12/8/2023 and was admitted until 12/15/2023 for pneumoperitoneum and perforated sigmoid colon as seen on CT.  Underwent surgery listed above.  Was on Zosyn for 4 days for positive peritoneal culture of E. coli.  He was seen by wound team for VAC changes and ostomy care.  He tolerated diet, colostomy was functioning and patient discharged to home with referral to outpatient wound care clinic for both treatment of his wound and ostomy.    Pertinent Medical History: History of cancer to neck and throat s/p chemo and radiation, followed by surgical removal of tumor.  Was informed he is cancer free..     TOBACCO USE:  former. Stopped smoking 20years ago  No alcohol use  No drug use  Occupation:     Patient's problem list, allergies, and current medications reviewed and updated in Epic    Interval History:  12/22/23: Initial provider visit for VAC/ostomy. Clinic visit with Kathe MCKEON, CECILIO-BC, YAA, DERIAN.  Pt denies fevers, chills, nausea, vomiting.   No leaks from ostomy appliance.  Appliance being changed 3 times a week at VAC change appointments.  Patient beginning hands-on teaching.  He is emptying his appliance about 2-3 times per day.  Does have brown soft stool. patient is eating regular diet.  Ambulating without difficulty.  Currently taking time off of work.  Patient has follow-up with general surgery APRN on 12/26/2023.  Currently not on antibiotics.    1/3/2024: Clinic visit with  Richard Robledo MD. Patient reports doing well, no issues with VAC this week and tolerating well. Wound is measuring smaller with improved granulation tissue, thin layer of biofilm. Patients ostomy has been functioning well. No leaks from appliance. Was changed on Monday, will defer to be changed on Friday and will be working with ostomy nurses to become more independent in care.    1/12/2024 : Clinic visit with LINDA Tabares, VERENA, GISELLA, DERIAN.   Patient states he is feeling well.  His abdominal wound is progressing well, to the point where VAC can be discontinued and topical dressings applied.  Because of the close proximity of his colostomy to his wound, he has not been able to change his own ostomy appliance without disrupting VAC.  Now that VAC has been stopped, he should be able to assume or management of his ostomy.   States he is eating well, regular diet, weight stable.  He is emptying his pouch 1-2 times per day.  No problems with leakage.    1/22/24: Clinic visit with Kathe MCKEON, VERENA, YAA, DERIAN.  Pt denies fevers, chills, nausea, vomiting.  Wound has decreased in size, hypergranular tissue near umbilicus.  Has follow-up appointment with his surgeon 1/26/2024 to discuss plans for reversal and return to work.  Changing colostomy appliance about every 5 days.  Denies any leaks.      REVIEW OF SYSTEMS:   Unchanged from previous wound clinic assessment on 1/12/2024, except as noted in interval history above      PHYSICAL EXAMINATION:   /71   Pulse 77   Temp 36.2 °C (97.2 °F) (Temporal)   Resp 16   SpO2 100%     Physical Exam  Vitals reviewed.   Constitutional:       Appearance: Normal appearance.   Cardiovascular:      Rate and Rhythm: Normal rate.   Abdominal:      General: Bowel sounds are normal. There is no distension.      Palpations: Abdomen is soft.      Tenderness: There is no abdominal tenderness (Improving).      Comments: LLQ colostomy: Stoma red, soft firm stool.   Patient independent with ostomy changes.   Skin:     Comments: Midline abdomen full thickness wound: Wound area decreased, hypergranular, biofilm present, no evidence of infection   Neurological:      Mental Status: He is alert.         WOUND ASSESSMENT  Wound 12/08/23 Full Thickness Wound Open Incision Abdomen (Active)   Wound Image    01/22/24 1600   Site Assessment Red;Hypergranulation;Crusted 01/22/24 1600   Periwound Assessment Scar tissue;Dry 01/22/24 1600   Margins Attached edges 01/22/24 1600   Closure Secondary intention 01/15/24 1500   Drainage Amount Small 01/22/24 1600   Drainage Description Serosanguineous 01/22/24 1600   Treatments Cleansed;Topical Lidocaine;Provider debridement;Silver nitrate;Irrigation 01/22/24 1600   Wound Cleansing Normal Saline Irrigation 01/22/24 1600   Periwound Protectant Skin Protectant Wipes to Periwound;Barrier Paste 01/22/24 1600   Dressing Changed New 01/22/24 1600   Dressing Cleansing/Solutions Normal Saline 01/22/24 1600   Dressing Options Hydrofera Blue Classic;Nonadhesive Foam;Hypafix Tape 01/22/24 1600   Dressing Change/Treatment Frequency Every 72 hrs, and As Needed 01/22/24 1600   Wound Team Following Weekly 01/22/24 1600   Non-staged Wound Description Full thickness 01/22/24 1600   Wound Length (cm) 3.5 cm 01/22/24 1600   Wound Width (cm) 0.4 cm 01/22/24 1600   Wound Depth (cm) 0.4 cm 01/15/24 1500   Wound Surface Area (cm^2) 1.4 cm^2 01/22/24 1600   Wound Volume (cm^3) 3.24 cm^3 01/15/24 1500   Post-Procedure Length (cm) 3.5 cm 01/22/24 1600   Post-Procedure Width (cm) 0.4 cm 01/22/24 1600   Post-Procedure Depth (cm) 0.4 cm 01/12/24 1600   Post-Procedure Surface Area (cm^2) 1.4 cm^2 01/22/24 1600   Post-Procedure Volume (cm^3) 6.204 cm^3 01/12/24 1600   Wound Healing % 99 01/15/24 1500   Wound Bed Granulation (%) 100 % 12/18/23 1355   Tunneling (cm) 0 cm 01/22/24 1600   Undermining (cm) 0 cm 01/22/24 1600   Wound Odor None 01/22/24 1600   Exposed Structures None  "01/22/24 1600   Number of days: 45       PROCEDURE: Excisional debridement of midline wound  -2% viscous lidocaine applied topically to wound bed for approximately 5 minutes prior to debridement  -Curette used to debride wound bed, hypergranular tissue.  Excisional debridement was performed to remove devitalized tissue until healthy, bleeding tissue was visualized.   Entire surface of wound, 1.4cm² debrided.  Tissue debrided into the subcutaneous layer.    -Bleeding controlled with manual pressure.    -Silver nitrate used to cauterize residual hypergranular tissue.  -Wound care completed by wound RN, refer to flowsheet  -Patient tolerated the procedure well, without c/o pain or discomfort.               Pertinent Labs and Diagnostics:    Labs:     A1c: No results found for: \"HBA1C\"       IMAGING: CT preop 12/8/2023  1. Moderate free intraperitoneal air, concerning for perforated viscus. There is free fluid surrounding the liver and in bilateral lower quadrant as well. The exact location of the perforation is unknown.   2. Significantly dilated rectum with large amount of stool and decompress sigmoid colon. This could relate to stercoral colitis and could be a potential area for perforation.   3. A 3.7 cm right adrenal nodule with peripheral calcified wall, nonspecific but could be a pseudocyst or chronic hematoma.    VASCULAR STUDIES: N/A    LAST  WOUND CULTURE:  DATE :   Lab Results   Component Value Date/Time    CULTRSULT - (A) 12/08/2023 07:19 PM    CULTRSULT Escherichia coli  Rare growth   (A) 12/08/2023 07:19 PM    CULTRSULT - (A) 12/08/2023 07:19 PM    CULTRSULT Bacteroides stercoris group  Light growth   (A) 12/08/2023 07:19 PM    CULTRSULT Bacteroides thetaiotaomicron group  Light growth   (A) 12/08/2023 07:19 PM         ASSESSMENT AND PLAN:     1. Open wound of abdomen, subsequent encounter    1/22/2024: Wound area decreased.  Tissue hypergranular   - Excisional debridement performed today.  Medically " necessary to promote wound healing.  -Silver nitrate used to cauterize remaining hypergranular tissue   - follow-up weekly  - No evidence of wound infection, continue to monitor.  -Patient reports he is eating well, weight is stable.  Stressed importance of adequate protein and calorie intake,   -Patient is to change his dressing 1 time per week in between clinic visits, and as needed.  Instruction provided in clinic today  -Home wound care supplies previously ordered  -Hold collagen.    Wound care: Hydrofera Blue classic to manage exudate and bioburden, foam cover dressing, Hypafix tape     2. Pain associated with wound    1/22/2024: Patient was able to tolerate significant debridement today using topical anesthesia  -2% viscous lidocaine applied topically to wound bed for approximately 5 minutes prior to debridement  -Patient tolerated procedure today with no complaints of discomfort.     3. Colostomy care ([facility])    1/22/2024: Patient has been independent with emptying his appliance.    -Current pouching system appears to be appropriate, no leaks, adequate wear time.  -Patient to continue to be seen in clinic as needed for ostomy care and education         PATIENT EDUCATION  - Importance of adequate nutrition for wound healing  -Advised to go to ER for any increased redness, swelling, drainage, or odor, or if patient develops fever, chills, nausea or vomiting.         Please note that this note may have been created using voice recognition software. I have worked with technical experts from [employer] to optimize the interface.  I have made every reasonable attempt to correct obvious errors, but there may be errors of grammar and possibly content that I did not discover before finalizing the note.    N

## 2024-01-24 ENCOUNTER — APPOINTMENT (OUTPATIENT)
Dept: WOUND CARE | Facility: MEDICAL CENTER | Age: 49
End: 2024-01-24
Attending: SURGERY
Payer: COMMERCIAL

## 2024-01-26 ENCOUNTER — APPOINTMENT (OUTPATIENT)
Dept: WOUND CARE | Facility: MEDICAL CENTER | Age: 49
End: 2024-01-26
Attending: SURGERY
Payer: COMMERCIAL

## 2024-01-26 ENCOUNTER — OFFICE VISIT (OUTPATIENT)
Dept: SURGERY | Facility: MEDICAL CENTER | Age: 49
End: 2024-01-26
Attending: SURGERY
Payer: COMMERCIAL

## 2024-01-26 VITALS
DIASTOLIC BLOOD PRESSURE: 76 MMHG | HEIGHT: 72 IN | OXYGEN SATURATION: 97 % | HEART RATE: 86 BPM | SYSTOLIC BLOOD PRESSURE: 120 MMHG | BODY MASS INDEX: 27.77 KG/M2 | RESPIRATION RATE: 16 BRPM | WEIGHT: 205 LBS

## 2024-01-26 DIAGNOSIS — K63.1 PERFORATION OF SIGMOID COLON (HCC): ICD-10-CM

## 2024-01-26 PROCEDURE — 3074F SYST BP LT 130 MM HG: CPT | Performed by: SURGERY

## 2024-01-26 PROCEDURE — 99024 POSTOP FOLLOW-UP VISIT: CPT | Performed by: SURGERY

## 2024-01-26 PROCEDURE — 3078F DIAST BP <80 MM HG: CPT | Performed by: SURGERY

## 2024-01-26 ASSESSMENT — FIBROSIS 4 INDEX: FIB4 SCORE: 1.4

## 2024-01-26 NOTE — PROGRESS NOTES
HISTORY OF PRESENT ILLNESS: The patient is a 48 year-old man who returns to the [facility] for routine follow-up after undergoing a sigmoid colectomy with colostomy for perforated diverticulitis on 12/8 by Ariel Caal MD.    CURRENT MEDICATIONS:  Current Outpatient Medications   Medication Sig    acetaminophen (TYLENOL) 500 MG Tab Take 2 Tablets by mouth every 6 hours as needed for Moderate Pain. Take as directed on the bottle.  Take as needed for pain    levothyroxine (SYNTHROID) 150 MCG Tab Take 150 mcg by mouth at bedtime.       PHYSICAL EXAMINATION:  Vital Signs: /76 (BP Location: Right arm)   Pulse 86   Resp 16   Ht 1.829 m (6')   Wt 93 kg (205 lb)   SpO2 97%   Physical Exam  Vitals and nursing note reviewed.   Constitutional:       Appearance: Normal appearance. He is normal weight.   HENT:      Head: Normocephalic.      Mouth/Throat:      Mouth: Mucous membranes are moist.      Pharynx: Oropharynx is clear.   Eyes:      General: No scleral icterus.  Abdominal:      General: Abdomen is flat. Bowel sounds are normal. There is no distension.      Palpations: Abdomen is soft.      Tenderness: There is no abdominal tenderness. There is no guarding or rebound.      Hernia: No hernia is present.      Comments: Midline wound clean - small dressing to periumbilical area  Wound is clean, no sign of infection  LLQ colostomy is clean, pink.  No hernia.   Skin:     General: Skin is warm and dry.      Capillary Refill: Capillary refill takes less than 2 seconds.   Neurological:      General: No focal deficit present.      Mental Status: He is alert.         LABORATORY VALUES:  Lab Results   Component Value Date/Time    WBC 7.0 12/14/2023 05:58 AM    RBC 3.96 (L) 12/14/2023 05:58 AM    HEMOGLOBIN 11.9 (L) 12/14/2023 05:58 AM    HEMATOCRIT 34.4 (L) 12/14/2023 05:58 AM    MCV 86.9 12/14/2023 05:58 AM    MCH 30.1 12/14/2023 05:58 AM    MCHC 34.6 12/14/2023 05:58 AM    MPV 10.1  "12/14/2023 05:58 AM    NEUTSPOLYS 73.10 (H) 12/14/2023 05:58 AM    LYMPHOCYTES 11.30 (L) 12/14/2023 05:58 AM    MONOCYTES 11.30 12/14/2023 05:58 AM    EOSINOPHILS 2.60 12/14/2023 05:58 AM    BASOPHILS 0.40 12/14/2023 05:58 AM    ANISOCYTOSIS 1+ 12/10/2023 04:18 AM     Lab Results   Component Value Date/Time    SODIUM 140 12/14/2023 05:58 AM    POTASSIUM 4.0 12/14/2023 05:58 AM    CHLORIDE 104 12/14/2023 05:58 AM    CO2 28 12/14/2023 05:58 AM    GLUCOSE 101 (H) 12/14/2023 05:58 AM    BUN 16 12/14/2023 05:58 AM    CREATININE 0.97 12/14/2023 05:58 AM     No results found for: \"PROTHROMBTM\", \"INR\"    IMAGING:  No orders to display       PATHOLOGY: Final pathology demonstrated perforated sigmoid colon.    ASSESSMENT AND PLAN:    Return to work note. Discharge from the [facility].  Follow up with [facility] - Colorectal surgery in 2-3 months for evaluation for reversal of colostomy.         ____________________________________     Favian Law M.D.    DD: 1/26/2024  2:11 PM   "

## 2024-01-29 ENCOUNTER — OFFICE VISIT (OUTPATIENT)
Dept: WOUND CARE | Facility: MEDICAL CENTER | Age: 49
End: 2024-01-29
Attending: SURGERY
Payer: COMMERCIAL

## 2024-01-29 VITALS
HEART RATE: 75 BPM | OXYGEN SATURATION: 98 % | RESPIRATION RATE: 16 BRPM | DIASTOLIC BLOOD PRESSURE: 75 MMHG | SYSTOLIC BLOOD PRESSURE: 125 MMHG | TEMPERATURE: 97.4 F

## 2024-01-29 DIAGNOSIS — Z43.3 COLOSTOMY CARE (HCC): ICD-10-CM

## 2024-01-29 DIAGNOSIS — T14.8XXA PAIN ASSOCIATED WITH WOUND: ICD-10-CM

## 2024-01-29 DIAGNOSIS — R52 PAIN ASSOCIATED WITH WOUND: ICD-10-CM

## 2024-01-29 DIAGNOSIS — S31.109D OPEN WOUND OF ABDOMEN, SUBSEQUENT ENCOUNTER: ICD-10-CM

## 2024-01-29 PROCEDURE — 99212 OFFICE O/P EST SF 10 MIN: CPT

## 2024-01-29 PROCEDURE — 99213 OFFICE O/P EST LOW 20 MIN: CPT

## 2024-01-29 PROCEDURE — 99213 OFFICE O/P EST LOW 20 MIN: CPT | Performed by: NURSE PRACTITIONER

## 2024-01-29 PROCEDURE — 3074F SYST BP LT 130 MM HG: CPT | Performed by: NURSE PRACTITIONER

## 2024-01-29 PROCEDURE — 3078F DIAST BP <80 MM HG: CPT | Performed by: NURSE PRACTITIONER

## 2024-01-29 NOTE — PROGRESS NOTES
Provider Encounter- Full Thickness wound                                                                    Ostomy  HISTORY OF PRESENT ILLNESS  Wound History:    START OF CARE IN CLINIC: 12/18/23    REFERRING PROVIDER: Ariel Caal MD     WOUND- Full Thickness Wound   LOCATION: midline abdomen   Ostomy: End colostomy LLQ  SURGICAL HISTORY: Sigmoid colon resection with end colostomy, VAC placement on 12/8/2023 by Dr. Caal   HISTORY: Had severe abdominal pain with nausea and vomiting.  Presented to ED 12/8/2023 and was admitted until 12/15/2023 for pneumoperitoneum and perforated sigmoid colon as seen on CT.  Underwent surgery listed above.  Was on Zosyn for 4 days for positive peritoneal culture of E. coli.  He was seen by wound team for VAC changes and ostomy care.  He tolerated diet, colostomy was functioning and patient discharged to home with referral to outpatient wound care clinic for both treatment of his wound and ostomy.    Pertinent Medical History: History of cancer to neck and throat s/p chemo and radiation, followed by surgical removal of tumor.  Was informed he is cancer free..     TOBACCO USE:  former. Stopped smoking 20years ago  No alcohol use  No drug use  Occupation:     Patient's problem list, allergies, and current medications reviewed and updated in Epic    Interval History:  12/22/23: Initial provider visit for VAC/ostomy. Clinic visit with Kathe MCKEON, CECILIO-BC, YAA, DERIAN.  Pt denies fevers, chills, nausea, vomiting.   No leaks from ostomy appliance.  Appliance being changed 3 times a week at VAC change appointments.  Patient beginning hands-on teaching.  He is emptying his appliance about 2-3 times per day.  Does have brown soft stool. patient is eating regular diet.  Ambulating without difficulty.  Currently taking time off of work.  Patient has follow-up with general surgery APRN on 12/26/2023.  Currently not on antibiotics.    1/3/2024: Clinic visit with  Richard Robledo MD. Patient reports doing well, no issues with VAC this week and tolerating well. Wound is measuring smaller with improved granulation tissue, thin layer of biofilm. Patients ostomy has been functioning well. No leaks from appliance. Was changed on Monday, will defer to be changed on Friday and will be working with ostomy nurses to become more independent in care.    1/12/2024 : Clinic visit with LINDA Tabares, VERENA, GISELLA, DERIAN.   Patient states he is feeling well.  His abdominal wound is progressing well, to the point where VAC can be discontinued and topical dressings applied.  Because of the close proximity of his colostomy to his wound, he has not been able to change his own ostomy appliance without disrupting VAC.  Now that VAC has been stopped, he should be able to assume or management of his ostomy.   States he is eating well, regular diet, weight stable.  He is emptying his pouch 1-2 times per day.  No problems with leakage.    1/22/24: Clinic visit with VERENA England, YAA, DERIAN.  Pt denies fevers, chills, nausea, vomiting.  Wound has decreased in size, hypergranular tissue near umbilicus.  Has follow-up appointment with his surgeon 1/26/2024 to discuss plans for reversal and return to work.  Changing colostomy appliance about every 5 days.  Denies any leaks.    1/29/24: Clinic visit with VERENA [place], YAA, DERIAN.  Pt denies fevers, chills, nausea, vomiting.  Abdominal midline has healed with thin crust to dewey-umbilicus.  Patient followed up with [facility] surgeon Dr. Law last week.  He was given note to return to work, referred to colorectal surgeon for ostomy takedown in about 3 months and discharged from [facility] clinic.  Continues to perform colostomy care and appliance changes independently.  Patient discharged from wound care clinic.  See below for further detail.       REVIEW OF SYSTEMS:   Unchanged from previous wound clinic assessment on  "1/22/2024, except as noted in interval history above      PHYSICAL EXAMINATION:   /75   Pulse 75   Temp 36.3 °C (97.4 °F) (Temporal)   Resp 16   SpO2 98%     Physical Exam  Vitals reviewed.   Constitutional:       Appearance: Normal appearance.   Cardiovascular:      Rate and Rhythm: Normal rate.   Abdominal:      General: There is no distension.      Palpations: Abdomen is soft.      Tenderness: There is no abdominal tenderness.      Comments: LLQ colostomy: Stoma red, soft firm stool.  Patient independent with ostomy changes.   Skin:     Comments: Midline abdomen full thickness wound: Wound area healed with thin crust   Neurological:      Mental Status: He is alert.         WOUND ASSESSMENT         PROCEDURE: .  -Dimethicone lotion, silicone adhesive foam completed by wound RN  -Patient tolerated the procedure well, without c/o pain or discomfort.               Pertinent Labs and Diagnostics:    Labs:     A1c: No results found for: \"HBA1C\"       IMAGING: CT preop 12/8/2023  1. Moderate free intraperitoneal air, concerning for perforated viscus. There is free fluid surrounding the liver and in bilateral lower quadrant as well. The exact location of the perforation is unknown.   2. Significantly dilated rectum with large amount of stool and decompress sigmoid colon. This could relate to stercoral colitis and could be a potential area for perforation.   3. A 3.7 cm right adrenal nodule with peripheral calcified wall, nonspecific but could be a pseudocyst or chronic hematoma.    VASCULAR STUDIES: N/A    LAST  WOUND CULTURE:  DATE :   Lab Results   Component Value Date/Time    CULTRSULT - (A) 12/08/2023 07:19 PM    CULTRSULT Escherichia coli  Rare growth   (A) 12/08/2023 07:19 PM    CULTRSULT - (A) 12/08/2023 07:19 PM    CULTRSULT Bacteroides stercoris group  Light growth   (A) 12/08/2023 07:19 PM    CULTRSULT Bacteroides thetaiotaomicron group  Light growth   (A) 12/08/2023 07:19 PM         ASSESSMENT AND " PLAN:     1. Open wound of abdomen, subsequent encounter    1/29/2024: Wound area resolved with thin crust  - No evidence of wound infection  -No debridement necessary.  -Patient reports he is eating well, weight is stable.  Stressed importance of adequate protein and calorie intake.  -Initiate emollient based lotion such as CeraVe or Eucerin to midline daily.  protect thin crust with nonadhesive Band-Aid until area has completely scarred.  -Patient discharged from wound care clinic.  Discussed with patient that skin is fragile and complete remodeling may take upwards of 6 months to 1 year.  Patient educated that if ulcer recurs or if new ulcer occurs, he is to seek new referral from his PCP.  Verbalized understanding.      Wound care:  emollient based lotion, silicone adhesive foam    2. Pain associated with wound    1/29/2024: Pain resolved      3. Colostomy care ([facility])    1/29/2024: Patient has been independent with appliance changes and care of colostomy  -Current pouching system appears to be appropriate, no leaks, adequate wear time.          PATIENT EDUCATION  - Importance of adequate nutrition for wound healing  -Advised to go to ER for any increased redness, swelling, drainage, or odor, or if patient develops fever, chills, nausea or vomiting.     My total time spent caring for the patient on the day of the encounter was 20 minutes.   This does not include time spent on separately billable procedures/tests.        Please note that this note may have been created using voice recognition software. I have worked with technical experts from [facility] to optimize the interface.  I have made every reasonable attempt to correct obvious errors, but there may be errors of grammar and possibly content that I did not discover before finalizing the note.    N

## 2024-01-30 NOTE — PROGRESS NOTES
Would is healed and Pt discharged from clinic.  Instructed to obtain a new  wound referral if our services are needed in the future.

## 2024-01-30 NOTE — PATIENT INSTRUCTIONS
-Keep your wound dressing clean, dry, and intact.    -Change your dressing if it becomes soiled, soaked, or falls off.    - Resolved wound be fragile for a few days, bathe and dry area gently, only ever regains a maximum of 80% of the tensile strength of the surrounding skin, remodeling of scar can continue for 6mo - a year. Contact PCP for a referral back her if any problems with area opening and draining again.    -Should you experience any significant changes in your wound(s), such as infection (redness, swelling, localized heat, increased pain, fever > 101 F, chills) or have any questions regarding your home care instructions, please contact the wound center at [phone]. If after hours, contact your primary care physician or go to the hospital emergency room.

## 2024-01-31 ENCOUNTER — APPOINTMENT (OUTPATIENT)
Dept: WOUND CARE | Facility: MEDICAL CENTER | Age: 49
End: 2024-01-31
Attending: SURGERY
Payer: COMMERCIAL

## 2024-02-02 ENCOUNTER — APPOINTMENT (OUTPATIENT)
Dept: WOUND CARE | Facility: MEDICAL CENTER | Age: 49
End: 2024-02-02
Payer: COMMERCIAL

## 2024-02-05 ENCOUNTER — APPOINTMENT (OUTPATIENT)
Dept: WOUND CARE | Facility: MEDICAL CENTER | Age: 49
End: 2024-02-05
Attending: SURGERY
Payer: COMMERCIAL

## 2024-02-07 ENCOUNTER — APPOINTMENT (OUTPATIENT)
Dept: WOUND CARE | Facility: MEDICAL CENTER | Age: 49
End: 2024-02-07
Payer: COMMERCIAL

## 2024-02-09 ENCOUNTER — APPOINTMENT (OUTPATIENT)
Dept: WOUND CARE | Facility: MEDICAL CENTER | Age: 49
End: 2024-02-09
Payer: COMMERCIAL

## 2024-08-19 ENCOUNTER — APPOINTMENT (OUTPATIENT)
Dept: ADMISSIONS | Facility: MEDICAL CENTER | Age: 49
End: 2024-08-19
Attending: SURGERY
Payer: COMMERCIAL

## 2024-08-23 ENCOUNTER — PRE-ADMISSION TESTING (OUTPATIENT)
Dept: ADMISSIONS | Facility: MEDICAL CENTER | Age: 49
End: 2024-08-23
Attending: SURGERY
Payer: COMMERCIAL

## 2024-09-09 ENCOUNTER — APPOINTMENT (OUTPATIENT)
Dept: ADMISSIONS | Facility: MEDICAL CENTER | Age: 49
DRG: 337 | End: 2024-09-09
Attending: SURGERY
Payer: COMMERCIAL

## 2024-09-09 DIAGNOSIS — Z01.812 PRE-OPERATIVE LABORATORY EXAMINATION: ICD-10-CM

## 2024-09-09 LAB
BASOPHILS # BLD AUTO: 0.4 % (ref 0–1.8)
BASOPHILS # BLD: 0.03 K/UL (ref 0–0.12)
EOSINOPHIL # BLD AUTO: 0.14 K/UL (ref 0–0.51)
EOSINOPHIL NFR BLD: 1.9 % (ref 0–6.9)
ERYTHROCYTE [DISTWIDTH] IN BLOOD BY AUTOMATED COUNT: 43.7 FL (ref 35.9–50)
HCT VFR BLD AUTO: 45.1 % (ref 42–52)
HGB BLD-MCNC: 15.3 G/DL (ref 14–18)
IMM GRANULOCYTES # BLD AUTO: 0.01 K/UL (ref 0–0.11)
IMM GRANULOCYTES NFR BLD AUTO: 0.1 % (ref 0–0.9)
INR PPP: 0.9 (ref 0.87–1.13)
LYMPHOCYTES # BLD AUTO: 1.33 K/UL (ref 1–4.8)
LYMPHOCYTES NFR BLD: 17.6 % (ref 22–41)
MCH RBC QN AUTO: 30.1 PG (ref 27–33)
MCHC RBC AUTO-ENTMCNC: 33.9 G/DL (ref 32.3–36.5)
MCV RBC AUTO: 88.8 FL (ref 81.4–97.8)
MONOCYTES # BLD AUTO: 0.66 K/UL (ref 0–0.85)
MONOCYTES NFR BLD AUTO: 8.7 % (ref 0–13.4)
NEUTROPHILS # BLD AUTO: 5.39 K/UL (ref 1.82–7.42)
NEUTROPHILS NFR BLD: 71.3 % (ref 44–72)
NRBC # BLD AUTO: 0 K/UL
NRBC BLD-RTO: 0 /100 WBC (ref 0–0.2)
PLATELET # BLD AUTO: 251 K/UL (ref 164–446)
PMV BLD AUTO: 10.6 FL (ref 9–12.9)
PROTHROMBIN TIME: 12.2 SEC (ref 12–14.6)
RBC # BLD AUTO: 5.08 M/UL (ref 4.7–6.1)
WBC # BLD AUTO: 7.6 K/UL (ref 4.8–10.8)

## 2024-09-09 PROCEDURE — 36415 COLL VENOUS BLD VENIPUNCTURE: CPT

## 2024-09-09 PROCEDURE — 85610 PROTHROMBIN TIME: CPT

## 2024-09-09 PROCEDURE — 85025 COMPLETE CBC W/AUTO DIFF WBC: CPT

## 2024-09-18 ENCOUNTER — ANESTHESIA EVENT (OUTPATIENT)
Dept: SURGERY | Facility: MEDICAL CENTER | Age: 49
DRG: 337 | End: 2024-09-18
Payer: COMMERCIAL

## 2024-09-19 ENCOUNTER — HOSPITAL ENCOUNTER (INPATIENT)
Facility: MEDICAL CENTER | Age: 49
LOS: 2 days | DRG: 337 | End: 2024-09-21
Attending: SURGERY | Admitting: SURGERY
Payer: COMMERCIAL

## 2024-09-19 ENCOUNTER — ANESTHESIA (OUTPATIENT)
Dept: SURGERY | Facility: MEDICAL CENTER | Age: 49
DRG: 337 | End: 2024-09-19
Payer: COMMERCIAL

## 2024-09-19 DIAGNOSIS — G89.18 POST-OP PAIN: ICD-10-CM

## 2024-09-19 LAB
ALBUMIN SERPL BCP-MCNC: 4.6 G/DL (ref 3.2–4.9)
ALBUMIN/GLOB SERPL: 1.8 G/DL
ALP SERPL-CCNC: 109 U/L (ref 30–99)
ALT SERPL-CCNC: 27 U/L (ref 2–50)
ANION GAP SERPL CALC-SCNC: 13 MMOL/L (ref 7–16)
AST SERPL-CCNC: 25 U/L (ref 12–45)
BILIRUB SERPL-MCNC: 0.7 MG/DL (ref 0.1–1.5)
BUN SERPL-MCNC: 20 MG/DL (ref 8–22)
CALCIUM ALBUM COR SERPL-MCNC: 9 MG/DL (ref 8.5–10.5)
CALCIUM SERPL-MCNC: 9.5 MG/DL (ref 8.5–10.5)
CHLORIDE SERPL-SCNC: 103 MMOL/L (ref 96–112)
CO2 SERPL-SCNC: 24 MMOL/L (ref 20–33)
CREAT SERPL-MCNC: 1.41 MG/DL (ref 0.5–1.4)
GFR SERPLBLD CREATININE-BSD FMLA CKD-EPI: 61 ML/MIN/1.73 M 2
GLOBULIN SER CALC-MCNC: 2.5 G/DL (ref 1.9–3.5)
GLUCOSE BLD STRIP.AUTO-MCNC: 101 MG/DL (ref 65–99)
GLUCOSE SERPL-MCNC: 95 MG/DL (ref 65–99)
POTASSIUM SERPL-SCNC: 4.1 MMOL/L (ref 3.6–5.5)
PROT SERPL-MCNC: 7.1 G/DL (ref 6–8.2)
SODIUM SERPL-SCNC: 140 MMOL/L (ref 135–145)

## 2024-09-19 PROCEDURE — 700111 HCHG RX REV CODE 636 W/ 250 OVERRIDE (IP): Performed by: STUDENT IN AN ORGANIZED HEALTH CARE EDUCATION/TRAINING PROGRAM

## 2024-09-19 PROCEDURE — 160031 HCHG SURGERY MINUTES - 1ST 30 MINS LEVEL 5: Performed by: SURGERY

## 2024-09-19 PROCEDURE — 700105 HCHG RX REV CODE 258: Performed by: SURGERY

## 2024-09-19 PROCEDURE — 160048 HCHG OR STATISTICAL LEVEL 1-5: Performed by: SURGERY

## 2024-09-19 PROCEDURE — 700102 HCHG RX REV CODE 250 W/ 637 OVERRIDE(OP)

## 2024-09-19 PROCEDURE — 700105 HCHG RX REV CODE 258

## 2024-09-19 PROCEDURE — 0DJD8ZZ INSPECTION OF LOWER INTESTINAL TRACT, VIA NATURAL OR ARTIFICIAL OPENING ENDOSCOPIC: ICD-10-PCS | Performed by: SURGERY

## 2024-09-19 PROCEDURE — 700102 HCHG RX REV CODE 250 W/ 637 OVERRIDE(OP): Performed by: STUDENT IN AN ORGANIZED HEALTH CARE EDUCATION/TRAINING PROGRAM

## 2024-09-19 PROCEDURE — 700111 HCHG RX REV CODE 636 W/ 250 OVERRIDE (IP)

## 2024-09-19 PROCEDURE — 700101 HCHG RX REV CODE 250: Performed by: STUDENT IN AN ORGANIZED HEALTH CARE EDUCATION/TRAINING PROGRAM

## 2024-09-19 PROCEDURE — 8E0W4CZ ROBOTIC ASSISTED PROCEDURE OF TRUNK REGION, PERCUTANEOUS ENDOSCOPIC APPROACH: ICD-10-PCS | Performed by: SURGERY

## 2024-09-19 PROCEDURE — 0DSN4ZZ REPOSITION SIGMOID COLON, PERCUTANEOUS ENDOSCOPIC APPROACH: ICD-10-PCS | Performed by: SURGERY

## 2024-09-19 PROCEDURE — 82962 GLUCOSE BLOOD TEST: CPT

## 2024-09-19 PROCEDURE — 88307 TISSUE EXAM BY PATHOLOGIST: CPT

## 2024-09-19 PROCEDURE — 0DNU3ZZ RELEASE OMENTUM, PERCUTANEOUS APPROACH: ICD-10-PCS | Performed by: SURGERY

## 2024-09-19 PROCEDURE — 80053 COMPREHEN METABOLIC PANEL: CPT

## 2024-09-19 PROCEDURE — 770001 HCHG ROOM/CARE - MED/SURG/GYN PRIV*

## 2024-09-19 PROCEDURE — 88304 TISSUE EXAM BY PATHOLOGIST: CPT | Mod: 59

## 2024-09-19 PROCEDURE — 110371 HCHG SHELL REV 272: Performed by: SURGERY

## 2024-09-19 PROCEDURE — 160035 HCHG PACU - 1ST 60 MINS PHASE I: Performed by: SURGERY

## 2024-09-19 PROCEDURE — 160042 HCHG SURGERY MINUTES - EA ADDL 1 MIN LEVEL 5: Performed by: SURGERY

## 2024-09-19 PROCEDURE — 64488 TAP BLOCK BI INJECTION: CPT | Performed by: SURGERY

## 2024-09-19 PROCEDURE — A9270 NON-COVERED ITEM OR SERVICE: HCPCS | Performed by: STUDENT IN AN ORGANIZED HEALTH CARE EDUCATION/TRAINING PROGRAM

## 2024-09-19 PROCEDURE — 36415 COLL VENOUS BLD VENIPUNCTURE: CPT

## 2024-09-19 PROCEDURE — 502714 HCHG ROBOTIC SURGERY SERVICES: Performed by: SURGERY

## 2024-09-19 PROCEDURE — C1729 CATH, DRAINAGE: HCPCS | Performed by: SURGERY

## 2024-09-19 PROCEDURE — 160002 HCHG RECOVERY MINUTES (STAT): Performed by: SURGERY

## 2024-09-19 PROCEDURE — 160009 HCHG ANES TIME/MIN: Performed by: SURGERY

## 2024-09-19 PROCEDURE — A9270 NON-COVERED ITEM OR SERVICE: HCPCS

## 2024-09-19 RX ORDER — POLYETHYLENE GLYCOL 3350 17 G/17G
238 POWDER, FOR SOLUTION ORAL ONCE
Status: ON HOLD | COMMUNITY
Start: 2024-07-31 | End: 2024-09-21

## 2024-09-19 RX ORDER — BUPIVACAINE HYDROCHLORIDE 2.5 MG/ML
INJECTION, SOLUTION EPIDURAL; INFILTRATION; INTRACAUDAL
Status: COMPLETED | OUTPATIENT
Start: 2024-09-19 | End: 2024-09-19

## 2024-09-19 RX ORDER — HYDROMORPHONE HYDROCHLORIDE 2 MG/ML
INJECTION, SOLUTION INTRAMUSCULAR; INTRAVENOUS; SUBCUTANEOUS PRN
Status: DISCONTINUED | OUTPATIENT
Start: 2024-09-19 | End: 2024-09-19 | Stop reason: SURG

## 2024-09-19 RX ORDER — BISACODYL 5 MG/1
10 TABLET, DELAYED RELEASE ORAL ONCE
Status: ON HOLD | COMMUNITY
Start: 2024-07-31 | End: 2024-09-21

## 2024-09-19 RX ORDER — DIPHENHYDRAMINE HYDROCHLORIDE 50 MG/ML
12.5 INJECTION INTRAMUSCULAR; INTRAVENOUS
Status: DISCONTINUED | OUTPATIENT
Start: 2024-09-19 | End: 2024-09-19 | Stop reason: HOSPADM

## 2024-09-19 RX ORDER — GABAPENTIN 300 MG/1
300 CAPSULE ORAL ONCE
Status: COMPLETED | OUTPATIENT
Start: 2024-09-19 | End: 2024-09-19

## 2024-09-19 RX ORDER — HALOPERIDOL 5 MG/ML
1 INJECTION INTRAMUSCULAR
Status: DISCONTINUED | OUTPATIENT
Start: 2024-09-19 | End: 2024-09-19 | Stop reason: HOSPADM

## 2024-09-19 RX ORDER — ACETAMINOPHEN 500 MG
1000 TABLET ORAL ONCE
Status: COMPLETED | OUTPATIENT
Start: 2024-09-19 | End: 2024-09-19

## 2024-09-19 RX ORDER — HYDROMORPHONE HYDROCHLORIDE 1 MG/ML
0.1 INJECTION, SOLUTION INTRAMUSCULAR; INTRAVENOUS; SUBCUTANEOUS
Status: DISCONTINUED | OUTPATIENT
Start: 2024-09-19 | End: 2024-09-19 | Stop reason: HOSPADM

## 2024-09-19 RX ORDER — EPHEDRINE SULFATE 50 MG/ML
5 INJECTION, SOLUTION INTRAVENOUS
Status: DISCONTINUED | OUTPATIENT
Start: 2024-09-19 | End: 2024-09-19 | Stop reason: HOSPADM

## 2024-09-19 RX ORDER — HYDROMORPHONE HYDROCHLORIDE 1 MG/ML
0.5 INJECTION, SOLUTION INTRAMUSCULAR; INTRAVENOUS; SUBCUTANEOUS
Status: DISCONTINUED | OUTPATIENT
Start: 2024-09-19 | End: 2024-09-21 | Stop reason: HOSPADM

## 2024-09-19 RX ORDER — CEFOTETAN DISODIUM 2 G/20ML
INJECTION, POWDER, FOR SOLUTION INTRAMUSCULAR; INTRAVENOUS PRN
Status: DISCONTINUED | OUTPATIENT
Start: 2024-09-19 | End: 2024-09-19 | Stop reason: SURG

## 2024-09-19 RX ORDER — EPHEDRINE SULFATE 50 MG/ML
INJECTION, SOLUTION INTRAVENOUS PRN
Status: DISCONTINUED | OUTPATIENT
Start: 2024-09-19 | End: 2024-09-19 | Stop reason: SURG

## 2024-09-19 RX ORDER — METRONIDAZOLE 500 MG/1
500 TABLET ORAL 3 TIMES DAILY
Status: ON HOLD | COMMUNITY
Start: 2024-07-31 | End: 2024-09-19

## 2024-09-19 RX ORDER — OXYCODONE HYDROCHLORIDE 10 MG/1
10 TABLET ORAL
Status: DISCONTINUED | OUTPATIENT
Start: 2024-09-19 | End: 2024-09-21 | Stop reason: HOSPADM

## 2024-09-19 RX ORDER — OXYCODONE HCL 5 MG/5 ML
10 SOLUTION, ORAL ORAL
Status: COMPLETED | OUTPATIENT
Start: 2024-09-19 | End: 2024-09-19

## 2024-09-19 RX ORDER — ONDANSETRON 2 MG/ML
INJECTION INTRAMUSCULAR; INTRAVENOUS PRN
Status: DISCONTINUED | OUTPATIENT
Start: 2024-09-19 | End: 2024-09-19 | Stop reason: SURG

## 2024-09-19 RX ORDER — HYDRALAZINE HYDROCHLORIDE 20 MG/ML
5 INJECTION INTRAMUSCULAR; INTRAVENOUS
Status: DISCONTINUED | OUTPATIENT
Start: 2024-09-19 | End: 2024-09-19 | Stop reason: HOSPADM

## 2024-09-19 RX ORDER — SCOLOPAMINE TRANSDERMAL SYSTEM 1 MG/1
1 PATCH, EXTENDED RELEASE TRANSDERMAL
Status: DISCONTINUED | OUTPATIENT
Start: 2024-09-19 | End: 2024-09-19

## 2024-09-19 RX ORDER — NEOMYCIN SULFATE 500 MG/1
1000 TABLET ORAL 3 TIMES DAILY
Status: ON HOLD | COMMUNITY
Start: 2024-07-31 | End: 2024-09-19

## 2024-09-19 RX ORDER — ACETAMINOPHEN 500 MG
1000 TABLET ORAL EVERY 6 HOURS PRN
Status: DISCONTINUED | OUTPATIENT
Start: 2024-09-24 | End: 2024-09-21 | Stop reason: HOSPADM

## 2024-09-19 RX ORDER — ONDANSETRON 2 MG/ML
4 INJECTION INTRAMUSCULAR; INTRAVENOUS EVERY 4 HOURS PRN
Status: DISCONTINUED | OUTPATIENT
Start: 2024-09-19 | End: 2024-09-21 | Stop reason: HOSPADM

## 2024-09-19 RX ORDER — SCOLOPAMINE TRANSDERMAL SYSTEM 1 MG/1
1 PATCH, EXTENDED RELEASE TRANSDERMAL
Status: DISCONTINUED | OUTPATIENT
Start: 2024-09-19 | End: 2024-09-21 | Stop reason: HOSPADM

## 2024-09-19 RX ORDER — SODIUM CHLORIDE, SODIUM LACTATE, POTASSIUM CHLORIDE, CALCIUM CHLORIDE 600; 310; 30; 20 MG/100ML; MG/100ML; MG/100ML; MG/100ML
INJECTION, SOLUTION INTRAVENOUS CONTINUOUS
Status: ACTIVE | OUTPATIENT
Start: 2024-09-19 | End: 2024-09-19

## 2024-09-19 RX ORDER — LABETALOL HYDROCHLORIDE 5 MG/ML
5 INJECTION, SOLUTION INTRAVENOUS
Status: DISCONTINUED | OUTPATIENT
Start: 2024-09-19 | End: 2024-09-19 | Stop reason: HOSPADM

## 2024-09-19 RX ORDER — MIDAZOLAM HYDROCHLORIDE 1 MG/ML
INJECTION INTRAMUSCULAR; INTRAVENOUS PRN
Status: DISCONTINUED | OUTPATIENT
Start: 2024-09-19 | End: 2024-09-19 | Stop reason: SURG

## 2024-09-19 RX ORDER — MAGNESIUM SULFATE HEPTAHYDRATE 40 MG/ML
INJECTION, SOLUTION INTRAVENOUS PRN
Status: DISCONTINUED | OUTPATIENT
Start: 2024-09-19 | End: 2024-09-19 | Stop reason: SURG

## 2024-09-19 RX ORDER — DIPHENHYDRAMINE HCL 25 MG
25 TABLET ORAL EVERY 6 HOURS PRN
Status: DISCONTINUED | OUTPATIENT
Start: 2024-09-19 | End: 2024-09-21 | Stop reason: HOSPADM

## 2024-09-19 RX ORDER — MEPERIDINE HYDROCHLORIDE 25 MG/ML
12.5 INJECTION INTRAMUSCULAR; INTRAVENOUS; SUBCUTANEOUS
Status: DISCONTINUED | OUTPATIENT
Start: 2024-09-19 | End: 2024-09-19 | Stop reason: HOSPADM

## 2024-09-19 RX ORDER — DIPHENHYDRAMINE HYDROCHLORIDE 50 MG/ML
25 INJECTION INTRAMUSCULAR; INTRAVENOUS EVERY 6 HOURS PRN
Status: DISCONTINUED | OUTPATIENT
Start: 2024-09-19 | End: 2024-09-21 | Stop reason: HOSPADM

## 2024-09-19 RX ORDER — DEXAMETHASONE SODIUM PHOSPHATE 4 MG/ML
INJECTION, SOLUTION INTRA-ARTICULAR; INTRALESIONAL; INTRAMUSCULAR; INTRAVENOUS; SOFT TISSUE PRN
Status: DISCONTINUED | OUTPATIENT
Start: 2024-09-19 | End: 2024-09-19 | Stop reason: SURG

## 2024-09-19 RX ORDER — OXYCODONE HCL 5 MG/5 ML
5 SOLUTION, ORAL ORAL
Status: COMPLETED | OUTPATIENT
Start: 2024-09-19 | End: 2024-09-19

## 2024-09-19 RX ORDER — CALCIUM CARBONATE 500 MG/1
500 TABLET, CHEWABLE ORAL
Status: DISCONTINUED | OUTPATIENT
Start: 2024-09-19 | End: 2024-09-21 | Stop reason: HOSPADM

## 2024-09-19 RX ORDER — DEXTROSE MONOHYDRATE AND SODIUM CHLORIDE 5; .45 G/100ML; G/100ML
INJECTION, SOLUTION INTRAVENOUS CONTINUOUS
Status: DISCONTINUED | OUTPATIENT
Start: 2024-09-19 | End: 2024-09-20

## 2024-09-19 RX ORDER — HYDROMORPHONE HYDROCHLORIDE 1 MG/ML
0.2 INJECTION, SOLUTION INTRAMUSCULAR; INTRAVENOUS; SUBCUTANEOUS
Status: DISCONTINUED | OUTPATIENT
Start: 2024-09-19 | End: 2024-09-19 | Stop reason: HOSPADM

## 2024-09-19 RX ORDER — TRAZODONE HYDROCHLORIDE 50 MG/1
50 TABLET, FILM COATED ORAL NIGHTLY PRN
Status: DISCONTINUED | OUTPATIENT
Start: 2024-09-19 | End: 2024-09-21 | Stop reason: HOSPADM

## 2024-09-19 RX ORDER — ACETAMINOPHEN 500 MG
1000 TABLET ORAL EVERY 6 HOURS
Status: DISCONTINUED | OUTPATIENT
Start: 2024-09-19 | End: 2024-09-21 | Stop reason: HOSPADM

## 2024-09-19 RX ORDER — LIDOCAINE HYDROCHLORIDE 20 MG/ML
INJECTION, SOLUTION EPIDURAL; INFILTRATION; INTRACAUDAL; PERINEURAL PRN
Status: DISCONTINUED | OUTPATIENT
Start: 2024-09-19 | End: 2024-09-19 | Stop reason: SURG

## 2024-09-19 RX ORDER — OXYCODONE HYDROCHLORIDE 5 MG/1
5 TABLET ORAL
Status: DISCONTINUED | OUTPATIENT
Start: 2024-09-19 | End: 2024-09-21 | Stop reason: HOSPADM

## 2024-09-19 RX ORDER — HEPARIN SODIUM 5000 [USP'U]/ML
5000 INJECTION, SOLUTION INTRAVENOUS; SUBCUTANEOUS EVERY 8 HOURS
Status: DISCONTINUED | OUTPATIENT
Start: 2024-09-20 | End: 2024-09-20

## 2024-09-19 RX ORDER — HYDROMORPHONE HYDROCHLORIDE 1 MG/ML
0.4 INJECTION, SOLUTION INTRAMUSCULAR; INTRAVENOUS; SUBCUTANEOUS
Status: DISCONTINUED | OUTPATIENT
Start: 2024-09-19 | End: 2024-09-19 | Stop reason: HOSPADM

## 2024-09-19 RX ORDER — ONDANSETRON 2 MG/ML
4 INJECTION INTRAMUSCULAR; INTRAVENOUS
Status: DISCONTINUED | OUTPATIENT
Start: 2024-09-19 | End: 2024-09-19 | Stop reason: HOSPADM

## 2024-09-19 RX ADMIN — SCOPOLAMINE 1 PATCH: 1.5 PATCH, EXTENDED RELEASE TRANSDERMAL at 11:15

## 2024-09-19 RX ADMIN — CEFOTETAN DISODIUM 2 G: 2 INJECTION, POWDER, FOR SOLUTION INTRAMUSCULAR; INTRAVENOUS at 13:44

## 2024-09-19 RX ADMIN — EPHEDRINE SULFATE 10 MG: 50 INJECTION, SOLUTION INTRAVENOUS at 14:23

## 2024-09-19 RX ADMIN — PROPOFOL 30 MG: 10 INJECTION, EMULSION INTRAVENOUS at 16:35

## 2024-09-19 RX ADMIN — GABAPENTIN 300 MG: 300 CAPSULE ORAL at 11:15

## 2024-09-19 RX ADMIN — BUPIVACAINE HYDROCHLORIDE 20 ML: 2.5 INJECTION, SOLUTION EPIDURAL; INFILTRATION; INTRACAUDAL at 13:39

## 2024-09-19 RX ADMIN — FENTANYL CITRATE 50 MCG: 50 INJECTION, SOLUTION INTRAMUSCULAR; INTRAVENOUS at 17:38

## 2024-09-19 RX ADMIN — DEXTROSE AND SODIUM CHLORIDE: 5; 450 INJECTION, SOLUTION INTRAVENOUS at 19:34

## 2024-09-19 RX ADMIN — SODIUM CHLORIDE, POTASSIUM CHLORIDE, SODIUM LACTATE AND CALCIUM CHLORIDE: 600; 310; 30; 20 INJECTION, SOLUTION INTRAVENOUS at 11:16

## 2024-09-19 RX ADMIN — FENTANYL CITRATE 100 MCG: 50 INJECTION, SOLUTION INTRAMUSCULAR; INTRAVENOUS at 13:11

## 2024-09-19 RX ADMIN — LIDOCAINE HYDROCHLORIDE 20 MG: 20 INJECTION, SOLUTION EPIDURAL; INFILTRATION; INTRACAUDAL at 13:13

## 2024-09-19 RX ADMIN — ACETAMINOPHEN 1000 MG: 500 TABLET ORAL at 11:15

## 2024-09-19 RX ADMIN — ROCURONIUM BROMIDE 20 MG: 10 INJECTION, SOLUTION INTRAVENOUS at 13:44

## 2024-09-19 RX ADMIN — ONDANSETRON 4 MG: 2 INJECTION INTRAMUSCULAR; INTRAVENOUS at 16:08

## 2024-09-19 RX ADMIN — BUPIVACAINE HYDROCHLORIDE 1335 ML: 2.5 INJECTION, SOLUTION EPIDURAL; INFILTRATION; INTRACAUDAL at 13:33

## 2024-09-19 RX ADMIN — FENTANYL CITRATE 50 MCG: 50 INJECTION, SOLUTION INTRAMUSCULAR; INTRAVENOUS at 17:30

## 2024-09-19 RX ADMIN — HYDROMORPHONE HYDROCHLORIDE 0.2 MG: 2 INJECTION INTRAMUSCULAR; INTRAVENOUS; SUBCUTANEOUS at 14:53

## 2024-09-19 RX ADMIN — ROCURONIUM BROMIDE 20 MG: 10 INJECTION, SOLUTION INTRAVENOUS at 14:19

## 2024-09-19 RX ADMIN — HYDROMORPHONE HYDROCHLORIDE 0.4 MG: 2 INJECTION INTRAMUSCULAR; INTRAVENOUS; SUBCUTANEOUS at 14:21

## 2024-09-19 RX ADMIN — OXYCODONE HYDROCHLORIDE 5 MG: 5 TABLET ORAL at 20:17

## 2024-09-19 RX ADMIN — MAGNESIUM SULFATE HEPTAHYDRATE 4 G: 4 INJECTION, SOLUTION INTRAVENOUS at 13:51

## 2024-09-19 RX ADMIN — PROPOFOL 30 MG: 10 INJECTION, EMULSION INTRAVENOUS at 16:17

## 2024-09-19 RX ADMIN — OXYCODONE HYDROCHLORIDE 10 MG: 5 SOLUTION ORAL at 17:05

## 2024-09-19 RX ADMIN — DEXAMETHASONE SODIUM PHOSPHATE 4 MG: 4 INJECTION INTRA-ARTICULAR; INTRALESIONAL; INTRAMUSCULAR; INTRAVENOUS; SOFT TISSUE at 13:13

## 2024-09-19 RX ADMIN — HYDROMORPHONE HYDROCHLORIDE 0.4 MG: 2 INJECTION INTRAMUSCULAR; INTRAVENOUS; SUBCUTANEOUS at 14:05

## 2024-09-19 RX ADMIN — PROPOFOL 200 MG: 10 INJECTION, EMULSION INTRAVENOUS at 13:13

## 2024-09-19 RX ADMIN — ROCURONIUM BROMIDE 20 MG: 10 INJECTION, SOLUTION INTRAVENOUS at 15:11

## 2024-09-19 RX ADMIN — ACETAMINOPHEN 1000 MG: 500 TABLET ORAL at 19:30

## 2024-09-19 RX ADMIN — SUGAMMADEX 200 MG: 100 INJECTION, SOLUTION INTRAVENOUS at 16:32

## 2024-09-19 RX ADMIN — ACETAMINOPHEN 1000 MG: 500 TABLET ORAL at 23:36

## 2024-09-19 RX ADMIN — ROCURONIUM BROMIDE 60 MG: 10 INJECTION, SOLUTION INTRAVENOUS at 13:13

## 2024-09-19 RX ADMIN — MIDAZOLAM HYDROCHLORIDE 2 MG: 2 INJECTION, SOLUTION INTRAMUSCULAR; INTRAVENOUS at 13:09

## 2024-09-19 RX ADMIN — HYDROMORPHONE HYDROCHLORIDE 0.4 MG: 2 INJECTION INTRAMUSCULAR; INTRAVENOUS; SUBCUTANEOUS at 14:36

## 2024-09-19 RX ADMIN — LABETALOL HYDROCHLORIDE 5 MG: 5 INJECTION, SOLUTION INTRAVENOUS at 17:34

## 2024-09-19 SDOH — ECONOMIC STABILITY: TRANSPORTATION INSECURITY
IN THE PAST 12 MONTHS, HAS LACK OF RELIABLE TRANSPORTATION KEPT YOU FROM MEDICAL APPOINTMENTS, MEETINGS, WORK OR FROM GETTING THINGS NEEDED FOR DAILY LIVING?: NO

## 2024-09-19 SDOH — ECONOMIC STABILITY: TRANSPORTATION INSECURITY
IN THE PAST 12 MONTHS, HAS THE LACK OF TRANSPORTATION KEPT YOU FROM MEDICAL APPOINTMENTS OR FROM GETTING MEDICATIONS?: NO

## 2024-09-19 ASSESSMENT — SOCIAL DETERMINANTS OF HEALTH (SDOH)
WITHIN THE PAST 12 MONTHS, THE FOOD YOU BOUGHT JUST DIDN'T LAST AND YOU DIDN'T HAVE MONEY TO GET MORE: NEVER TRUE
WITHIN THE LAST YEAR, HAVE YOU BEEN HUMILIATED OR EMOTIONALLY ABUSED IN OTHER WAYS BY YOUR PARTNER OR EX-PARTNER?: NO
WITHIN THE LAST YEAR, HAVE TO BEEN RAPED OR FORCED TO HAVE ANY KIND OF SEXUAL ACTIVITY BY YOUR PARTNER OR EX-PARTNER?: NO
WITHIN THE PAST 12 MONTHS, YOU WORRIED THAT YOUR FOOD WOULD RUN OUT BEFORE YOU GOT THE MONEY TO BUY MORE: NEVER TRUE
IN THE PAST 12 MONTHS, HAS THE ELECTRIC, GAS, OIL, OR WATER COMPANY THREATENED TO SHUT OFF SERVICE IN YOUR HOME?: NO
WITHIN THE LAST YEAR, HAVE YOU BEEN AFRAID OF YOUR PARTNER OR EX-PARTNER?: NO
WITHIN THE LAST YEAR, HAVE YOU BEEN KICKED, HIT, SLAPPED, OR OTHERWISE PHYSICALLY HURT BY YOUR PARTNER OR EX-PARTNER?: NO

## 2024-09-19 ASSESSMENT — PATIENT HEALTH QUESTIONNAIRE - PHQ9
SUM OF ALL RESPONSES TO PHQ9 QUESTIONS 1 AND 2: 0
1. LITTLE INTEREST OR PLEASURE IN DOING THINGS: NOT AT ALL
2. FEELING DOWN, DEPRESSED, IRRITABLE, OR HOPELESS: NOT AT ALL

## 2024-09-19 ASSESSMENT — LIFESTYLE VARIABLES
TOTAL SCORE: 0
CONSUMPTION TOTAL: NEGATIVE
TOTAL SCORE: 0
HOW MANY TIMES IN THE PAST YEAR HAVE YOU HAD 5 OR MORE DRINKS IN A DAY: 0
TOTAL SCORE: 0
HAVE YOU EVER FELT YOU SHOULD CUT DOWN ON YOUR DRINKING: NO
EVER HAD A DRINK FIRST THING IN THE MORNING TO STEADY YOUR NERVES TO GET RID OF A HANGOVER: NO
EVER FELT BAD OR GUILTY ABOUT YOUR DRINKING: NO
ON A TYPICAL DAY WHEN YOU DRINK ALCOHOL HOW MANY DRINKS DO YOU HAVE: 1
AVERAGE NUMBER OF DAYS PER WEEK YOU HAVE A DRINK CONTAINING ALCOHOL: 0
ALCOHOL_USE: NO
DOES PATIENT WANT TO STOP DRINKING: NO
HAVE PEOPLE ANNOYED YOU BY CRITICIZING YOUR DRINKING: NO

## 2024-09-19 ASSESSMENT — PAIN DESCRIPTION - PAIN TYPE
TYPE: SURGICAL PAIN
TYPE: ACUTE PAIN
TYPE: SURGICAL PAIN
TYPE: SURGICAL PAIN
TYPE: ACUTE PAIN
TYPE: SURGICAL PAIN

## 2024-09-19 ASSESSMENT — FIBROSIS 4 INDEX: FIB4 SCORE: 1.33

## 2024-09-19 NOTE — ANESTHESIA TIME REPORT
Anesthesia Start and Stop Event Times       Date Time Event    9/19/2024 1257 Ready for Procedure     1305 Anesthesia Start     1654 Anesthesia Stop          Responsible Staff  09/19/24      Name Role Begin End    Sergio Zhang M.D. Anesth 1305 1656          Overtime Reason:  no overtime (within assigned shift)    Comments:

## 2024-09-19 NOTE — ANESTHESIA PROCEDURE NOTES
Peripheral Block    Date/Time: 9/19/2024 1:28 PM    Performed by: Sergio Zhang M.D.  Authorized by: Sergio Zhang M.D.    Patient Location:  OR  Start Time:  9/19/2024 1:28 PM  End Time:  9/19/2024 1:39 PM  Reason for Block: at surgeon's request and post-op pain management ONLY    patient identified, IV checked, site marked, risks and benefits discussed, surgical consent, monitors and equipment checked, pre-op evaluation and timeout performed    Patient Position:  Supine  Prep: ChloraPrep    Monitoring:  Heart rate, continuous pulse ox and cardiac monitor  Block Region:  Trunk  Trunk - Block Type:  Abdominal plane block - TAP block    Laterality:  Bilateral  Procedures: ultrasound guided  Image captured, interpreted and electronically stored.  Strength:  1 %  Dose:  3 ml  Block Type:  Single-shot  Needle Length:  100mm  Needle Gauge:  21 G  Needle Localization:  Ultrasound guidance  Ultrasound picture in chart  Injection Assessment:  Negative aspiration for heme, no paresthesia on injection, incremental injection and local visualized surrounding nerve on ultrasound  Evidence of intravascular injection: No

## 2024-09-19 NOTE — ANESTHESIA PROCEDURE NOTES
Airway    Date/Time: 9/19/2024 1:16 PM    Performed by: Sergio Zhang M.D.  Authorized by: Sergio Zhang M.D.    Location:  OR  Urgency:  Elective  Indications for Airway Management:  Anesthesia      Spontaneous Ventilation: absent    Sedation Level:  Deep  Preoxygenated: Yes    Patient Position:  Sniffing  Mask Difficulty Assessment:  2 - vent by mask + OA or adjuvant +/- NMBA  Final Airway Type:  Endotracheal airway  Final Endotracheal Airway:  ETT  Cuffed: Yes    Technique Used for Successful ETT Placement:  Direct laryngoscopy    Insertion Site:  Oral  Blade Type:  Glide  Laryngoscope Blade/Videolaryngoscope Blade Size:  4  ETT Size (mm):  7.0  Measured from:  Teeth  ETT to Teeth (cm):  22  Placement Verified by: auscultation and capnometry    Cormack-Lehane Classification:  Grade I - full view of glottis  Number of Attempts at Approach:  1

## 2024-09-19 NOTE — ANESTHESIA PREPROCEDURE EVALUATION
Case: [id number] Date/Time: 09/19/24 1145    Procedures:       ROBOTIC TYSON'S REVERSAL WITH FLEXIBLE SIGMOIDOSCOPY      SIGMOIDOSCOPY, FLEXIBLE    Pre-op diagnosis: COLOSTOMY PRESENT    Location: [place] [address] / SURGERY [facility]    Surgeons: Joselyn Wang M.D.            Relevant Problems   ENDO   (positive) Hypothyroid       Physical Exam    Airway   Mallampati: III  TM distance: >3 FB  Neck ROM: full       Cardiovascular - normal exam  Rhythm: regular  Rate: normal  (-) murmur     Dental - normal exam           Pulmonary - normal exam  Breath sounds clear to auscultation     Abdominal    Neurological - normal exam         Other findings: Good nck ROM. No loose teeth          Patient nreports no history of awaken intubation after radical next dissection. Most recent airway grtade 32 view with DL, one attempt.. aCTIVITY >4 METS. Pt agrees to TAP block    Anesthesia Plan    ASA 2       Plan - general and peripheral nerve block     Peripheral nerve block will be post-op pain control  Airway plan will be ETT          Induction: intravenous    Postoperative Plan: Postoperative administration of opioids is intended.    Pertinent diagnostic labs and testing reviewed    Informed Consent:    Anesthetic plan and risks discussed with patient.    Use of blood products discussed with: patient whom consented to blood products.

## 2024-09-19 NOTE — OP REPORT
Operative Report     Date of Procedure:  9/19/2024     PreOp Diagnosis:   Colostomy     PostOp Diagnosis:   Same     Procedure(s):  Robotic Molina's reversal  Robotic lysis of adhesions  Flexible sigmoidoscopy    Surgeon:  Joselyn Wang M.D.     Assistant:  Francie Mclaughlin PA-C  Essentials and assistant throughout the entire case due to its complexity.  Required for managing robotic instruments, assisting through 2 laparoscopic assistant ports, takedown of colostomy, creation of the anastomosis and closure the abdomen.    Anesthesiologist/Type of Anesthesia:  Anesthesiologist: Sergio Zhang M.D./General     Specimen:  1.  Colostomy  2.  Proximal rectal stump  3.  Anastomotic donuts     Estimated Blood Loss:  75 cc    Wound class:  Contaminated     Findings:   Redundant normal-appearing descending and proximal sigmoid colon.  Significant scarring surrounding the rectal stump with multiple loops of small bowel adhered to the pelvis right lower quadrant and left lower quadrant requiring extensive lysis of adhesions for greater than 1 hour until normal anatomy was restored.  Rectal stump significantly folded on itself and adhesed down to the anterior peritoneal reflection.  Anastomosis just above anterior peritoneal reflection.     Complications:  None    Counts:  Correct     Indications:  48-year-old male who underwent open Molina's procedure for perforated diverticulitis.  Here for elective colostomy reversal.  For    Operative Procedure:  The patient was brought to the operating suite and placed on cardiopulmonary monitors.  General endotracheal anesthesia was induced.  Bilateral tap blocks were performed by anesthesia.  Both arms were padded and tucked at the sides.  Bilateral calf SCDs were placed.  The legs were placed in the lithotomy position in the yellowfin stirrups.  A safety chest strap was fashioned with a towel and taped to the bed.  A Adhikari catheter was placed under sterile technique.  Any hair was  clipped and removed.  A NOLBERTO was perfored without palpable abnormality. The rectum was irrigated with diluted Betadine through a proctoscope.  Colostomy was closed in a pursestring fashion.  The abdomen and perineum was prepped and draped in usual sterile fashion.  The preoperative safety checklist was performed.  The patient was administered 2 g of cefotetan.    Circumferential incision was made around the colostomy and deepened into the subcutaneous fat.  A combination of sharp and blunt dissection using the Metzenbaum scissors electrocautery and finger dissection was performed circumferentially to free up the colon from the surrounding abdominal wall.  This was done until it was completely free below the abdominal wall.  A defect was made in the mesentery adjacent to the colostomy and an 0 Vicryl tie with a Melvi clamp was used to ligate the mesentery.  Towels were placed over the port sites.  The automatic pursestring applier was applied.  The curved Ricci scissors were used to transect the bowel.  The specimen was sent to pathology as sigmoid colon and proximal rectum.  4 Allises were used to grasp the mucosa and serosa.  The anvil was dipped in iodine and was inserted.  The suture was slowly tightened while making sure that the mucosa was everted with each of the Allises.  The Allises were removed.  The suture was tied.  It was then wrapped around a second time and was tied in place.  There was no other surrounding mesentery or epiploica that had to be removed.  Betadine was applied.  This was reinserted into the abdominal cavity.  Gloves were changed.      A 5 mm laparoscopic Optiview trocar was then used to enter the abdomen in the left upper quadrant.  The abdomen was insufflated to 15 mmHg.  There was no evidence of injury from trocar insertion.  Planned trocar sites were marked out.  There were adhesions of the omentum up to the anterior abdominal wall down the midline.  Some of the trocars were initially  able to be placed but others were not until further lysis of adhesions was performed.  This was performed with the hot scissors laparoscopically until all of the omentum was down.  Once all the ports were able to be placed there was a total of three 8 mm robotic trocars and a 12 mm robotic trocar that was in the right lower quadrant.  Two 5 mm assistant ports.  They were all placed under direct visualization without injury.  Patient was placed in Trendelenburg and left side up.  Robot was docked and robotic instruments were inserted under direct visualization.    The lateral adhesions to the sigmoid colon were taken down with the hot scissors along the white line of Toldt.  This was  away from the lateral sidewall.  There appeared to be good redundancy to easily flopped down towards the pelvis.  There were multiple small bowel adhesions within the pelvis and lysis of adhesions was performed for total of greater than 1 hour in the surgery with the cold scissors to remove all the adhesions from the pelvis.  At this time the normal anatomy was of stored and the rectal stump could not be visualized initially.  EEA sizers were introduced to identify it being located centrally but had been scarred down circumferentially and the staple line could not be identified.  Further lysis of adhesions were performed with the cold scissors and the anterior peritoneal reflection was able to be identified and the rectal stump was able to be dissected away from the surrounding adhesions.  The EEA sizers easily reached up to the top of the rectal stump.  Further dissection demonstrated the old staple line.  The surrounding fatty tissue was cleared off with the vessel sealer.  The 60 mm robotic stapler with a green cartridge was used to divide the proximal rectal stump to have a fresh staple line.  The tissues were noted to be quite thick.  A total of 3 firings were required with good apposition of the staple line.  The proximal  rectum was placed within an Endo Catch bag for removal later on in the case.       A 29 EEA stapler was advanced through the anal canal and rectum.  The spike was advanced in the middle just anterior to the staple line.  The anvil and the spike were mated.  There was no additional tissue and the stapler was closed.  The stapler was fired and removed without issue.  The pelvis was filled with water and the flexible sigmoidoscope was inserted and the anastomosis was intact and appeared well perfused.  No evidence of bleeding.  There was no evidence of leak on the airleak test.  The irrigation was suctioned. Both anastomotic donuts were intact.  The specimen was removed from the left lower quadrant previous colostomy site and sent to pathology.    A 19 Portuguese round Segundo drain was placed in the pelvis anterior to the anastomosis and sutured in place with a 2-0 nylon.  An 0 Vicryl on a suture passer was used to close the 12 mm port site in the right lower quadrant.  The pneumoperitoneum was evacuated all the ports and the Corby were removed.  The equipment was also removed except for the electrocautery.    Gowns and gloves were changed and a closing tray was used.  The colostomy site was then closed in 2 layers with a #1 PDS on the proximal rectus sheath as well as on the anterior rectus sheath..  The subcutaneous tissue was reapproximated with 3-0 Vicryl's.  Quarter inch Penrose drain was placed centrally.  The skin was closed with a pursestring 3-0 Monocryl.  Each port site was closed with 3-0 Monocryl and Dermabond.    The patient tolerated the procedure well.  They were stable and extubated and brought to the recovery room.  They will be recovered with a ERAS pathway.

## 2024-09-19 NOTE — DISCHARGE INSTRUCTIONS
Robotic/Laparoscopic Surgery Discharge Instructions:    1. DIET: Upon discharge from the hospital you may resume your normal preoperative diet (if you had a colectomy or surgery on your bowels, then continue soft/low fiber diet). Depending on how you are feeling and whether you have nausea or not, you may wish to stay with a bland diet for the first few days. However, you can advance your diet as quickly as you feel ready.    2. ACTIVITIES: You have a ten pound weight lifting restriction for six weeks after surgery.  This means no lifting anything heavier than a gallon of milk.  Routine activities such as walking and using the stairs are safe.  You may sleep in any position that is comfortable. Avoid strenuous activities and exercise that involves twisting, bending, and, running.    3. DRIVING: You may drive whenever you are off pain medications and are able to perform the activities needed to drive, i.e. turning, bending, twisting, wearing a seat belt, etc.    4. BATHING: You may shower the day following your surgery, but do not submerge in a bath or a pool until you after your first postoperative visit.    5. BOWEL FUNCTION: You may develop either frequent or loose stools after meals. This usually corrects itself after a few days, to a few weeks.  You may try a probiotic like Align.    You may develop constipation. The combination of pain medication and decreased activity level can cause constipation in otherwise normal patients. If you feel this is occurring, increase your fluid intake and take an over the counter laxative (Milk of Magnesia, Miralax, or Magnesium Citrate).    6. PAIN MEDICATION: A prescription for pain medication may have been sent to your pharmacy. Please take these as directed. It is important to remember not to take medications on an empty stomach as this may cause nausea.  Wean the use of pain medication as soon as possible.  If narcotics were prescribed, try to avoid their use and try  non-narcotic medications, such as tylenol first.    7. CALL IF YOU HAVE: (1) Fevers of more than 101 F (38.5 C), (2) New chest or leg pain, (3) Worsening abdominal pain, (4) Persistent vomiting, (5) Large quantity of bleeding, (6) Drainage from incision that is foul smelling or redness or swelling at the incision site.    8. FOLLOW-UP APPOINTMENT: Contact my office at [phone] for a follow-up appointment in 1 to 2 weeks following your procedure.    If you have any additional questions, please do not hesitate to call the office.  Calls will be returned during business hours.      If you feel that you are having an emergency, please present directly to your closest emergency department.    Office address:  [address] [place] [address], [address] [place] [address]      Joselyn Wang M.D.   [facility]  General Surgery  Colon and Rectal Surgeon

## 2024-09-19 NOTE — PROGRESS NOTES
Medication history reviewed with PT at bedside    Med rec is complete per PT reporting    Allergies reviewed.     Patient denies any outpatient antibiotics in the last 30 days.     Patient is not taking anticoagulants.    Preferred pharmacy for this visit - [facility] ([phone])

## 2024-09-20 LAB
ANION GAP SERPL CALC-SCNC: 12 MMOL/L (ref 7–16)
BUN SERPL-MCNC: 18 MG/DL (ref 8–22)
CALCIUM SERPL-MCNC: 8.9 MG/DL (ref 8.5–10.5)
CHLORIDE SERPL-SCNC: 103 MMOL/L (ref 96–112)
CO2 SERPL-SCNC: 24 MMOL/L (ref 20–33)
CREAT SERPL-MCNC: 1.36 MG/DL (ref 0.5–1.4)
ERYTHROCYTE [DISTWIDTH] IN BLOOD BY AUTOMATED COUNT: 44 FL (ref 35.9–50)
GFR SERPLBLD CREATININE-BSD FMLA CKD-EPI: 64 ML/MIN/1.73 M 2
GLUCOSE SERPL-MCNC: 131 MG/DL (ref 65–99)
HCT VFR BLD AUTO: 44.1 % (ref 42–52)
HGB BLD-MCNC: 15.4 G/DL (ref 14–18)
MCH RBC QN AUTO: 31.2 PG (ref 27–33)
MCHC RBC AUTO-ENTMCNC: 34.9 G/DL (ref 32.3–36.5)
MCV RBC AUTO: 89.5 FL (ref 81.4–97.8)
PATHOLOGY CONSULT NOTE: NORMAL
PLATELET # BLD AUTO: 260 K/UL (ref 164–446)
PMV BLD AUTO: 10.2 FL (ref 9–12.9)
POTASSIUM SERPL-SCNC: 4.5 MMOL/L (ref 3.6–5.5)
RBC # BLD AUTO: 4.93 M/UL (ref 4.7–6.1)
SODIUM SERPL-SCNC: 139 MMOL/L (ref 135–145)
WBC # BLD AUTO: 10.8 K/UL (ref 4.8–10.8)

## 2024-09-20 PROCEDURE — 302043 TAPE, HYPAFIX: Performed by: SURGERY

## 2024-09-20 PROCEDURE — A9270 NON-COVERED ITEM OR SERVICE: HCPCS

## 2024-09-20 PROCEDURE — 85027 COMPLETE CBC AUTOMATED: CPT

## 2024-09-20 PROCEDURE — 700102 HCHG RX REV CODE 250 W/ 637 OVERRIDE(OP)

## 2024-09-20 PROCEDURE — 80048 BASIC METABOLIC PNL TOTAL CA: CPT

## 2024-09-20 PROCEDURE — 770001 HCHG ROOM/CARE - MED/SURG/GYN PRIV*

## 2024-09-20 PROCEDURE — A9270 NON-COVERED ITEM OR SERVICE: HCPCS | Performed by: SURGERY

## 2024-09-20 PROCEDURE — 700111 HCHG RX REV CODE 636 W/ 250 OVERRIDE (IP): Mod: JZ | Performed by: SURGERY

## 2024-09-20 PROCEDURE — 700102 HCHG RX REV CODE 250 W/ 637 OVERRIDE(OP): Performed by: SURGERY

## 2024-09-20 RX ORDER — CELECOXIB 200 MG/1
200 CAPSULE ORAL 2 TIMES DAILY
Status: DISCONTINUED | OUTPATIENT
Start: 2024-09-20 | End: 2024-09-21 | Stop reason: HOSPADM

## 2024-09-20 RX ORDER — ENOXAPARIN SODIUM 100 MG/ML
40 INJECTION SUBCUTANEOUS DAILY
Status: DISCONTINUED | OUTPATIENT
Start: 2024-09-20 | End: 2024-09-21 | Stop reason: HOSPADM

## 2024-09-20 RX ORDER — GABAPENTIN 300 MG/1
300 CAPSULE ORAL 3 TIMES DAILY
Status: DISCONTINUED | OUTPATIENT
Start: 2024-09-20 | End: 2024-09-21 | Stop reason: HOSPADM

## 2024-09-20 RX ADMIN — CELECOXIB 200 MG: 200 CAPSULE ORAL at 09:06

## 2024-09-20 RX ADMIN — LEVOTHYROXINE SODIUM 150 MCG: 0.15 TABLET ORAL at 05:09

## 2024-09-20 RX ADMIN — ACETAMINOPHEN 1000 MG: 500 TABLET ORAL at 19:09

## 2024-09-20 RX ADMIN — ACETAMINOPHEN 1000 MG: 500 TABLET ORAL at 12:34

## 2024-09-20 RX ADMIN — GABAPENTIN 300 MG: 300 CAPSULE ORAL at 09:06

## 2024-09-20 RX ADMIN — ACETAMINOPHEN 1000 MG: 500 TABLET ORAL at 05:09

## 2024-09-20 RX ADMIN — ENOXAPARIN SODIUM 40 MG: 100 INJECTION SUBCUTANEOUS at 19:09

## 2024-09-20 ASSESSMENT — PAIN DESCRIPTION - PAIN TYPE
TYPE: ACUTE PAIN

## 2024-09-20 NOTE — OR NURSING
Report to Kathrin MCGEE. Plan of care discussed. Patient reports tolerable aching in abdomen. No complaints of nausea. ERAS 10L oxymask in place. Patient tolerating sips of apple juice in PACU without difficulty. Surgical sites x5 CDI with ice pack in place. Drain and bhandari emptied. Patient updated on plans to transfer to hospital room. Per patient, no one to update at this time.

## 2024-09-20 NOTE — CARE PLAN
Problem: Pain - Standard  Goal: Alleviation of pain or a reduction in pain to the patient’s comfort goal  Outcome: Progressing     Problem: Fall Risk  Goal: Patient will remain free from falls  Outcome: Progressing     Problem: Knowledge Deficit - Standard  Goal: Patient and family/care givers will demonstrate understanding of plan of care, disease process/condition, diagnostic tests and medications  Outcome: Progressing   The patient is Stable - Low risk of patient condition declining or worsening    Shift Goals  Clinical Goals: pain and nausea management, IV fluids, drain management  Patient Goals: rest    Progress made toward(s) clinical / shift goals:  Patient's pain controlled with PO medications overnight. No complaints of nausea. Bloody output to RAIMUNDO drain overnight.    Patient is not progressing towards the following goals:

## 2024-09-20 NOTE — PROGRESS NOTES
Surgical Progress Note:    POD1   Robotic Molina's reversal  Robotic lysis of adhesions    S:  - Feeling well  - Tolerating diet, no n/v  - No gas/ BM yet  - Pain well managed  - Ambulating  - No chest pain, extremity pain, or difficulty breathing    Vitals:  /77   Pulse 80   Temp 36.5 °C (97.7 °F) (Temporal)   Resp 18   Ht 1.829 m (6')   Wt 98.7 kg (217 lb 9.5 oz)   SpO2 96%   BMI 29.51 kg/m²     I/O:   Intake/Output Summary (Last 24 hours) at 9/20/2024 0655  Last data filed at 9/20/2024 0335  Gross per 24 hour   Intake 1200 ml   Output 1170 ml   Net 30 ml       P/E:  Constitutional: Appears well, no distress  Head/Neck: Normocephalic, atraumatic, neck supple  Cardiovascular: Normal rate and rhythm  Pulmonary/Chest: Normal respiratory effort, no wheezes  Abdominal: Soft, appropriately tender, no distension, incision(s) clean/dry/intact, drain serosang, penrose in place  Musculoskeletal: No deficits  Neurological:  Alert, oriented  Skin:  Warm and dry, no erythema  Extremities: No edema, no evidence of DVT    Labs:  Recent Labs     09/20/24  0423   WBC 10.8   RBC 4.93   HEMOGLOBIN 15.4   HEMATOCRIT 44.1   MCV 89.5   MCH 31.2   RDW 44.0   PLATELETCT 260   MPV 10.2     Recent Labs     09/19/24  1123 09/20/24  0423   SODIUM 140 139   POTASSIUM 4.1 4.5   CHLORIDE 103 103   CO2 24 24   GLUCOSE 95 131*   BUN 20 18         A/P:   - Cr normal, DC maintenance fluids  - Advance diet as tolerated to soft/low residue  - Incentive spirometry q1h while awake  - Ambulate at least QID, up in chair for all meals  - Multimodal analgesia, try to avoid narcotics  - Lovenox/SCDs  - Segundo likely out before DC home, Penrose to be removed in office in 1 week  - Plan for discharge home once bowel function returns in 1 to 2 days      Joselyn Wang M.D.  [facility]  [phone]

## 2024-09-20 NOTE — PROGRESS NOTES
4 Eyes Skin Assessment Completed by EVERARDO Pinon and EVERARDO Soto.    Head WDL  Ears WDL  Nose WDL  Mouth WDL  Neck WDL  Breast/Chest WDL  Shoulder Blades Redness and Blanching  Spine Redness and Blanching  (R) Arm/Elbow/Hand WDL  (L) Arm/Elbow/Hand WDL  Abdomen x5 lap sites with dermabond HARMONY, RLQ RAIMUNDO drain with DIP, LLQ penrose drain with DIP.   Groin Adhikari catheter in place CDI.   Scrotum/Coccyx/Buttocks Redness and Blanching  (R) Leg WDL  (L) Leg WDL  (R) Heel/Foot/Toe dry  (L) Heel/Foot/Toe dry          Devices In Places Blood Pressure Cuff, Pulse Ox, Adhikari, SCD's, and Oxy Mask      Interventions In Place Pillows    Possible Skin Injury No    Pictures Uploaded Into Epic N/A  Wound Consult Placed N/A  RN Wound Prevention Protocol Ordered No

## 2024-09-20 NOTE — ANESTHESIA POSTPROCEDURE EVALUATION
Patient: Stuart Nagy    Procedure Summary       Date: 09/19/24 Room / Location: [place] [address] / SURGERY [facility]    Anesthesia Start: 1305 Anesthesia Stop: 1654    Procedures:       ROBOTIC HARTMANS REVERSAL (Abdomen)      SIGMOIDOSCOPY, FLEXIBLE (Anus)      LYSIS, ADHESIONS, LAPAROSCOPIC (Abdomen) Diagnosis: (COLOSTOMY PRESENT)    Surgeons: Joselyn Wang M.D. Responsible Provider: Sergio Zhang M.D.    Anesthesia Type: general, peripheral nerve block ASA Status: 2            Final Anesthesia Type: general, peripheral nerve block  Last vitals  BP   Blood Pressure: (!) 148/90    Temp   36.4 °C (97.6 °F)    Pulse   74   Resp   15    SpO2   99 %      Anesthesia Post Evaluation    Patient location during evaluation: PACU  Patient participation: complete - patient participated  Level of consciousness: awake and alert    Airway patency: patent  Anesthetic complications: no  Cardiovascular status: hemodynamically stable  Respiratory status: acceptable  Hydration status: euvolemic    PONV: none          No notable events documented.     Nurse Pain Score: 4 (NPRS)

## 2024-09-20 NOTE — PROGRESS NOTES
Patient arrived to [facility] T 415 with transport. Call light within reach, bed in lowest/locked position.

## 2024-09-20 NOTE — PROGRESS NOTES
Bedside report received.  Assessment complete.  A&O x 4. Patient calls appropriately.  Patient ambulates with stand by assist. Low fall risk.  Patient has 5/10 pain. Patient medicated per MAR.  Denies N&V. Tolerating regular diet.  RAIMUNDO drain with bloody output. Penrose drain to LLQ with dressing in place. X5 lap sites w/Dermabond C/D/I  + void via bhandari, - flatus, - BM.  Patient denies SOB and chest pain.  SCD's on.  Patient IS 3000.  Review plan with of care with patient. Call light and personal belongings within reach. Hourly rounding in place. All needs met at this time.

## 2024-09-21 ENCOUNTER — PHARMACY VISIT (OUTPATIENT)
Dept: PHARMACY | Facility: MEDICAL CENTER | Age: 49
End: 2024-09-21
Payer: COMMERCIAL

## 2024-09-21 VITALS
SYSTOLIC BLOOD PRESSURE: 103 MMHG | RESPIRATION RATE: 18 BRPM | TEMPERATURE: 98.6 F | BODY MASS INDEX: 29.47 KG/M2 | HEART RATE: 71 BPM | WEIGHT: 217.59 LBS | HEIGHT: 72 IN | OXYGEN SATURATION: 96 % | DIASTOLIC BLOOD PRESSURE: 61 MMHG

## 2024-09-21 LAB
ANION GAP SERPL CALC-SCNC: 12 MMOL/L (ref 7–16)
BUN SERPL-MCNC: 17 MG/DL (ref 8–22)
CALCIUM SERPL-MCNC: 8.8 MG/DL (ref 8.5–10.5)
CHLORIDE SERPL-SCNC: 107 MMOL/L (ref 96–112)
CO2 SERPL-SCNC: 20 MMOL/L (ref 20–33)
CREAT SERPL-MCNC: 1.13 MG/DL (ref 0.5–1.4)
ERYTHROCYTE [DISTWIDTH] IN BLOOD BY AUTOMATED COUNT: 43.9 FL (ref 35.9–50)
GFR SERPLBLD CREATININE-BSD FMLA CKD-EPI: 80 ML/MIN/1.73 M 2
GLUCOSE SERPL-MCNC: 92 MG/DL (ref 65–99)
HCT VFR BLD AUTO: 42.1 % (ref 42–52)
HGB BLD-MCNC: 14.2 G/DL (ref 14–18)
MCH RBC QN AUTO: 30.3 PG (ref 27–33)
MCHC RBC AUTO-ENTMCNC: 33.7 G/DL (ref 32.3–36.5)
MCV RBC AUTO: 89.8 FL (ref 81.4–97.8)
PLATELET # BLD AUTO: 225 K/UL (ref 164–446)
PMV BLD AUTO: 9.9 FL (ref 9–12.9)
POTASSIUM SERPL-SCNC: 4.3 MMOL/L (ref 3.6–5.5)
RBC # BLD AUTO: 4.69 M/UL (ref 4.7–6.1)
SODIUM SERPL-SCNC: 139 MMOL/L (ref 135–145)
WBC # BLD AUTO: 9.6 K/UL (ref 4.8–10.8)

## 2024-09-21 PROCEDURE — RXMED WILLOW AMBULATORY MEDICATION CHARGE: Performed by: SURGERY

## 2024-09-21 PROCEDURE — A9270 NON-COVERED ITEM OR SERVICE: HCPCS

## 2024-09-21 PROCEDURE — 85027 COMPLETE CBC AUTOMATED: CPT

## 2024-09-21 PROCEDURE — A9270 NON-COVERED ITEM OR SERVICE: HCPCS | Performed by: SURGERY

## 2024-09-21 PROCEDURE — 700102 HCHG RX REV CODE 250 W/ 637 OVERRIDE(OP)

## 2024-09-21 PROCEDURE — 700102 HCHG RX REV CODE 250 W/ 637 OVERRIDE(OP): Performed by: SURGERY

## 2024-09-21 PROCEDURE — 80048 BASIC METABOLIC PNL TOTAL CA: CPT

## 2024-09-21 RX ORDER — GABAPENTIN 300 MG/1
300 CAPSULE ORAL 3 TIMES DAILY
Qty: 15 CAPSULE | Refills: 0 | Status: SHIPPED | OUTPATIENT
Start: 2024-09-21 | End: 2024-09-26

## 2024-09-21 RX ORDER — CELECOXIB 200 MG/1
200 CAPSULE ORAL 2 TIMES DAILY
Qty: 8 CAPSULE | Refills: 0 | Status: SHIPPED | OUTPATIENT
Start: 2024-09-21 | End: 2024-09-25

## 2024-09-21 RX ORDER — OXYCODONE HYDROCHLORIDE 5 MG/1
5 TABLET ORAL EVERY 6 HOURS PRN
Qty: 12 TABLET | Refills: 0 | Status: SHIPPED | OUTPATIENT
Start: 2024-09-21 | End: 2024-09-24

## 2024-09-21 RX ADMIN — LEVOTHYROXINE SODIUM 150 MCG: 0.15 TABLET ORAL at 06:08

## 2024-09-21 RX ADMIN — CELECOXIB 200 MG: 200 CAPSULE ORAL at 06:08

## 2024-09-21 RX ADMIN — ACETAMINOPHEN 1000 MG: 500 TABLET ORAL at 06:08

## 2024-09-21 RX ADMIN — ACETAMINOPHEN 1000 MG: 500 TABLET ORAL at 00:22

## 2024-09-21 RX ADMIN — GABAPENTIN 300 MG: 300 CAPSULE ORAL at 06:07

## 2024-09-21 ASSESSMENT — PAIN DESCRIPTION - PAIN TYPE
TYPE: ACUTE PAIN

## 2024-09-21 ASSESSMENT — COGNITIVE AND FUNCTIONAL STATUS - GENERAL
SUGGESTED CMS G CODE MODIFIER DAILY ACTIVITY: CH
SUGGESTED CMS G CODE MODIFIER MOBILITY: CH
MOBILITY SCORE: 24
DAILY ACTIVITIY SCORE: 24

## 2024-09-21 NOTE — PROGRESS NOTES
Bedside report received from Maday MCGEE. Assumed care of patient at 1845.  Assessment complete.    Patient A&O x 4. Patient calling appropriately.  Mobility: Patient ambulates with no assist.   Fall Risk Assessment: No fall risk per Canales Cruz score. Bed alarm n/a.   Pain: Patient reports pain to abdomen, pain controlled with scheduled medications. Patient educated on pain rating scale, PRN medications for pain management, and nonpharmacologic measures for pain control.   Diet: Tolerating regular diet. Denies nausea/vomiting.   LDA:    Access: 18RAC, dressing CDI, saline locked   Drains: RAIMUNDO and penrose to abdomen, dressings in place CDI   Surgical incisions: lap sites  Joaquin Score: Minimal risk for skin breakdown. Interventions: n/a  GI/:   + void, adequate clear/yellow UO  + flatus  Last BM +9/20 x3 per pt  DVT Prophylaxis: Lovenox. SCD's n/a, educated on purpose.   Respiratory: Patient SaO2 >90% on RA, denies SOB. IS at bedside.     Reviewed plan of care with patient. Call light and personal belongings within reach. Hourly rounding in place. All needs met at this time.

## 2024-09-21 NOTE — PROGRESS NOTES
Report received from RN, assumed care at 0645  Pt is A0X4, and responds appropriately   Pt declines any SOB, chest pain, new onset of numbness/ tingling  Declines pain  Pt is voiding adequately and without hesitancy  Pt has + flatus, + bowel sounds, + BM on 9/20 x2  Pt ambulates independently   Pt is tolerating a diet, pt denies any nausea/vomiting  RAIMUNDO drain with serosang output  Plan of care discussed, all questions answered. Explained importance of calling before getting OOB and pt verbalizes understanding. Explained importance of oral care. Call light is within reach, treaded slipper socks on, bed in lowest/ locked position, hourly rounding in place, all needs met at this time

## 2024-09-21 NOTE — PROGRESS NOTES
Discharge lounge orders placed. Discharge lounge RN to provide discharge paperwork, education, meds-to-beds, and remove PIV. All belongings collected, no questions or concerns at this time.

## 2024-09-21 NOTE — DISCHARGE SUMMARY
Discharge Summary    CHIEF COMPLAINT ON ADMISSION  No chief complaint on file.      Reason for Admission  Colostomy status     Admission Date  9/19/2024    CODE STATUS  Full Code    HPI & HOSPITAL COURSE  This is a 48 y.o. male here with end colostomy after previous Molina's procedure for perforated diverticulitis.  Patient did well following surgery.  Tolerating diet.  Bowels functioning well.  Ambulating well.  Minimal pain.  No postoperative concerns.  No notes on file    Therefore, he is discharged in good and stable condition to home with close outpatient follow-up.    The patient met 2-midnight criteria for an inpatient stay at the time of discharge.    Discharge Date  9/21/2024    FOLLOW UP ITEMS POST DISCHARGE  Follow-up in office in 1 week to remove Penrose    DISCHARGE DIAGNOSES  Active Problems:    * No active hospital problems. *  Resolved Problems:    * No resolved hospital problems. *      FOLLOW UP  No future appointments.  Joselyn Wang M.D.  [address] [facility] [address]  [facility] [address]  [phone]    Follow up        MEDICATIONS ON DISCHARGE     Medication List        START taking these medications        Instructions   celecoxib 200 MG Caps  Commonly known as: CeleBREX   Take 1 Capsule by mouth 2 times a day for 4 days.  Dose: 200 mg     gabapentin 300 MG Caps  Commonly known as: Neurontin   Take 1 Capsule by mouth 3 times a day for 5 days.  Dose: 300 mg     oxyCODONE immediate-release 5 MG Tabs  Commonly known as: Roxicodone   Take 1 Tablet by mouth every 6 hours as needed for Severe Pain for up to 3 days.  Dose: 5 mg            CONTINUE taking these medications        Instructions   levothyroxine 150 MCG Tabs  Commonly known as: Synthroid   Take 150 mcg by mouth every day.  Dose: 150 mcg     multivitamin Tabs   Take 1 Tablet by mouth every day.   Dose: 1 Tablet            STOP taking these medications      Dulcolax 5 MG EC tablet  Generic drug: bisacodyl     polyethylene glycol 3350 17  GM/SCOOP Powd  Commonly known as: Miralax              Allergies  No Known Allergies    DIET  Orders Placed This Encounter   Procedures    Diet Order Diet: Regular     Standing Status:   Standing     Number of Occurrences:   1     Order Specific Question:   ERAS     Answer:   Yes     Order Specific Question:   Diet:     Answer:   Regular [1]       ACTIVITY  Light duty.  10-lb lifting restriction    CONSULTATIONS  None    PROCEDURES  See operative report    LABORATORY  Lab Results   Component Value Date    SODIUM 139 09/21/2024    POTASSIUM 4.3 09/21/2024    CHLORIDE 107 09/21/2024    CO2 20 09/21/2024    GLUCOSE 92 09/21/2024    BUN 17 09/21/2024    CREATININE 1.13 09/21/2024        Lab Results   Component Value Date    WBC 9.6 09/21/2024    HEMOGLOBIN 14.2 09/21/2024    HEMATOCRIT 42.1 09/21/2024    PLATELETCT 225 09/21/2024        Total time of the discharge process exceeds 30 minutes.

## 2024-09-21 NOTE — CARE PLAN
The patient is Stable - Low risk of patient condition declining or worsening    Shift Goals  Clinical Goals: bowel movement, tolerate diet  Patient Goals: rest    Progress made toward(s) clinical / shift goals:  patient had x2 bowel movements and has no nausea/vomiting with diet. Eager to dc tomorrow after breakfast.     Patient is not progressing towards the following goals:

## 2024-09-21 NOTE — PROGRESS NOTES
Pt given discharge instructions.  Discussed diet, activity, follow up appointments, symptoms and drain management, and prescriptions provided.  Packet sent with patient and all questions answered. PIV removed and hemostasis achieved. Meds to beds being filled., patient agreed to wait in lobby until meds delivered.

## 2024-09-21 NOTE — CARE PLAN
The patient is Stable - Low risk of patient condition declining or worsening    Shift Goals  Clinical Goals: BM  Patient Goals: rest, d/c in AM    Progress made toward(s) clinical / shift goals:  Patient's pain well controlled with scheduled multimodals. Patient did not require additional pain medications throughout the night, able to sleep and perform ADLs independently. Patient having Bms, tolerating regular diet, and voiding spontaneously.     Problem: Knowledge Deficit - Standard  Goal: Patient and family/care givers will demonstrate understanding of plan of care, disease process/condition, diagnostic tests and medications  Description: Target End Date:  1-3 days or as soon as patient condition allows    Document in Patient Education    1.  Patient and family/caregiver oriented to unit, equipment, visitation policy and means for communicating concern  2.  Complete/review Learning Assessment  3.  Assess knowledge level of disease process/condition, treatment plan, diagnostic tests and medications  4.  Explain disease process/condition, treatment plan, diagnostic tests and medications  Outcome: Progressing       Patient is not progressing towards the following goals:

## 2024-10-07 ENCOUNTER — OFFICE VISIT (OUTPATIENT)
Dept: URGENT CARE | Facility: PHYSICIAN GROUP | Age: 49
End: 2024-10-07
Payer: COMMERCIAL

## 2024-10-07 VITALS
HEART RATE: 77 BPM | HEIGHT: 72 IN | SYSTOLIC BLOOD PRESSURE: 122 MMHG | TEMPERATURE: 98.6 F | RESPIRATION RATE: 16 BRPM | OXYGEN SATURATION: 98 % | WEIGHT: 220 LBS | BODY MASS INDEX: 29.8 KG/M2 | DIASTOLIC BLOOD PRESSURE: 72 MMHG

## 2024-10-07 DIAGNOSIS — R53.83 FATIGUE, UNSPECIFIED TYPE: ICD-10-CM

## 2024-10-07 PROCEDURE — 3074F SYST BP LT 130 MM HG: CPT | Performed by: PHYSICIAN ASSISTANT

## 2024-10-07 PROCEDURE — 99203 OFFICE O/P NEW LOW 30 MIN: CPT | Performed by: PHYSICIAN ASSISTANT

## 2024-10-07 PROCEDURE — 3078F DIAST BP <80 MM HG: CPT | Performed by: PHYSICIAN ASSISTANT

## 2024-10-07 ASSESSMENT — ENCOUNTER SYMPTOMS
SORE THROAT: 0
FEVER: 0
NAUSEA: 0
HEADACHES: 0
CONSTIPATION: 0
CHILLS: 0
COUGH: 0
MYALGIAS: 0
BLOOD IN STOOL: 0
VOMITING: 0
DIZZINESS: 0
ABDOMINAL PAIN: 0
DIARRHEA: 0

## 2024-10-07 ASSESSMENT — FIBROSIS 4 INDEX: FIB4 SCORE: 1.026400478559334693

## 2025-04-04 ENCOUNTER — DOCUMENTATION (OUTPATIENT)
Dept: WOUND CARE | Facility: MEDICAL CENTER | Age: 50
End: 2025-04-04
Payer: COMMERCIAL

## (undated) DEVICE — TROCAR 5X100 BLADED Z-THREAD - KII (6/BX)

## (undated) DEVICE — SUTURE 1 VICRYL PLUS CTX - 36 INCH (36/BX)

## (undated) DEVICE — GOWN WARMING STANDARD FLEX - (30/CA)

## (undated) DEVICE — PLUMEPEN ULTRA 3/8 IN X 10 FT HOSE (20EA/CA)

## (undated) DEVICE — STAPLER CIRCULAR POWERED 29MM ECHELON (3EA/BX)

## (undated) DEVICE — KIT  I.V. START (100EA/CA)

## (undated) DEVICE — SWAB CULTURE AMIES ESWAB (50EA/PK)

## (undated) DEVICE — SUCTION INSTRUMENT YANKAUER OPEN TIP W/O VENT (50EA/CA)

## (undated) DEVICE — BLADE SURGICAL #10 - (50/BX)

## (undated) DEVICE — SENSOR OXIMETER ADULT SPO2 RD SET (20EA/BX)

## (undated) DEVICE — SET EXTENSION WITH 2 PORTS (48EA/CA) ***PART #2C8610 IS A SUBSTITUTE*****

## (undated) DEVICE — PACK TRENGUARD 450 PROCEDURE (12EA/CA)

## (undated) DEVICE — PURSE STRING 65 - (3EA/CA)

## (undated) DEVICE — SYSTEM CLEARIFY VISUALIZATION (10EA/PK)

## (undated) DEVICE — STAPLER SUREFORM 60 12 MM (6EA/BX)

## (undated) DEVICE — COVER LIGHT HANDLE ALC PLUS DISP (18EA/BX)

## (undated) DEVICE — TOWELS CLOTH SURGICAL - (4/PK 20PK/CA)

## (undated) DEVICE — SUTURE 0 COATED VICRYL 6-18IN - (12PK/BX)

## (undated) DEVICE — SUTURE 4-0 VICRYL PLUS FS-2 - 27 INCH (36/BX)

## (undated) DEVICE — ELECTRODE DUAL RETURN W/ CORD - (50/PK)

## (undated) DEVICE — SUCTION INSTRUMENT YANKAUER BULBOUS TIP W/O VENT (50EA/CA)

## (undated) DEVICE — CANISTER SUCTION 3000ML MECHANICAL FILTER AUTO SHUTOFF MEDI-VAC NONSTERILE LF DISP (40EA/CA)

## (undated) DEVICE — KIT ENDOSCOPIC LIGHT SIGMOIDOSCOPE WITH OBTURATOR SUCTION INSTRUMENT AND TUBING SET 8FR (10EA/CA)

## (undated) DEVICE — OBTURATOR BLADELESS STANDARD 8MM (6EA/BX)

## (undated) DEVICE — SUTURE 1 PDS PLUS CT-1 36IN (36EA/BX)

## (undated) DEVICE — SLEEVE, VASO, THIGH, MED

## (undated) DEVICE — RESERVOIR SUCTION 100 CC - SILICONE (20EA/CA)

## (undated) DEVICE — SET SUCTION/IRRIGATION WITH DISPOSABLE TIP (6/CA )PART #0250-070-520 IS A SUB

## (undated) DEVICE — Device

## (undated) DEVICE — SODIUM CHL IRRIGATION 0.9% 1000ML (12EA/CA)

## (undated) DEVICE — CANNULA W/SEAL 5X100 Z-THRE - ADED KII (12/BX)

## (undated) DEVICE — BUTTON ENDOSCOPY DISPOSABLE

## (undated) DEVICE — SUTURE 3-0 SILK FS 18 INCH - (12PK/BX)

## (undated) DEVICE — BIPOLAR FORCE DA VINCI 12X'S REISABLE

## (undated) DEVICE — PAD LAP STERILE 18 X 18 - (5/PK 40PK/CA)

## (undated) DEVICE — COVER TIP ENDOWRIST HOT SHEAR - (10EA/BX) DA VINCI

## (undated) DEVICE — SCISSORS 5MM CVD (6EA/BX)

## (undated) DEVICE — BAG RETRIEVAL 5MM (10EA/BX)

## (undated) DEVICE — SUTURE 3-0 VICRYL PLUS SH - 8X 18 INCH (12/BX)

## (undated) DEVICE — GLOVE BIOGEL SZ 7.5 SURGICAL PF LTX - (50PR/BX 4BX/CA)

## (undated) DEVICE — DRESSING KIT V.A.C. SENSA T.R.A.C. LARGE (10EA/CA)

## (undated) DEVICE — GRAFT MESH SEPRAFILM PRO PACK - 5/BX CONTAINS 6 3X5 PIECES

## (undated) DEVICE — SUTURE 3-0 PDS II PLUS SH 27 IN (36EA/BX)

## (undated) DEVICE — GLOVE BIOGEL PI INDICATOR SZ 6.5 SURGICAL PF LF - (50/BX 4BX/CA)

## (undated) DEVICE — PORT AUXILLARY WATER (50EA/BX)

## (undated) DEVICE — TUBING CLEARLINK DUO-VENT - C-FLO (48EA/CA)

## (undated) DEVICE — TUBE NG SALEM SUMP 16FR (50EA/CA)

## (undated) DEVICE — TUBE CONNECT SUCTION CLEAR 120 X 1/4" (50EA/CA)"

## (undated) DEVICE — CANISTER SUCTION RIGID RED 1500CC (40EA/CA)

## (undated) DEVICE — CHLORAPREP 26 ML APPLICATOR - ORANGE TINT(25/CA)

## (undated) DEVICE — GLOVE SZ 6.5 BIOGEL PI MICRO - PF LF (50PR/BX)

## (undated) DEVICE — DRAPE COLUMN BOX OF 20

## (undated) DEVICE — LACTATED RINGERS INJ 1000 ML - (14EA/CA 60CA/PF)

## (undated) DEVICE — GLOVE BIOGEL INDICATOR SZ 7.5 SURGICAL PF LTX - (50PR/BX 4BX/CA)

## (undated) DEVICE — BOVIE  BLADE 6 EXTENDED - (50/PK)

## (undated) DEVICE — SUTURE MONOCRYL PLUS UD 27IN(70CM) USP3-0(M2) S/A PS-2 PRM MP (36PK/BX)

## (undated) DEVICE — GLOVE BIOGEL SZ 7 SURGICAL PF LTX - (50PR/BX 4BX/CA)

## (undated) DEVICE — STAPLE 40MM GREEN 4.8MM (6EA/BX)

## (undated) DEVICE — PAD OR TABLE DA VINCI 2IN X 20IN X 72IN - (12EA/CA)

## (undated) DEVICE — STAPLER CONTOUR CURVED GREEN 4.8MM W/STAPLE (3EA/BX)

## (undated) DEVICE — DRAPE ARM BOX OF 20

## (undated) DEVICE — SHEARS MONOPOLAR CURVED DA VINCI 10X'S REUSABLE

## (undated) DEVICE — GLOVE SZ 7 BIOGEL PI MICRO - PF LF (50PR/BX 4BX/CA)

## (undated) DEVICE — SET LEADWIRE 5 LEAD BEDSIDE DISPOSABLE ECG (1SET OF 5/EA)

## (undated) DEVICE — SUTURE 2-0 ETHILON FS - (36/BX) 18 INCH

## (undated) DEVICE — PACK LAP CHOLE OR - (2EA/CA)

## (undated) DEVICE — CANISTER SUCTION 3000ML MECHANICAL FILTER AUTO SHUTOFF MEDI-VAC NONSTERILE LF DISP  (40EA/CA)

## (undated) DEVICE — NEEDLE INSFL 120MM 14GA VRRS - (20/BX)

## (undated) DEVICE — TROCAR 5X100 NON BLADED Z-TH - READ KII (6/BX)

## (undated) DEVICE — TRAY CATHETER FOLEY URINE METER W/STATLOCK 350ML (10EA/CA)

## (undated) DEVICE — WATER IRRIGATION STERILE 1000ML (12EA/CA)

## (undated) DEVICE — PACK DAVINCI LOW ANTERIOR RESECTION (1EA/CA)

## (undated) DEVICE — DRAIN PENROSE STERILE 1/4 X - 18 IN (25EA/BX)

## (undated) DEVICE — SUTURE GENERAL

## (undated) DEVICE — SEALER VESSEL EXTEND FROM DAVINCI ENERGY (6EA/BX)

## (undated) DEVICE — PEN SKIN MARKER W/RULER - (50EA/BX)

## (undated) DEVICE — VAC CANISTER W/GEL 500ML - FITS NEW MACHINES (10EA/CA)

## (undated) DEVICE — MASK PANORAMIC OXYGEN PRO2 (30EA/CA)

## (undated) DEVICE — GOWN SURGEONS X-LARGE - DISP. (30/CA)

## (undated) DEVICE — KIT CUSTOM PROCEDURE SINGLE FOR ENDO (15/CA)

## (undated) DEVICE — STAPLER SKIN DISP - (6/BX 10BX/CA) VISISTAT

## (undated) DEVICE — ELECTRODE 850 FOAM ADHESIVE - HYDROGEL RADIOTRNSPRNT (50/PK)

## (undated) DEVICE — NEPTUNE 4 PORT MANIFOLD - (20/PK)

## (undated) DEVICE — GLOVE BIOGEL INDICATOR SZ 6.5 SURGICAL PF LTX - (50PR/BX 4BX/CA)

## (undated) DEVICE — TUBE CONNECTING SUCTION - CLEAR PLASTIC STERILE 72 IN (50EA/CA)

## (undated) DEVICE — GLOVE BIOGEL PI INDICATOR SZ 7.0 SURGICAL PF LF - (50/BX 4BX/CA)

## (undated) DEVICE — GRASPER TIP UP FENESTRATED XI 10X'S REUSABLE (10UN/EA)

## (undated) DEVICE — RELOAD WITH GRIPPING SURFACE TECHNOLOGY BLUE 60MM (12EA/BX)

## (undated) DEVICE — RELOAD STAPLER SUREFORM 60 GREEN (12EA/BX)

## (undated) DEVICE — RELOAD STAPLER SUREFORM 60 WHITE (12EA/BX)

## (undated) DEVICE — GLOVE BIOGEL PI INDICATOR SZ 7.5 SURGICAL PF LF -(50/BX 4BX/CA)

## (undated) DEVICE — TROCAR Z THREAD 11 X 100 - BLADED (6/BX)